# Patient Record
Sex: FEMALE | Race: WHITE | NOT HISPANIC OR LATINO | Employment: OTHER | URBAN - METROPOLITAN AREA
[De-identification: names, ages, dates, MRNs, and addresses within clinical notes are randomized per-mention and may not be internally consistent; named-entity substitution may affect disease eponyms.]

---

## 2017-01-31 RX ORDER — CALCIUM CITRATE/VITAMIN D3 200MG-6.25
TABLET ORAL
Qty: 50 STRIP | Refills: 11 | Status: SHIPPED | OUTPATIENT
Start: 2017-01-31 | End: 2019-01-17

## 2017-01-31 NOTE — TELEPHONE ENCOUNTER
Was the patient seen in the last year in this department? Yes     Does patient have an active prescription for medications requested? No     Received Request Via: Pharmacy    Future Appointments       Provider Department [place]    2/16/2017 10:10 AM JOSS Morrison [facility]

## 2017-02-07 DIAGNOSIS — E78.49 OTHER HYPERLIPIDEMIA: ICD-10-CM

## 2017-02-07 RX ORDER — SIMVASTATIN 40 MG
40 TABLET ORAL EVERY EVENING
Qty: 30 TAB | Refills: 5 | Status: SHIPPED | OUTPATIENT
Start: 2017-02-07 | End: 2017-08-22

## 2017-02-07 NOTE — TELEPHONE ENCOUNTER
Was the patient seen in the last year in this department? Yes     Does patient have an active prescription for medications requested? No     Received Request Via: Pharmacy   Future Appointments       Provider Department [place]    2/16/2017 10:10 AM JOSS Morrison [facility]

## 2017-02-14 ENCOUNTER — HOSPITAL ENCOUNTER (OUTPATIENT)
Dept: LAB | Facility: MEDICAL CENTER | Age: 57
End: 2017-02-14
Attending: NURSE PRACTITIONER
Payer: MEDICAID

## 2017-02-14 DIAGNOSIS — R45.86 MOOD CHANGES: ICD-10-CM

## 2017-02-14 DIAGNOSIS — E11.9 TYPE 2 DIABETES MELLITUS WITHOUT COMPLICATION, WITHOUT LONG-TERM CURRENT USE OF INSULIN (HCC): ICD-10-CM

## 2017-02-14 DIAGNOSIS — E78.49 OTHER HYPERLIPIDEMIA: ICD-10-CM

## 2017-02-14 LAB
ALBUMIN SERPL BCP-MCNC: 4.2 G/DL (ref 3.2–4.9)
ALBUMIN/GLOB SERPL: 1.4 G/DL
ALP SERPL-CCNC: 92 U/L (ref 30–99)
ALT SERPL-CCNC: 17 U/L (ref 2–50)
ANION GAP SERPL CALC-SCNC: 7 MMOL/L (ref 0–11.9)
AST SERPL-CCNC: 17 U/L (ref 12–45)
BILIRUB SERPL-MCNC: 0.4 MG/DL (ref 0.1–1.5)
BUN SERPL-MCNC: 13 MG/DL (ref 8–22)
CALCIUM SERPL-MCNC: 9.2 MG/DL (ref 8.5–10.5)
CHLORIDE SERPL-SCNC: 103 MMOL/L (ref 96–112)
CHOLEST SERPL-MCNC: 194 MG/DL (ref 100–199)
CO2 SERPL-SCNC: 33 MMOL/L (ref 20–33)
CREAT SERPL-MCNC: 0.62 MG/DL (ref 0.5–1.4)
CREAT UR-MCNC: 194.6 MG/DL
EST. AVERAGE GLUCOSE BLD GHB EST-MCNC: 134 MG/DL
GLOBULIN SER CALC-MCNC: 3 G/DL (ref 1.9–3.5)
GLUCOSE SERPL-MCNC: 138 MG/DL (ref 65–99)
HBA1C MFR BLD: 6.3 % (ref 0–5.6)
HDLC SERPL-MCNC: 65 MG/DL
LDLC SERPL CALC-MCNC: 106 MG/DL
MICROALBUMIN UR-MCNC: 1.5 MG/DL
MICROALBUMIN/CREAT UR: 8 MG/G (ref 0–30)
POTASSIUM SERPL-SCNC: 3.7 MMOL/L (ref 3.6–5.5)
PROT SERPL-MCNC: 7.2 G/DL (ref 6–8.2)
SODIUM SERPL-SCNC: 143 MMOL/L (ref 135–145)
TRIGL SERPL-MCNC: 113 MG/DL (ref 0–149)
TSH SERPL DL<=0.005 MIU/L-ACNC: 2.16 UIU/ML (ref 0.3–3.7)

## 2017-02-14 PROCEDURE — 80053 COMPREHEN METABOLIC PANEL: CPT

## 2017-02-14 PROCEDURE — 83036 HEMOGLOBIN GLYCOSYLATED A1C: CPT

## 2017-02-14 PROCEDURE — 84443 ASSAY THYROID STIM HORMONE: CPT

## 2017-02-14 PROCEDURE — 82570 ASSAY OF URINE CREATININE: CPT

## 2017-02-14 PROCEDURE — 82043 UR ALBUMIN QUANTITATIVE: CPT

## 2017-02-14 PROCEDURE — 80061 LIPID PANEL: CPT

## 2017-02-14 PROCEDURE — 36415 COLL VENOUS BLD VENIPUNCTURE: CPT

## 2017-02-16 ENCOUNTER — OFFICE VISIT (OUTPATIENT)
Dept: MEDICAL GROUP | Facility: MEDICAL CENTER | Age: 57
End: 2017-02-16
Attending: NURSE PRACTITIONER
Payer: MEDICAID

## 2017-02-16 VITALS
SYSTOLIC BLOOD PRESSURE: 138 MMHG | BODY MASS INDEX: 48.33 KG/M2 | TEMPERATURE: 97.6 F | HEIGHT: 61 IN | WEIGHT: 256 LBS | DIASTOLIC BLOOD PRESSURE: 90 MMHG | HEART RATE: 80 BPM | OXYGEN SATURATION: 94 % | RESPIRATION RATE: 16 BRPM

## 2017-02-16 DIAGNOSIS — J45.901 ASTHMA EXACERBATION: ICD-10-CM

## 2017-02-16 DIAGNOSIS — E78.49 OTHER HYPERLIPIDEMIA: ICD-10-CM

## 2017-02-16 DIAGNOSIS — I10 ESSENTIAL HYPERTENSION: ICD-10-CM

## 2017-02-16 DIAGNOSIS — J45.30 MILD PERSISTENT ASTHMA, UNCOMPLICATED: ICD-10-CM

## 2017-02-16 DIAGNOSIS — J45.30 MILD PERSISTENT ASTHMA WITHOUT COMPLICATION: ICD-10-CM

## 2017-02-16 DIAGNOSIS — R45.86 MOOD CHANGES: ICD-10-CM

## 2017-02-16 DIAGNOSIS — E66.01 MORBID OBESITY WITH BMI OF 45.0-49.9, ADULT (HCC): ICD-10-CM

## 2017-02-16 DIAGNOSIS — E11.9 TYPE 2 DIABETES MELLITUS WITHOUT COMPLICATION, WITHOUT LONG-TERM CURRENT USE OF INSULIN (HCC): ICD-10-CM

## 2017-02-16 PROCEDURE — 99214 OFFICE O/P EST MOD 30 MIN: CPT | Performed by: NURSE PRACTITIONER

## 2017-02-16 PROCEDURE — 99213 OFFICE O/P EST LOW 20 MIN: CPT | Performed by: NURSE PRACTITIONER

## 2017-02-16 RX ORDER — ALBUTEROL SULFATE 2.5 MG/3ML
2.5 SOLUTION RESPIRATORY (INHALATION) EVERY 4 HOURS PRN
Qty: 75 ML | Refills: 3 | Status: SHIPPED | OUTPATIENT
Start: 2017-02-16 | End: 2017-12-19 | Stop reason: SDUPTHER

## 2017-02-16 RX ORDER — LANCETS
EACH MISCELLANEOUS
COMMUNITY
Start: 2016-12-13

## 2017-02-16 RX ORDER — GLIPIZIDE 5 MG/1
5 TABLET ORAL 2 TIMES DAILY
Qty: 60 TAB | Refills: 2 | Status: SHIPPED | OUTPATIENT
Start: 2017-02-16 | End: 2017-05-22 | Stop reason: SDUPTHER

## 2017-02-16 RX ORDER — VALSARTAN 160 MG/1
160 TABLET ORAL DAILY
Qty: 30 TAB | Refills: 3 | Status: SHIPPED | OUTPATIENT
Start: 2017-02-16 | End: 2017-03-30 | Stop reason: SDUPTHER

## 2017-02-16 RX ORDER — ALBUTEROL SULFATE 90 UG/1
2 AEROSOL, METERED RESPIRATORY (INHALATION) EVERY 6 HOURS PRN
Qty: 8.5 G | Refills: 5 | Status: SHIPPED | OUTPATIENT
Start: 2017-02-16 | End: 2017-10-04 | Stop reason: SDUPTHER

## 2017-02-16 NOTE — ASSESSMENT & PLAN NOTE
"Pt reports her mood is a little better and keeping busy.  Has a car to drive now.  Pt reports stress at home as lives with her daughter and her .  Pt did see Chelsea for intake at [facility] and will have f/u appt for stress reduction.  Rarely using Xanax.   Atarax is helping her sleep \"alot'    I have given Natalie info to contact Dr Jin-psychiatry for a consult.    "

## 2017-02-16 NOTE — PROGRESS NOTES
Chief Complaint: Results and f/u on anxiety/stress.    HPI:  Natalie presents to the clinic for follow up on anxiety.    Her PMH includes    Anxiety/Situtional Stress  Asthma  HTN  Hyperlipidemia  Obesity  Vitamin D Deficiency  DM-2  Right Knee DJD  Splenectomy  Tonsillectomy    Cholecystectomy  Chronic Hoarse Throat    Established with   Orthopedics- Dr Ospina ([facility])    Referrals Approved:  Psychiatry- DR Jin and assoc  [id number]      Review of Records show  16 Clinic visit for anxiety/stress due to family death. Referred to Psychiatry and Psychology(Counseling), RX Xanax and Atarax.  16 Bilat Knee injections by [facility] SAMANO, Dr Cowart  16 Bilat Knee Injections by [facility] SAM[facility]O, Dr Cowart for DJD to knees.  16 Clinic visit for elevated BP, Bleeding Mole concern, Referred to Dermatology ([place] Provider)  16 Last Clinic Visit for chronic hoarse throat, ACE Inhibitor stopped and Pt started on Valsartan, Referral to Allergist and ENT    [place]  Report  16 Xanax 0.25 # 20 prescribed by me  -----------------------------------------------------------    REsults Review:  17 Labs-  CMP normal except BS= 138, A1C= 6.3 (~ 134 avg) compared to previous A1C= 7.0 on 16                          Cholesterol Total= 114, Triglycerides = 113, HDL= 65, LDL= 106 (improved since April)                          Microalbumin/Cr urine Ratio= 8,  TSH= 2.160 compared to previous 3.030 on 16    Essential hypertension  BP  138/90  in clinic today. Known hx of HTN. Pt stating that they have been taking their medication except today, as prescribed without adverse effect. Pt denies HA, vision changes, neurologic deficits, CP, SOB.     Her BP has been elevated the past few visits. We discussed increasing her Valsartan dose and to continue her Amlodipine at current dose and Pt agrees.  Pt to check BP a few times per week. If too high or low to  "call/return.    Hyperlipidemia  We reviewed her  Much improved Lipid Panel.  Pt reports she is taking her Zocor 40 mg/day.  Denies Myalgias      Type 2 diabetes mellitus without complication  WE reviewed her improved A1C and other labs.  Pt reports she is taking Metformin only once in a while as it causes diarrhea. Denies lows.    Morbid obesity with BMI of 45.0-49.9, adult ([facility])  Pt is over weight and has been working on reducing her intake of sugar.   She has lost weight recently. 8/2016  Wt = 267, Today 256 lbs.  Pt is having difficulty exercising due to knee pains, but has an injections planned into her knees by   Dr Ospina's.     Mood changes (CMS-HCC)/Anxiety/Insomnia  Pt reports her mood is a little better and keeping busy.  Has a car to drive now.  Pt reports stress at home as lives with her daughter and her .  Pt did see Chelsea for intake at [facility] and will have f/u appt for stress reduction.  Rarely using Xanax.   Atarax is helping her sleep \"alot'    I have given Natalie info to contact Dr Jin-psychiatry for a consult.      Mild persistent asthma  Pt reports asthma is \"okay\" needs refills of inhaler.  Former smoker. Denies SOB or feeling ill.   Will refill her 2 inhalers (atrovent and Albuterol).  Pt has nebulizer at home but rarely needs it.      Patient Active Problem List    Diagnosis Date Noted   • Morbid obesity with BMI of 45.0-49.9, adult ([facility]) 02/16/2017   • Mood changes (CMS-HCC) 12/13/2016   • Atypical mole 08/01/2016   • Hoarseness, persistent 07/07/2016   • Insomnia 07/07/2016   • Hyperlipidemia 06/09/2016   • Osteoarthritis of knee 04/14/2016   • Vision problems 03/24/2016   • Right knee pain 03/24/2016   • Immunization deficiency 03/24/2016   • Vitamin D deficiency 11/12/2015   • Hyperglycemia 11/12/2015   • Type 2 diabetes mellitus without complication (CMS-HCC) 11/12/2015   • Essential hypertension 10/22/2015   • Mild persistent asthma 10/22/2015   • Episodic headache " 10/22/2015   • H/O splenectomy 10/22/2015       Allergies:Voltaren    Current Outpatient Prescriptions   Medication Sig Dispense Refill   • glipiZIDE (GLUCOTROL) 5 MG Tab Take 1 Tab by mouth 2 times a day. 60 Tab 2   • ipratropium (ATROVENT) 17 MCG/ACT Aero Soln Inhale 2 Puffs by mouth every 6 hours. 17 g 5   • albuterol 108 (90 BASE) MCG/ACT Aero Soln inhalation aerosol Inhale 2 Puffs by mouth every 6 hours as needed for Shortness of Breath. 8.5 g 5   • albuterol (PROVENTIL) 2.5mg/3ml Nebu Soln solution for nebulization 3 mL by Nebulization route every four hours as needed for Shortness of Breath. 75 mL 3   • valsartan (DIOVAN) 160 MG Tab Take 1 Tab by mouth every day. 30 Tab 3   • TECHLITE LANCETS Misc      • simvastatin (ZOCOR) 40 MG Tab Take 1 Tab by mouth every evening. 30 Tab 5   • TRUE METRIX BLOOD GLUCOSE TEST strip FINGER STICK BLOOD SUGAR ONCE DAILY FASTING AND IF SYMPTOMATIC 50 Strip 11   • LUMIGAN 0.01 % Solution      • amlodipine (NORVASC) 10 MG Tab Take 1 Tab by mouth every day. 30 Tab 5   • hydrOXYzine (ATARAX) 25 MG Tab Take 1 Tab by mouth at bedtime as needed for Anxiety (Insomnia). 30 Tab 3   • montelukast (SINGULAIR) 10 MG Tab Take 1 Tab by mouth every day. 30 Tab 5   • Misc. Devices Misc DM testing supplies: glucometer(1), test strips(50), lancets(50)Dispense brand covered by patients insuranceTesting frequency: Daily- fasting and if [id number] 1 Each 0   • fluticasone (FLONASE ALLERGY RELIEF) 50 MCG/ACT nasal spray Spray 1 Spray in nose 2 times a day. 1 Bottle 5   • alprazolam (XANAX) 0.25 MG Tab Take 1 Tab by mouth 1 time daily as needed for Anxiety. 20 Tab 0   • vitamin D, Ergocalciferol, (DRISDOL) 13463 UNITS Cap capsule TAKE 1 CAPSULE ORALLY ONCE EVERY SEVEN (7) DAYS 4 Cap 3   • carbamazepine (TEGRETOL) 200 MG Tab TAKE 1 TABLET ORALLY EVERY MORNING AND 2 TABLETS BY MOUTH EVERY EVENING 90 Tab 5   • fluticasone (FLONASE) 50 MCG/ACT nasal spray Spray 1 Spray in nose every day. 16 g 11  "  • meclizine (ANTIVERT) 25 MG Tab Take 1 Tab by mouth 2 times a day as needed. 30 Tab 1     No current facility-administered medications for this visit.       Social History   Substance Use Topics   • Smoking status: Former Smoker -- 5 years   • Smokeless tobacco: Never Used   • Alcohol Use: No       Family History   Problem Relation Age of Onset   • Diabetes Mother    • Heart Disease Mother        ROS:  Review of Systems   See HPI Above        Exam:  Blood pressure 138/90, pulse 80, temperature 36.4 °C (97.6 °F), resp. rate 16, height 1.549 m (5' 0.98\"), weight 116.121 kg (256 lb), SpO2 94 %.  General:  Well nourished, over-weight, well developed female in NAD  HENT:Head is grossly normal. PERRL.  Neck: Supple. Trachea is midline.  Pulmonary: Clear to ausculation .  Normal effort. No rales, ronchi, or wheezing.   Cardiovascular: Regular rate and rhythm.  Abdomen-Abdomen is soft, No tenderness.  Upper extremities- Strong = . Good ROM  Lower extremities- neg for edema, redness, tenderness.  Neuro- A & O x 4. Speech clear and appropriate.     Current medications, allergies, and problem list reviewed with patient and updated in  [facility] today.    Assessment/Plan:  1. Essential hypertension  valsartan (DIOVAN) 160 MG Tab- Increase in dose. Continue Amlodipine 10 mg/day   2. Other hyperlipidemia  Continue Simvastatin 40 mg/day.   3. Type 2 diabetes mellitus without complication, without long-term current use of insulin (CMS-HCC)  glipiZIDE (GLUCOTROL) 5 MG Tab BID.  Stop Metformin due to diarrhea and Pt not tolerating or taking. Pt to continue reduction of intake of sugar/carbs   4. Morbid obesity with BMI of 45.0-49.9, adult (CMS-HCC)  Patient identified as having weight management issue.  Appropriate orders and counseling given.   5. Mood changes (CMS-HCC)  Xanax on rare use only. Atarax at hs prn  Continue w [facility] counseling. Pt to call Dr Jin for Psychiatry appt.   6. Asthma exacerbation  ipratropium " (ATROVENT) 17 MCG/ACT Aero Soln-refill   7. Mild persistent asthma, uncomplicated  albuterol 108 (90 BASE) MCG/ACT Aero Soln inhalation aerosol-refill    albuterol (PROVENTIL) 2.5mg/3ml Nebu Soln solution for nebulization-refill        Follow up in 6 weeks.. Call or return if questions, concerns, or worsening condition.

## 2017-02-16 NOTE — ASSESSMENT & PLAN NOTE
Pt is over weight and has been working on reducing her intake of sugar.   She has lost weight recently. 8/2016  Wt = 267, Today 256 lbs.  Pt is having difficulty exercising due to knee pains, but has an injections planned into her knees by   Dr Ospina's.

## 2017-02-16 NOTE — MR AVS SNAPSHOT
"        Natalie Eugene   2017 11:10 AM   Office Visit   MRN: [id number]    Department:  [facility]   Dept Phone:  [phone]    Description:  Female : 1960   Provider:  JOSS Morrison           Reason for Visit     Follow-Up           Allergies as of 2017     Allergen Noted Reactions    Voltaren [Diclofenac-Disod Edta] 2016   Itching      You were diagnosed with     Essential hypertension   [1230976]       Other hyperlipidemia   [0875543]       Type 2 diabetes mellitus without complication, without long-term current use of insulin (CMS-HCC)   [9469183]       Morbid obesity with BMI of 45.0-49.9, adult (CMS-HCC)   [483420]       Mood changes (CMS-HCC)   [211871]       Asthma exacerbation   [901733]       Mild persistent asthma, uncomplicated   [890862]       Mild persistent asthma without complication   [212309]         Vital Signs     Blood Pressure Pulse Temperature Respirations Height Weight    138/90 mmHg 80 36.4 °C (97.6 °F) 16 1.549 m (5' 0.98\") 116.121 kg (256 lb)    Body Mass Index Oxygen Saturation Smoking Status             48.40 kg/m2 94% Former Smoker         Basic Information     Date Of Birth Sex Race Ethnicity Preferred Language    1960 Female White Non- English      Your appointments     Mar 30, 2017 10:30 AM   Established Patient with JOSS Morrison   [facility]    [address] [place] [id number]   [phone]           You will be receiving a confirmation call a few days before your appointment from our automated call confirmation system.              Problem List              ICD-10-CM Priority Class Noted - Resolved    Essential hypertension I10   10/22/2015 - Present    Mild persistent asthma J45.30   10/22/2015 - Present    Episodic headache R51   10/22/2015 - Present    H/O splenectomy Z90.81   10/22/2015 - Present    Vitamin D deficiency E55.9   2015 - Present    Hyperglycemia R73.9   2015 " - Present    Type 2 diabetes mellitus without complication (CMS-HCC) E11.9   11/12/2015 - Present    Vision problems H54.7   3/24/2016 - Present    Right knee pain M25.561   3/24/2016 - Present    Immunization deficiency Z28.3   3/24/2016 - Present    Osteoarthritis of knee M17.9   4/14/2016 - Present    Hyperlipidemia E78.5   6/9/2016 - Present    Hoarseness, persistent R49.0   7/7/2016 - Present    Insomnia G47.00   7/7/2016 - Present    Atypical mole D22.9   8/1/2016 - Present    Mood changes (CMS-HCC) F39   12/13/2016 - Present    Morbid obesity with BMI of 45.0-49.9, adult ([facility]) E66.01, Z68.42   2/16/2017 - Present      Health Maintenance        Date Due Completion Dates    IMM HEP B VACCINE (1 of 3 - Primary Series) 1960 ---    DIABETES MONOFILAMENT / LE EXAM 1960 ---    RETINAL SCREENING 2/23/1978 ---    PAP SMEAR 2/23/1981 ---    MAMMOGRAM 2/23/2000 ---    COLONOSCOPY 2/23/2010 ---    IMM INFLUENZA (1) 9/1/2016 ---    A1C SCREENING 8/14/2017 2/14/2017, 4/8/2016, 3/18/2016    FASTING LIPID PROFILE 2/14/2018 2/14/2017, 4/8/2016, 10/24/2015    URINE ACR / MICROALBUMIN 2/14/2018 2/14/2017    SERUM CREATININE 2/14/2018 2/14/2017, 4/8/2016, 3/18/2016, 10/24/2015    IMM PNEUMOCOCCAL 19-64 (ADULT) HIGHEST RISK SERIES (3 of 3 - PPSV23) 2/1/2020 4/14/2016, 2/1/2015    IMM DTaP/Tdap/Td Vaccine (2 - Td) 2/1/2025 2/1/2015            Current Immunizations     13-VALENT PCV PREVNAR 4/14/2016 10:38 AM    Pneumococcal polysaccharide vaccine (PPSV-23) 2/1/2015    Tdap Vaccine 2/1/2015      Below and/or attached are the medications your provider expects you to take. Review all of your home medications and newly ordered medications with your provider and/or pharmacist. Follow medication instructions as directed by your provider and/or pharmacist. Please keep your medication list with you and share with your provider. Update the information when medications are discontinued, doses are changed, or new medications  (including over-the-counter products) are added; and carry medication information at all times in the event of emergency situations     Allergies:  VOLTAREN - Itching               Medications  Valid as of: February 16, 2017 - 11:35 AM    Generic Name Brand Name Tablet Size Instructions for use    Albuterol Sulfate (Aero Soln) albuterol 108 (90 BASE) MCG/ACT Inhale 2 Puffs by mouth every 6 hours as needed for Shortness of Breath.        Albuterol Sulfate (Nebu Soln) PROVENTIL 2.5mg/3ml 3 mL by Nebulization route every four hours as needed for Shortness of Breath.        ALPRAZolam (Tab) XANAX 0.25 MG Take 1 Tab by mouth 1 time daily as needed for Anxiety.        AmLODIPine Besylate (Tab) NORVASC 10 MG Take 1 Tab by mouth every day.        Bimatoprost (Solution) LUMIGAN 0.01 %         CarBAMazepine (Tab) TEGRETOL 200 MG TAKE 1 TABLET ORALLY EVERY MORNING AND 2 TABLETS BY MOUTH EVERY EVENING        Ergocalciferol (Cap) DRISDOL 50972 UNITS TAKE 1 CAPSULE ORALLY ONCE EVERY SEVEN (7) DAYS        Fluticasone Propionate (Suspension) FLONASE 50 MCG/ACT Spray 1 Spray in nose every day.        Fluticasone Propionate (Suspension) FLONASE 50 MCG/ACT Spray 1 Spray in nose 2 times a day.        GlipiZIDE (Tab) GLUCOTROL 5 MG Take 1 Tab by mouth 2 times a day.        Glucose Blood (Strip) TRUE METRIX BLOOD GLUCOSE TEST  FINGER STICK BLOOD SUGAR ONCE DAILY FASTING AND IF SYMPTOMATIC        HydrOXYzine HCl (Tab) ATARAX 25 MG Take 1 Tab by mouth at bedtime as needed for Anxiety (Insomnia).        Ipratropium Bromide HFA (Aero Soln) ATROVENT 17 MCG/ACT Inhale 2 Puffs by mouth every 6 hours.        Meclizine HCl (Tab) ANTIVERT 25 MG Take 1 Tab by mouth 2 times a day as needed.        Misc. Devices (Misc) Misc. Devices  DM testing supplies: glucometer(1), test strips(50), lancets(50)Dispense brand covered by patients insuranceTesting frequency: Daily- fasting and if [id number]        Montelukast Sodium (Tab) SINGULAIR 10 MG Take  1 Tab by mouth every day.        Simvastatin (Tab) ZOCOR 40 MG Take 1 Tab by mouth every evening.        Valsartan (Tab) DIOVAN 160 MG Take 1 Tab by mouth every day.        .                 Medicines prescribed today were sent to:     [facility] [place] - [address]    [address] [facility] [place] [address]    Phone: [phone] Fax: [phone]    Open 24 Hours?: No      Medication refill instructions:       If your prescription bottle indicates you have medication refills left, it is not necessary to call your provider’s office. Please contact your pharmacy and they will refill your medication.    If your prescription bottle indicates you do not have any refills left, you may request refills at any time through one of the following ways: The online [employer] system (except Urgent Care), by calling your provider’s office, or by asking your pharmacy to contact your provider’s office with a refill request. Medication refills are processed only during regular business hours and may not be available until the next business day. Your provider may request additional information or to have a follow-up visit with you prior to refilling your medication.   *Please Note: Medication refills are assigned a new Rx number when refilled electronically. Your pharmacy may indicate that no refills were authorized even though a new prescription for the same medication is available at the pharmacy. Please request the medicine by name with the pharmacy before contacting your provider for a refill.        Other Notes About Your Plan     12/6/16 DR Cowart ([facility]) Bilat knee injections for severe DJD to both knees.  8/17/16 Dr ospina- Joint injection. F/u 3 months  5/19/16 Orthopedic [facility] appt- DR Ospina: Bilat Knee Injections. F/u 3-4 months.           [facility] Access Code: Activation code not generated  Current [employer] Status: Active

## 2017-02-16 NOTE — ASSESSMENT & PLAN NOTE
BP  138/90  in clinic today. Known hx of HTN. Pt stating that they have been taking their medication except today, as prescribed without adverse effect. Pt denies HA, vision changes, neurologic deficits, CP, SOB.     Her BP has been elevated the past few visits. We discussed increasing her Valsartan dose and to continue her Amlodipine at current dose and Pt agrees.  Pt to check BP a few times per week. If too high or low to call/return.

## 2017-02-16 NOTE — ASSESSMENT & PLAN NOTE
"Pt reports asthma is \"okay\" needs refills of inhaler.  Former smoker. Denies SOB or feeling ill.   Will refill her 2 inhalers (atrovent and Albuterol).  Pt has nebulizer at home but rarely needs it.  "

## 2017-02-16 NOTE — ASSESSMENT & PLAN NOTE
WE reviewed her improved A1C and other labs.  Pt reports she is taking Metformin only once in a while as it causes diarrhea. Denies lows.

## 2017-03-30 ENCOUNTER — OFFICE VISIT (OUTPATIENT)
Dept: MEDICAL GROUP | Facility: MEDICAL CENTER | Age: 57
End: 2017-03-30
Attending: NURSE PRACTITIONER
Payer: MEDICAID

## 2017-03-30 VITALS
RESPIRATION RATE: 16 BRPM | OXYGEN SATURATION: 90 % | HEIGHT: 61 IN | WEIGHT: 251 LBS | BODY MASS INDEX: 47.39 KG/M2 | HEART RATE: 84 BPM | SYSTOLIC BLOOD PRESSURE: 126 MMHG | TEMPERATURE: 97.3 F | DIASTOLIC BLOOD PRESSURE: 78 MMHG

## 2017-03-30 DIAGNOSIS — R45.86 MOOD CHANGES: ICD-10-CM

## 2017-03-30 DIAGNOSIS — Z91.09 ENVIRONMENTAL ALLERGIES: ICD-10-CM

## 2017-03-30 DIAGNOSIS — E11.9 TYPE 2 DIABETES MELLITUS WITHOUT COMPLICATION, WITHOUT LONG-TERM CURRENT USE OF INSULIN (HCC): ICD-10-CM

## 2017-03-30 DIAGNOSIS — J45.40 MODERATE PERSISTENT ASTHMA WITHOUT COMPLICATION: ICD-10-CM

## 2017-03-30 DIAGNOSIS — E66.01 MORBID OBESITY WITH BMI OF 45.0-49.9, ADULT (HCC): ICD-10-CM

## 2017-03-30 DIAGNOSIS — I10 ESSENTIAL HYPERTENSION: ICD-10-CM

## 2017-03-30 PROCEDURE — 99214 OFFICE O/P EST MOD 30 MIN: CPT | Performed by: NURSE PRACTITIONER

## 2017-03-30 RX ORDER — BLOOD-GLUCOSE METER
1 EACH MISCELLANEOUS DAILY
Qty: 1 DEVICE | Refills: 0 | Status: SHIPPED | OUTPATIENT
Start: 2017-03-30

## 2017-03-30 RX ORDER — VALSARTAN 160 MG/1
160 TABLET ORAL DAILY
Qty: 30 TAB | Refills: 5 | Status: SHIPPED | OUTPATIENT
Start: 2017-03-30 | End: 2017-10-04 | Stop reason: SDUPTHER

## 2017-03-30 NOTE — ASSESSMENT & PLAN NOTE
"Pt has of asthma.  Pt reports all year long \"get stuffy\" and when certain trees bloom is worse.  Typically takes Claritin and sometimes Singulair.  Also uses nasal flonase.  Asking for testing.  "

## 2017-03-30 NOTE — ASSESSMENT & PLAN NOTE
BP   126/78    in clinic today, which is an improvement from last month where it was 138/90. Known hx of HTN. Pt reporting  Pt stating that they have been taking their medication  (Valsartan, Amlodipine) as prescribed without adverse effect. Pt denies HA, vision changes, neurologic deficits, CP, SOB.

## 2017-03-30 NOTE — MR AVS SNAPSHOT
"        Natalie Eugene   3/30/2017 3:10 PM   Office Visit   MRN: [id number]    [facility]   Dept Phone:  [phone]    Description:  Female : 1960   Provider:  JOSS Morrison           Reason for Visit     Hypertension           Allergies as of 3/30/2017     Allergen Noted Reactions    Voltaren [Diclofenac-Disod Edta] 2016   Itching      You were diagnosed with     Essential hypertension   [8389346]       Type 2 diabetes mellitus without complication, without long-term current use of insulin (CMS-HCC)   [3289762]       Environmental allergies   [725976]       Moderate persistent asthma without complication   [700510]         Vital Signs     Blood Pressure Pulse Temperature Respirations Height Weight    126/78 mmHg 84 36.3 °C (97.3 °F) 16 1.549 m (5' 0.98\") 113.853 kg (251 lb)    Body Mass Index Oxygen Saturation Smoking Status             47.45 kg/m2 90% Former Smoker         Basic Information     Date Of Birth Sex Race Ethnicity Preferred Language    1960 Female White Non- English      Problem List              ICD-10-CM Priority Class Noted - Resolved    Essential hypertension I10   10/22/2015 - Present    Moderate persistent asthma J45.40   10/22/2015 - Present    Episodic headache R51   10/22/2015 - Present    H/O splenectomy Z90.81   10/22/2015 - Present    Vitamin D deficiency E55.9   2015 - Present    Hyperglycemia R73.9   2015 - Present    Type 2 diabetes mellitus without complication (CMS-HCC) E11.9   2015 - Present    Vision problems H54.7   3/24/2016 - Present    Right knee pain M25.561   3/24/2016 - Present    Immunization deficiency Z28.3   3/24/2016 - Present    Osteoarthritis of knee M17.9   2016 - Present    Hyperlipidemia E78.5   2016 - Present    Hoarseness, persistent R49.0   2016 - Present    Insomnia G47.00   2016 - Present    Atypical mole D22.9   2016 - Present    Mood changes (CMS-HCC) F39   " 12/13/2016 - Present    Morbid obesity with BMI of 45.0-49.9, adult ([facility]) E66.01, Z68.42   2/16/2017 - Present    Environmental allergies Z91.09   3/30/2017 - Present      Health Maintenance        Date Due Completion Dates    IMM HEP B VACCINE (1 of 3 - Primary Series) 1960 ---    DIABETES MONOFILAMENT / LE EXAM 1960 ---    RETINAL SCREENING 2/23/1978 ---    PAP SMEAR 2/23/1981 ---    MAMMOGRAM 2/23/2000 ---    COLONOSCOPY 2/23/2010 ---    IMM INFLUENZA (1) 9/1/2016 ---    A1C SCREENING 8/14/2017 2/14/2017, 4/8/2016, 3/18/2016    FASTING LIPID PROFILE 2/14/2018 2/14/2017, 4/8/2016, 10/24/2015    URINE ACR / MICROALBUMIN 2/14/2018 2/14/2017    SERUM CREATININE 2/14/2018 2/14/2017, 4/8/2016, 3/18/2016, 10/24/2015    IMM PNEUMOCOCCAL 19-64 (ADULT) HIGHEST RISK SERIES (3 of 3 - PPSV23) 2/1/2020 4/14/2016, 2/1/2015    IMM DTaP/Tdap/Td Vaccine (2 - Td) 2/1/2025 2/1/2015            Current Immunizations     13-VALENT PCV PREVNAR 4/14/2016 10:38 AM    Pneumococcal polysaccharide vaccine (PPSV-23) 2/1/2015    Tdap Vaccine 2/1/2015      Below and/or attached are the medications your provider expects you to take. Review all of your home medications and newly ordered medications with your provider and/or pharmacist. Follow medication instructions as directed by your provider and/or pharmacist. Please keep your medication list with you and share with your provider. Update the information when medications are discontinued, doses are changed, or new medications (including over-the-counter products) are added; and carry medication information at all times in the event of emergency situations     Allergies:  VOLTAREN - Itching               Medications  Valid as of: March 30, 2017 -  3:19 PM    Generic Name Brand Name Tablet Size Instructions for use    Albuterol Sulfate (Aero Soln) albuterol 108 (90 BASE) MCG/ACT Inhale 2 Puffs by mouth every 6 hours as needed for Shortness of Breath.        Albuterol Sulfate (Nebu Soln)  PROVENTIL 2.5mg/3ml 3 mL by Nebulization route every four hours as needed for Shortness of Breath.        ALPRAZolam (Tab) XANAX 0.25 MG Take 1 Tab by mouth 1 time daily as needed for Anxiety.        AmLODIPine Besylate (Tab) NORVASC 10 MG Take 1 Tab by mouth every day.        Bimatoprost (Solution) LUMIGAN 0.01 %         Blood Glucose Monitoring Suppl (Device) TRUE METRIX AIR GLUCOSE METER  1 Each by Does not apply route every day.        CarBAMazepine (Tab) TEGRETOL 200 MG TAKE 1 TABLET ORALLY EVERY MORNING AND 2 TABLETS BY MOUTH EVERY EVENING        Ergocalciferol (Cap) DRISDOL 36868 UNITS TAKE 1 CAPSULE ORALLY ONCE EVERY SEVEN (7) DAYS        Fluticasone Propionate (Suspension) FLONASE 50 MCG/ACT Spray 1 Spray in nose every day.        Fluticasone Propionate (Suspension) FLONASE 50 MCG/ACT Spray 1 Spray in nose 2 times a day.        GlipiZIDE (Tab) GLUCOTROL 5 MG Take 1 Tab by mouth 2 times a day.        Glucose Blood (Strip) TRUE METRIX BLOOD GLUCOSE TEST  FINGER STICK BLOOD SUGAR ONCE DAILY FASTING AND IF SYMPTOMATIC        Glucose Blood (Strip) glucose blood  1 Strip by Other route as needed.        HydrOXYzine HCl (Tab) ATARAX 25 MG Take 1 Tab by mouth at bedtime as needed for Anxiety (Insomnia).        Ipratropium Bromide HFA (Aero Soln) ATROVENT 17 MCG/ACT Inhale 2 Puffs by mouth every 6 hours.        Lancets (Misc) TECHLITE LANCETS          Meclizine HCl (Tab) ANTIVERT 25 MG Take 1 Tab by mouth 2 times a day as needed.        Misc. Devices (Misc) Misc. Devices  DM testing supplies: glucometer(1), test strips(50), lancets(50)Dispense brand covered by patients insuranceTesting frequency: Daily- fasting and if [id number]        Misc. Devices (Misc) Misc. Devices  Nebulizer for home use with Albuterol Moderate Asthma and environmental allergies causing bronchospasm        Montelukast Sodium (Tab) SINGULAIR 10 MG Take 1 Tab by mouth every day.        Simvastatin (Tab) ZOCOR 40 MG Take 1 Tab by mouth  every evening.        Valsartan (Tab) DIOVAN 160 MG Take 1 Tab by mouth every day.        .                 Medicines prescribed today were sent to:     [facility] - [place], [place] - [address] [facility].    [address] [place] [address] [place] [address]    Phone: [phone] Fax: [phone]    Open 24 Hours?: No      Medication refill instructions:       If your prescription bottle indicates you have medication refills left, it is not necessary to call your provider’s office. Please contact your pharmacy and they will refill your medication.    If your prescription bottle indicates you do not have any refills left, you may request refills at any time through one of the following ways: The online [employer] system (except Urgent Care), by calling your provider’s office, or by asking your pharmacy to contact your provider’s office with a refill request. Medication refills are processed only during regular business hours and may not be available until the next business day. Your provider may request additional information or to have a follow-up visit with you prior to refilling your medication.   *Please Note: Medication refills are assigned a new Rx number when refilled electronically. Your pharmacy may indicate that no refills were authorized even though a new prescription for the same medication is available at the pharmacy. Please request the medicine by name with the pharmacy before contacting your provider for a refill.        Your To Do List     Future Labs/Procedures Complete By Expires    COMP METABOLIC PANEL (For Serum Creatinine Topic, choose 1 only)  As directed 3/30/2018    HEMOGLOBIN A1C (For A1C Every 6 Months Topic)  As directed 3/30/2018    Comments:    HEMOGLOBIN A1C   [unfilled]      Other Notes About Your Plan     3/1/17 DR Cowart- Bilat Knee Injections tx.  12/6/16 DR Cowart ([facility]) Bilat knee injections for severe DJD to both knees.  8/17/16 Dr roth- Joint injection. F/u 3 months  5/19/16 Orthopedic  JACKY appt- DR Ospina: Bilat Knee Injections. F/u 3-4 months.           MyChart Access Code: Activation code not generated  Current [employer] Status: Active

## 2017-03-30 NOTE — PROGRESS NOTES
Chief Complaint: Recheck of BP, Environmental Allergies, need for Nebulizer and new Glucometer.    HPI:  Natalie presents for f/u on HTN.    Her PMH includes    Anxiety/Situtional Stress  Asthma  HTN  Hyperlipidemia  Obesity  Vitamin D Deficiency  DM-2  Right Knee DJD  Splenectomy  Tonsillectomy    Cholecystectomy  Chronic Hoarse Throat    Established with    Orthopedics- Dr Ospina ([facility])    Referrals Approved:  Psychiatry- DR Jin and assoc  [id number]      Review of Records show  3/1/17 Orthopedic Appt- DR Cowart- Bilat knee injections completed.  17 Clinic visit for adjustment of BP medication---> Increase Valsartan dose/HTN, Adding Glipizide PO as not tolerating Metformin.  16 Clinic visit for anxiety/stress due to family death. Referred to Psychiatry and Psychology(Counseling), RX Xanax and Atarax.  16 Bilat Knee injections by Dr Sofya RICO MD  16 Bilat Knee Injections by JACKY SAMANO, Dr Cowart for DJD to knees.  16 Clinic visit for elevated BP, Bleeding Mole concern, Referred to Dermatology ([place] Provider)  16 Last Clinic Visit for chronic hoarse throat, ACE Inhibitor stopped and Pt started on Valsartan, Referral to Allergist and ENT    [place]  Report  16 Xanax 0.25 # 20 prescribed by me  -----------------------------------------------------------      Essential hypertension  BP   126/78    in clinic today, which is an improvement from last month where it was 138/90. Known hx of HTN. Pt reporting  Pt stating that they have been taking their medication  (Valsartan, Amlodipine) as prescribed without adverse effect. Pt denies HA, vision changes, neurologic deficits, CP, SOB.     Type 2 diabetes mellitus without complication  Pt has known DM-2. She has not been able to tolerate Metformin due to side effects of Diarrhea. At last visit, 17 we started her on Glipizide. She reports  Has not been able to check FSBS due to Meter  "missing.  She has been taking Glipizide BID.    Environmental allergies  Pt has of asthma.  Pt reports all year long \"get stuffy\" and when certain trees bloom is worse.  Typically takes Claritin and sometimes Singulair.  Also uses nasal flonase.  Asking for testing.    Moderate persistent asthma  Pt reports her Nebulizer \"dissappeared\" and not able to give  Herself treatments. Has Albuterol and needs nebulizer.  Using regular Inhalers.    Morbid obesity with BMI of 45.0-49.9, adult ([facility])  Pt has lost 5 more pounds since last month.  States is working on cutting back on her carb intake.  Feels like her DM-2 is better controlled.    Mood changes (CMS-HCC)  Pt reports she has been seeing [facility] for counseling and reports she is \"co-dependent\".  She is currently not attending any meetings, and I have recommended a [facility] or [facility] for these meetings.  Denies current anxiety or depression.         Patient Active Problem List    Diagnosis Date Noted   • Environmental allergies 03/30/2017   • Morbid obesity with BMI of 45.0-49.9, adult ([facility]) 02/16/2017   • Mood changes (CMS-HCC) 12/13/2016   • Atypical mole 08/01/2016   • Hoarseness, persistent 07/07/2016   • Insomnia 07/07/2016   • Hyperlipidemia 06/09/2016   • Osteoarthritis of knee 04/14/2016   • Vision problems 03/24/2016   • Right knee pain 03/24/2016   • Immunization deficiency 03/24/2016   • Vitamin D deficiency 11/12/2015   • Hyperglycemia 11/12/2015   • Type 2 diabetes mellitus without complication (CMS-HCC) 11/12/2015   • Essential hypertension 10/22/2015   • Moderate persistent asthma 10/22/2015   • Episodic headache 10/22/2015   • H/O splenectomy 10/22/2015       Allergies:Voltaren    Current Outpatient Prescriptions   Medication Sig Dispense Refill   • glucose blood (TRUE METRIX BLOOD GLUCOSE TEST) strip 1 Strip by Other route as needed. 50 Strip 2   • Blood Glucose Monitoring Suppl (TRUE METRIX AIR GLUCOSE METER) Device 1 " Each by Does not apply route every day. 1 Device 0   • valsartan (DIOVAN) 160 MG Tab Take 1 Tab by mouth every day. 30 Tab 5   • Misc. Devices Misc Nebulizer for home use with Albuterol Moderate Asthma and environmental allergies causing bronchospasm 1 Each 0   • glipiZIDE (GLUCOTROL) 5 MG Tab Take 1 Tab by mouth 2 times a day. 60 Tab 2   • ipratropium (ATROVENT) 17 MCG/ACT Aero Soln Inhale 2 Puffs by mouth every 6 hours. 17 g 5   • albuterol 108 (90 BASE) MCG/ACT Aero Soln inhalation aerosol Inhale 2 Puffs by mouth every 6 hours as needed for Shortness of Breath. 8.5 g 5   • albuterol (PROVENTIL) 2.5mg/3ml Nebu Soln solution for nebulization 3 mL by Nebulization route every four hours as needed for Shortness of Breath. 75 mL 3   • TECHLITE LANCETS Misc      • simvastatin (ZOCOR) 40 MG Tab Take 1 Tab by mouth every evening. 30 Tab 5   • TRUE METRIX BLOOD GLUCOSE TEST strip FINGER STICK BLOOD SUGAR ONCE DAILY FASTING AND IF SYMPTOMATIC 50 Strip 11   • LUMIGAN 0.01 % Solution      • amlodipine (NORVASC) 10 MG Tab Take 1 Tab by mouth every day. 30 Tab 5   • hydrOXYzine (ATARAX) 25 MG Tab Take 1 Tab by mouth at bedtime as needed for Anxiety (Insomnia). 30 Tab 3   • montelukast (SINGULAIR) 10 MG Tab Take 1 Tab by mouth every day. 30 Tab 5   • Misc. Devices Misc DM testing supplies: glucometer(1), test strips(50), lancets(50)Dispense brand covered by patients insuranceTesting frequency: Daily- fasting and if [id number] 1 Each 0   • fluticasone (FLONASE ALLERGY RELIEF) 50 MCG/ACT nasal spray Spray 1 Spray in nose 2 times a day. 1 Bottle 5   • alprazolam (XANAX) 0.25 MG Tab Take 1 Tab by mouth 1 time daily as needed for Anxiety. 20 Tab 0   • vitamin D, Ergocalciferol, (DRISDOL) 41253 UNITS Cap capsule TAKE 1 CAPSULE ORALLY ONCE EVERY SEVEN (7) DAYS 4 Cap 3   • carbamazepine (TEGRETOL) 200 MG Tab TAKE 1 TABLET ORALLY EVERY MORNING AND 2 TABLETS BY MOUTH EVERY EVENING 90 Tab 5   • fluticasone (FLONASE) 50 MCG/ACT nasal  "spray Spray 1 Spray in nose every day. 16 g 11   • meclizine (ANTIVERT) 25 MG Tab Take 1 Tab by mouth 2 times a day as needed. 30 Tab 1     No current facility-administered medications for this visit.       Social History   Substance Use Topics   • Smoking status: Former Smoker -- 5 years   • Smokeless tobacco: Never Used   • Alcohol Use: No       Family History   Problem Relation Age of Onset   • Diabetes Mother    • Heart Disease Mother        ROS:  Review of Systems   See HPI Above    Exam:  Blood pressure 126/78, pulse 84, temperature 36.3 °C (97.3 °F), resp. rate 16, height 1.549 m (5' 0.98\"), weight 113.853 kg (251 lb), SpO2 90 %.  General:  Well nourished, over-weight, well developed female in NAD  HENT:Head is grossly normal. PERRL.  Neck: Supple. Trachea is midline.  Pulmonary: Clear to ausculation .  Normal effort. No rales, ronchi, or wheezing.   Cardiovascular: Regular rate and rhythm.  Abdomen-Abdomen is soft, No tenderness.  Upper extremities-  Good ROM  Lower extremities- neg for edema, redness, tenderness.  Neuro- A & O x 4. Speech clear and appropriate.     Current medications, allergies, and problem list reviewed with patient and updated in  [facility] today.    Assessment/Plan:  1. Essential hypertension  valsartan (DIOVAN) 160 MG Tab   2. Type 2 diabetes mellitus without complication, without long-term current use of insulin (CMS-HCC)  glucose blood (TRUE METRIX BLOOD GLUCOSE TEST) strip    Blood Glucose Monitoring Suppl (TRUE METRIX AIR GLUCOSE METER) Device    HEMOGLOBIN A1C (For A1C Every 6 Months Topic) in ~ 2 months    COMP METABOLIC PANEL in ~ 2 months   3. Environmental allergies  ALLERGY ZONE 15 in ~ 2 months  Claritin and Singulair as prescribed. Nasal Flonase allergy relief prn. Avoid triggers.    Misc. Devices Misc-Nebulizer for home use with Albuterol solution prn.   4. Moderate persistent asthma without complication  Misc. Devices Misc-nebulizer  Pt to obtain from [facility].   5. Morbid " obesity with BMI of 45.0-49.9, adult ([facility])  Pt to continue decrease of intake of sugar and carbs. Increase walking/exercise.   6. Mood changes (CMS-[facility])  Continue counseling with Chelsea at [facility].   Follow up in 2 months for lab review and check on DM progress/wt loss. Call or return if questions, concerns, or worsening condition.

## 2017-03-30 NOTE — ASSESSMENT & PLAN NOTE
Pt has lost 5 more pounds since last month.  States is working on cutting back on her carb intake.  Feels like her DM-2 is better controlled.

## 2017-03-30 NOTE — ASSESSMENT & PLAN NOTE
"Pt reports she has been seeing [employer] for counseling and reports she is \"co-dependent\".  She is currently not attending any meetings, and I have recommended a [facility] or [facility] for these meetings.  Denies current anxiety or depression.     "

## 2017-03-30 NOTE — ASSESSMENT & PLAN NOTE
"Pt reports her Nebulizer \"dissappeared\" and not able to give  Herself treatments. Has Albuterol and needs nebulizer.  Using regular Inhalers.  "

## 2017-03-30 NOTE — ASSESSMENT & PLAN NOTE
Pt has known DM-2. She has not been able to tolerate Metformin due to side effects of Diarrhea. At last visit, 2/16/17 we started her on Glipizide. She reports  Has not been able to check FSBS due to Meter missing.  She has been taking Glipizide BID.

## 2017-04-13 RX ORDER — HYDROXYZINE HYDROCHLORIDE 25 MG/1
TABLET, FILM COATED ORAL
Qty: 30 TAB | Refills: 2 | Status: SHIPPED | OUTPATIENT
Start: 2017-04-13 | End: 2017-07-17 | Stop reason: SDUPTHER

## 2017-04-13 RX ORDER — CARBAMAZEPINE 200 MG/1
TABLET ORAL
Qty: 90 TAB | Refills: 4 | Status: SHIPPED | OUTPATIENT
Start: 2017-04-13 | End: 2017-10-16 | Stop reason: SDUPTHER

## 2017-04-13 RX ORDER — SIMVASTATIN 40 MG
TABLET ORAL
Qty: 30 TAB | Refills: 4 | Status: SHIPPED | OUTPATIENT
Start: 2017-04-13 | End: 2017-10-04 | Stop reason: SDUPTHER

## 2017-05-22 RX ORDER — GLIPIZIDE 5 MG/1
TABLET ORAL
Qty: 60 TAB | Refills: 2 | Status: SHIPPED | OUTPATIENT
Start: 2017-05-22 | End: 2017-07-19 | Stop reason: SDUPTHER

## 2017-06-26 RX ORDER — HYDROXYZINE HYDROCHLORIDE 25 MG/1
TABLET, FILM COATED ORAL
Qty: 30 TAB | Refills: 2 | Status: SHIPPED | OUTPATIENT
Start: 2017-06-26 | End: 2017-08-22

## 2017-06-26 RX ORDER — AMLODIPINE BESYLATE 10 MG/1
TABLET ORAL
Qty: 30 TAB | Refills: 5 | Status: SHIPPED | OUTPATIENT
Start: 2017-06-26 | End: 2018-01-11 | Stop reason: SDUPTHER

## 2017-07-17 RX ORDER — HYDROXYZINE HYDROCHLORIDE 25 MG/1
TABLET, FILM COATED ORAL
Qty: 30 TAB | Refills: 1 | Status: SHIPPED | OUTPATIENT
Start: 2017-07-17 | End: 2017-09-19 | Stop reason: SDUPTHER

## 2017-07-17 RX ORDER — MONTELUKAST SODIUM 10 MG/1
TABLET ORAL
Qty: 30 TAB | Refills: 4 | Status: SHIPPED | OUTPATIENT
Start: 2017-07-17 | End: 2017-11-29 | Stop reason: SDUPTHER

## 2017-07-20 RX ORDER — GLIPIZIDE 5 MG/1
TABLET ORAL
Qty: 60 TAB | Refills: 0 | Status: SHIPPED | OUTPATIENT
Start: 2017-07-20 | End: 2017-10-16 | Stop reason: SDUPTHER

## 2017-08-15 ENCOUNTER — HOSPITAL ENCOUNTER (OUTPATIENT)
Dept: LAB | Facility: MEDICAL CENTER | Age: 57
End: 2017-08-15
Attending: NURSE PRACTITIONER
Payer: MEDICAID

## 2017-08-15 DIAGNOSIS — E11.9 TYPE 2 DIABETES MELLITUS WITHOUT COMPLICATION, WITHOUT LONG-TERM CURRENT USE OF INSULIN (HCC): ICD-10-CM

## 2017-08-15 LAB
ALBUMIN SERPL BCP-MCNC: 4 G/DL (ref 3.2–4.9)
ALBUMIN/GLOB SERPL: 1.5 G/DL
ALP SERPL-CCNC: 87 U/L (ref 30–99)
ALT SERPL-CCNC: 14 U/L (ref 2–50)
ANION GAP SERPL CALC-SCNC: 8 MMOL/L (ref 0–11.9)
AST SERPL-CCNC: 15 U/L (ref 12–45)
BILIRUB SERPL-MCNC: 0.5 MG/DL (ref 0.1–1.5)
BUN SERPL-MCNC: 16 MG/DL (ref 8–22)
CALCIUM SERPL-MCNC: 8.9 MG/DL (ref 8.5–10.5)
CHLORIDE SERPL-SCNC: 103 MMOL/L (ref 96–112)
CO2 SERPL-SCNC: 30 MMOL/L (ref 20–33)
CREAT SERPL-MCNC: 0.56 MG/DL (ref 0.5–1.4)
EST. AVERAGE GLUCOSE BLD GHB EST-MCNC: 137 MG/DL
GFR SERPL CREATININE-BSD FRML MDRD: >60 ML/MIN/1.73 M 2
GLOBULIN SER CALC-MCNC: 2.6 G/DL (ref 1.9–3.5)
GLUCOSE SERPL-MCNC: 123 MG/DL (ref 65–99)
HBA1C MFR BLD: 6.4 % (ref 0–5.6)
POTASSIUM SERPL-SCNC: 3.4 MMOL/L (ref 3.6–5.5)
PROT SERPL-MCNC: 6.6 G/DL (ref 6–8.2)
SODIUM SERPL-SCNC: 141 MMOL/L (ref 135–145)

## 2017-08-15 PROCEDURE — 86003 ALLG SPEC IGE CRUDE XTRC EA: CPT | Mod: 91

## 2017-08-15 PROCEDURE — 36415 COLL VENOUS BLD VENIPUNCTURE: CPT

## 2017-08-15 PROCEDURE — 83036 HEMOGLOBIN GLYCOSYLATED A1C: CPT

## 2017-08-15 PROCEDURE — 80053 COMPREHEN METABOLIC PANEL: CPT

## 2017-08-15 PROCEDURE — 82785 ASSAY OF IGE: CPT

## 2017-08-16 LAB
A ALTERNATA IGE QN: <0.1 KU/L
A FUMIGATUS IGE QN: <0.1 KU/L
BERMUDA GRASS IGE QN: <0.1 KU/L
BOXELDER IGE QN: <0.1 KU/L
C SPHAEROSPERMUM IGE QN: <0.1 KU/L
CAT DANDER IGE QN: <0.1 KU/L
CMN PIGWEED IGE QN: <0.1 KU/L
COMMON RAGWEED IGE QN: <0.1 KU/L
COTTONWOOD IGE QN: <0.1 KU/L
COW MILK IGE QN: <0.1 KU/L
D FARINAE IGE QN: <0.1 KU/L
D PTERONYSS IGE QN: <0.1 KU/L
DEPRECATED MISC ALLERGEN IGE RAST QL: NORMAL
DOG DANDER IGE QN: <0.1 KU/L
IGE SERPL-ACNC: 15 KU/L
M RACEMOSUS IGE QN: <0.1 KU/L
MOUSE EPITH IGE QN: <0.1 KU/L
MT JUNIPER IGE QN: <0.1 KU/L
MUGWORT IGE QN: <0.1 KU/L
OLIVE POLN IGE QN: <0.1 KU/L
P NOTATUM IGE QN: <0.1 KU/L
PEANUT IGE QN: <0.1 KU/L
ROACH IGE QN: 0.1 KU/L
SALTWORT IGE QN: <0.1 KU/L
TIMOTHY IGE QN: <0.1 KU/L
WHITE ELM IGE QN: <0.1 KU/L
WHITE MULBERRY IGE QN: <0.1 KU/L
WHITE OAK IGE QN: <0.1 KU/L

## 2017-08-22 ENCOUNTER — OFFICE VISIT (OUTPATIENT)
Dept: MEDICAL GROUP | Facility: MEDICAL CENTER | Age: 57
End: 2017-08-22
Attending: NURSE PRACTITIONER
Payer: MEDICAID

## 2017-08-22 VITALS
TEMPERATURE: 97.3 F | HEIGHT: 61 IN | WEIGHT: 239 LBS | HEART RATE: 100 BPM | BODY MASS INDEX: 45.12 KG/M2 | RESPIRATION RATE: 20 BRPM | DIASTOLIC BLOOD PRESSURE: 80 MMHG | SYSTOLIC BLOOD PRESSURE: 120 MMHG | OXYGEN SATURATION: 93 %

## 2017-08-22 DIAGNOSIS — Z12.39 SCREENING FOR BREAST CANCER: ICD-10-CM

## 2017-08-22 DIAGNOSIS — E11.9 TYPE 2 DIABETES MELLITUS WITHOUT COMPLICATION, WITHOUT LONG-TERM CURRENT USE OF INSULIN (HCC): ICD-10-CM

## 2017-08-22 DIAGNOSIS — J45.40 MODERATE PERSISTENT ASTHMA WITHOUT COMPLICATION: ICD-10-CM

## 2017-08-22 DIAGNOSIS — I10 ESSENTIAL HYPERTENSION: ICD-10-CM

## 2017-08-22 DIAGNOSIS — S89.91XA INJURY OF RIGHT LOWER EXTREMITY, INITIAL ENCOUNTER: ICD-10-CM

## 2017-08-22 DIAGNOSIS — Z12.11 SCREEN FOR COLON CANCER: ICD-10-CM

## 2017-08-22 DIAGNOSIS — Z91.09 ENVIRONMENTAL ALLERGIES: ICD-10-CM

## 2017-08-22 DIAGNOSIS — E66.01 MORBID OBESITY WITH BMI OF 45.0-49.9, ADULT (HCC): ICD-10-CM

## 2017-08-22 PROCEDURE — 99213 OFFICE O/P EST LOW 20 MIN: CPT | Performed by: NURSE PRACTITIONER

## 2017-08-22 PROCEDURE — 99214 OFFICE O/P EST MOD 30 MIN: CPT | Performed by: NURSE PRACTITIONER

## 2017-08-22 RX ORDER — HYDROCODONE BITARTRATE AND ACETAMINOPHEN 5; 325 MG/1; MG/1
1 TABLET ORAL EVERY 8 HOURS PRN
Qty: 21 TAB | Refills: 0 | Status: SHIPPED | OUTPATIENT
Start: 2017-08-22 | End: 2019-02-06

## 2017-08-22 RX ORDER — MELOXICAM 15 MG/1
15 TABLET ORAL DAILY
COMMUNITY
Start: 2017-08-17 | End: 2017-10-04

## 2017-08-22 ASSESSMENT — PATIENT HEALTH QUESTIONNAIRE - PHQ9: CLINICAL INTERPRETATION OF PHQ2 SCORE: 0

## 2017-08-22 NOTE — PROGRESS NOTES
Chief Complaint: Right Leg Pain/Injury    HPI:  Natalie presents to the clinic for results and medication discussion.    Her PMH includes:  Anxiety/Situtional Stress  Asthma  HTN  Hyperlipidemia  Obesity  Vitamin D Deficiency  DM-2  Right Knee DJD  Splenectomy  Tonsillectomy    Cholecystectomy  Chronic Hoarse Throat    Established with    Orthopedics- Dr Ospina ([facility])    Referrals Approved:  Psychiatry- DR Jin and assoc  [id number]      Review of Records show  3/30/17 Clinic visit for Recheck of BP, Alergie, nebulizer Rx and New Glucometer.  3/1/17 Orthopedic Appt- DR Cowart- Bilat knee injections completed.  17 Clinic visit for adjustment of BP medication---> Increase Valsartan dose/HTN, Adding Glipizide PO as not tolerating Metformin.  16 Clinic visit for anxiety/stress due to family death. Referred to Psychiatry and Psychology(Counseling), RX Xanax and Atarax.  16 Bilat Knee injections by Dr Sofya RICO MD  16 Bilat Knee Injections by JACKY SAMANO, Dr Cowart for DJD to knees.  16 Clinic visit for elevated BP, Bleeding Mole concern, Referred to Dermatology ([place] Provider)  16 Last Clinic Visit for chronic hoarse throat, ACE Inhibitor stopped and Pt started on Valsartan, Referral to Allergist and ENT    [place]  Report  16 Xanax 0.25 # 20 prescribed by me  -----------------------------------------------------------    Results Review:  8/15/17 CMP normal except K==3.4, Blood Sugar= 123. A1C= 6.4, Allergy Zone 15 all negative.    Type 2 diabetes mellitus without complication  We reviewed her recent labs including   A1c= 6.4 and fasting BS= 123.  She reports she is taking Glipizide as prescribed.  Denies and lows.  I have ordered referral to Optometrist for Retina Exam.  Not testing sugars.    Environmental allergies  We reviewed her normal Zone 15 Allergy panel.  Pt reports no problems with allergies this summer.    Morbid obesity with  BMI of 45.0-49.9, adult ([facility])  Pt is overweight.  We discussed how this affects her DM-2  Pt encouraged to cut back on sugar/carbs and increase exercise to lose wt.    Essential hypertension  BP normotensive (120/80)  in clinic today. Known hx of HTN. Pt stating that they have been taking their medication as prescribed without adverse effect. Pt denies HA, vision changes, neurologic deficits, CP, SOB.     Moderate persistent asthma  Pt reports breathing good and no problems with allergies    Injury of right lower extremity  Pt reports about a month ago hurt right posterior upper leg when twisting while lifting a heavy pot.  Seen by Dr Duffy reports pain to right posterior knee and difficulty walking with weak feeling to knee posterior area.  Brace not helping. Used Athletic tape to tape on brace and helped.    Sees Dr Cowart this Thursday.  Was using Meloxicam and it helped some.       Patient Active Problem List    Diagnosis Date Noted   • Injury of right lower extremity 08/22/2017   • Environmental allergies 03/30/2017   • Morbid obesity with BMI of 45.0-49.9, adult ([facility]) 02/16/2017   • Mood changes (CMS-HCC) 12/13/2016   • Atypical mole 08/01/2016   • Hoarseness, persistent 07/07/2016   • Insomnia 07/07/2016   • Hyperlipidemia 06/09/2016   • Osteoarthritis of knee 04/14/2016   • Vision problems 03/24/2016   • Right knee pain 03/24/2016   • Immunization deficiency 03/24/2016   • Vitamin D deficiency 11/12/2015   • Hyperglycemia 11/12/2015   • Type 2 diabetes mellitus without complication (CMS-HCC) 11/12/2015   • Essential hypertension 10/22/2015   • Moderate persistent asthma 10/22/2015   • Episodic headache 10/22/2015   • H/O splenectomy 10/22/2015       Allergies:Voltaren    Current Outpatient Prescriptions   Medication Sig Dispense Refill   • meloxicam (MOBIC) 15 MG tablet Take 15 mg by mouth every day.     • hydrocodone-acetaminophen (NORCO) 5-325 MG Tab per tablet Take 1 Tab by mouth every 8 hours as needed.  21 Tab 0   • glipiZIDE (GLUCOTROL) 5 MG Tab TAKE 1 TABLET ORALLY 2 TIMES DAILY 60 Tab 0   • montelukast (SINGULAIR) 10 MG Tab TAKE 1 TABLET ORALLY ONCE DAILY 30 Tab 4   • hydrOXYzine (ATARAX) 25 MG Tab TAKE 1 TABLET ORALLY NIGHTLY AT BEDTIME IF NEEDED FOR ANXIETY/INSOMNIA 30 Tab 1   • amlodipine (NORVASC) 10 MG Tab TAKE 1 TABLET ORALLY ONCE DAILY 30 Tab 5   • simvastatin (ZOCOR) 40 MG Tab TAKE 1 TABLET ORALLY EVERY EVENING 30 Tab 4   • carbamazepine (TEGRETOL) 200 MG Tab TAKE 1 TABLET ORALLY EVERY MORNING AND 2 TABLETS BY MOUTH EVERY EVENING 90 Tab 4   • glucose blood (TRUE METRIX BLOOD GLUCOSE TEST) strip 1 Strip by Other route as needed. 50 Strip 2   • Blood Glucose Monitoring Suppl (TRUE METRIX AIR GLUCOSE METER) Device 1 Each by Does not apply route every day. 1 Device 0   • valsartan (DIOVAN) 160 MG Tab Take 1 Tab by mouth every day. 30 Tab 5   • ipratropium (ATROVENT) 17 MCG/ACT Aero Soln Inhale 2 Puffs by mouth every 6 hours. 17 g 5   • albuterol 108 (90 BASE) MCG/ACT Aero Soln inhalation aerosol Inhale 2 Puffs by mouth every 6 hours as needed for Shortness of Breath. 8.5 g 5   • albuterol (PROVENTIL) 2.5mg/3ml Nebu Soln solution for nebulization 3 mL by Nebulization route every four hours as needed for Shortness of Breath. 75 mL 3   • TECHLITE LANCETS Misc      • TRUE METRIX BLOOD GLUCOSE TEST strip FINGER STICK BLOOD SUGAR ONCE DAILY FASTING AND IF SYMPTOMATIC 50 Strip 11   • LUMIGAN 0.01 % Solution 1 Drop every bedtime.     • meclizine (ANTIVERT) 25 MG Tab Take 1 Tab by mouth 2 times a day as needed. 30 Tab 1   • vitamin D, Ergocalciferol, (DRISDOL) 88286 UNITS Cap capsule TAKE 1 CAPSULE ORALLY ONCE EVERY SEVEN (7) DAYS 4 Cap 3     No current facility-administered medications for this visit.       Social History   Substance Use Topics   • Smoking status: Former Smoker -- 5 years   • Smokeless tobacco: Never Used   • Alcohol Use: No       Family History   Problem Relation Age of Onset   • Diabetes Mother   "  • Heart Disease Mother        ROS:  Review of Systems   See HPI Above    Exam:  Temperature 36.3 °C (97.4 °F), height 1.549 m (5' 0.98\"), weight 108.41 kg (239 lb), SpO2 93 %.   Filed Vitals:    08/22/17 1057   BP: 120/80   Pulse: 100   Temp: 36.3 °C (97.3 °F)   Resp: 20   Height: 1.549 m (5' 0.98\")   Weight: 108.41 kg (239 lb)   SpO2: 93%     General:  Well nourished, well developed female in NAD  HENT:Head is grossly normal. PERRL.  Neck: Supple. Trachea is midline.  Pulmonary: Clear to ausculation .  Normal effort. No rales, ronchi, or wheezing.   Cardiovascular: Regular rate and rhythm.  Abdomen-Abdomen is soft, No tenderness.  Upper extremities- Strong = . Good ROM  Lower extremities- neg for edema, redness. Mild tenderness to posterior right knee. No swelling noted.   Neuro- A & O x 4. Speech clear and appropriate.     Current medications, allergies, and problem list reviewed with patient and updated in  [facility] today.    Assessment/Plan:  1. Type 2 diabetes mellitus without complication, without long-term current use of insulin ([facility])  REFERRAL TO OPHTHALMOLOGY for Retina exam.  Continue Glipizide and working on losing weight.  Consider in future possible trial off meds if A1C and fasting BS improve.   2. Environmental allergies  Pt to observe for return. Currently in remission   3. Injury of right lower extremity, initial encounter  hydrocodone-acetaminophen (NORCO) 5-325 MG Tab per tablet-Short term RX, no refills.  Continue Meloxicam per Dr Ospina.  Pt to f/u w Dr Ospina this week as planned.   4. Screening for breast cancer  MA-SCREEN MAMMO W/CAD-BILAT   5. Morbid obesity with BMI of 45.0-49.9, adult ([facility])  Patient identified as having weight management issue.  Appropriate orders and counseling given.   6. Essential hypertension  Continue Valsartan and Norvasc.    7. Screen for colon cancer  OCCULT BLOOD FECES IMMUNOASSAY (FIT)   8. Moderate persistent asthma without complication  Continue " use of inhaler prn.     Follow up in 6 weeks for f/u on leg pain. Call or return if questions, concerns, or worsening condition.

## 2017-08-22 NOTE — ASSESSMENT & PLAN NOTE
We reviewed her recent labs including   A1c= 6.4 and fasting BS= 123.  She reports she is taking Glipizide as prescribed.  Denies and lows.  I have ordered referral to Optometrist for Retina Exam.  Not testing sugars.

## 2017-08-22 NOTE — ASSESSMENT & PLAN NOTE
Pt reports about a month ago hurt right posterior upper leg when twisting while lifting a heavy pot.  Seen by Dr Duffy reports pain to right posterior knee and difficulty walking with weak feeling to knee posterior area.  Brace not helping. Used Athletic tape to tape on brace and helped.    Sees Dr Cowart this Thursday.  Was using Meloxicam and it helped some.

## 2017-08-22 NOTE — MR AVS SNAPSHOT
"        Natalie Eugene   2017 10:50 AM   Office Visit   MRN: [id number]    Department[facility]  [facility]   Dept Phone:  [phone]    Description:  Female : 1960   Provider:  JOSS Morrison           Reason for Visit     Results     Leg Injury right       Allergies as of 2017     Allergen Noted Reactions    Voltaren [Diclofenac-Disod Edta] 2016   Itching      You were diagnosed with     Type 2 diabetes mellitus without complication, without long-term current use of insulin (CMS-HCC)   [5271740]       Environmental allergies   [847333]       Injury of right lower extremity, initial encounter   [3629069]       Screening for breast cancer   [682385]       Morbid obesity with BMI of 45.0-49.9, adult (CMS-HCC)   [517696]       Essential hypertension   [8343231]       Screen for colon cancer   [519204]       Moderate persistent asthma without complication   [264415]         Vital Signs     Temperature Height Weight Body Mass Index Oxygen Saturation Smoking Status    36.3 °C (97.4 °F) 1.549 m (5' 0.98\") 108.41 kg (239 lb) 45.18 kg/m2 93% Former Smoker      Basic Information     Date Of Birth Sex Race Ethnicity Preferred Language    1960 Female White Non- English      Your appointments     Oct 04, 2017 10:50 AM   Established Patient with JOSS Morrison   [facility] ([facility])    [address] [place] [id number]   [phone]           You will be receiving a confirmation call a few days before your appointment from our automated call confirmation system.              Problem List              ICD-10-CM Priority Class Noted - Resolved    Essential hypertension I10   10/22/2015 - Present    Moderate persistent asthma J45.40   10/22/2015 - Present    Episodic headache R51   10/22/2015 - Present    H/O splenectomy Z90.81   10/22/2015 - Present    Vitamin D deficiency E55.9   2015 - Present    Hyperglycemia R73.9   2015 - Present    Type 2 " diabetes mellitus without complication (CMS-HCC) E11.9   11/12/2015 - Present    Vision problems H54.7   3/24/2016 - Present    Right knee pain M25.561   3/24/2016 - Present    Immunization deficiency Z28.3   3/24/2016 - Present    Osteoarthritis of knee M17.10   4/14/2016 - Present    Hyperlipidemia E78.5   6/9/2016 - Present    Hoarseness, persistent R49.0   7/7/2016 - Present    Insomnia G47.00   7/7/2016 - Present    Atypical mole D22.9   8/1/2016 - Present    Mood changes (CMS-HCC) F39   12/13/2016 - Present    Morbid obesity with BMI of 45.0-49.9, adult ([facility]) E66.01, Z68.42   2/16/2017 - Present    Environmental allergies Z91.09   3/30/2017 - Present    Injury of right lower extremity S89.91XA   8/22/2017 - Present      Health Maintenance        Date Due Completion Dates    IMM HEP B VACCINE (1 of 3 - Primary Series) 1960 ---    DIABETES MONOFILAMENT / LE EXAM 1960 ---    RETINAL SCREENING 2/23/1978 ---    PAP SMEAR 2/23/1981 ---    MAMMOGRAM 2/23/2000 ---    COLONOSCOPY 2/23/2010 ---    IMM INFLUENZA (1) 9/1/2017 ---    FASTING LIPID PROFILE 2/14/2018 2/14/2017, 4/8/2016, 10/24/2015    URINE ACR / MICROALBUMIN 2/14/2018 2/14/2017    A1C SCREENING 2/15/2018 8/15/2017, 2/14/2017, 4/8/2016, 3/18/2016    SERUM CREATININE 8/15/2018 8/15/2017, 2/14/2017, 4/8/2016, 3/18/2016, 10/24/2015    IMM PNEUMOCOCCAL 19-64 (ADULT) HIGHEST RISK SERIES (3 of 3 - PPSV23) 2/1/2020 4/14/2016, 2/1/2015    IMM DTaP/Tdap/Td Vaccine (2 - Td) 2/1/2025 2/1/2015            Current Immunizations     13-VALENT PCV PREVNAR 4/14/2016 10:38 AM    Pneumococcal polysaccharide vaccine (PPSV-23) 2/1/2015    Tdap Vaccine 2/1/2015      Below and/or attached are the medications your provider expects you to take. Review all of your home medications and newly ordered medications with your provider and/or pharmacist. Follow medication instructions as directed by your provider and/or pharmacist. Please keep your medication list with you and  share with your provider. Update the information when medications are discontinued, doses are changed, or new medications (including over-the-counter products) are added; and carry medication information at all times in the event of emergency situations     Allergies:  VOLTAREN - Itching               Medications  Valid as of: August 22, 2017 - 11:22 AM    Generic Name Brand Name Tablet Size Instructions for use    Albuterol Sulfate (Aero Soln) albuterol 108 (90 Base) MCG/ACT Inhale 2 Puffs by mouth every 6 hours as needed for Shortness of Breath.        Albuterol Sulfate (Nebu Soln) PROVENTIL 2.5mg/3ml 3 mL by Nebulization route every four hours as needed for Shortness of Breath.        AmLODIPine Besylate (Tab) NORVASC 10 MG TAKE 1 TABLET ORALLY ONCE DAILY        Bimatoprost (Solution) LUMIGAN 0.01 % 1 Drop every bedtime.        Blood Glucose Monitoring Suppl (Device) TRUE METRIX AIR GLUCOSE METER  1 Each by Does not apply route every day.        CarBAMazepine (Tab) TEGRETOL 200 MG TAKE 1 TABLET ORALLY EVERY MORNING AND 2 TABLETS BY MOUTH EVERY EVENING        Ergocalciferol (Cap) DRISDOL 87064 units TAKE 1 CAPSULE ORALLY ONCE EVERY SEVEN (7) DAYS        GlipiZIDE (Tab) GLUCOTROL 5 MG TAKE 1 TABLET ORALLY 2 TIMES DAILY        Glucose Blood (Strip) TRUE METRIX BLOOD GLUCOSE TEST  FINGER STICK BLOOD SUGAR ONCE DAILY FASTING AND IF SYMPTOMATIC        Glucose Blood (Strip) glucose blood  1 Strip by Other route as needed.        Hydrocodone-Acetaminophen (Tab) NORCO 5-325 MG Take 1 Tab by mouth every 8 hours as needed.        HydrOXYzine HCl (Tab) ATARAX 25 MG TAKE 1 TABLET ORALLY NIGHTLY AT BEDTIME IF NEEDED FOR ANXIETY/INSOMNIA        Ipratropium Bromide HFA (Aero Soln) ATROVENT 17 MCG/ACT Inhale 2 Puffs by mouth every 6 hours.        Lancets (Misc) TECHLITE LANCETS          Meclizine HCl (Tab) ANTIVERT 25 MG Take 1 Tab by mouth 2 times a day as needed.        Meloxicam (Tab) MOBIC 15 MG Take 15 mg by mouth every day.         Montelukast Sodium (Tab) SINGULAIR 10 MG TAKE 1 TABLET ORALLY ONCE DAILY        Simvastatin (Tab) ZOCOR 40 MG TAKE 1 TABLET ORALLY EVERY EVENING        Valsartan (Tab) DIOVAN 160 MG Take 1 Tab by mouth every day.        .                 Medicines prescribed today were sent to:     [facility] [place] [facility] [address]    [address] [place] [address] [place] [address]    Phone: [phone] Fax: [phone]    Open 24 Hours?: No      Medication refill instructions:       If your prescription bottle indicates you have medication refills left, it is not necessary to call your provider’s office. Please contact your pharmacy and they will refill your medication.    If your prescription bottle indicates you do not have any refills left, you may request refills at any time through one of the following ways: The online [employer] system (except Urgent Care), by calling your provider’s office, or by asking your pharmacy to contact your provider’s office with a refill request. Medication refills are processed only during regular business hours and may not be available until the next business day. Your provider may request additional information or to have a follow-up visit with you prior to refilling your medication.   *Please Note: Medication refills are assigned a new Rx number when refilled electronically. Your pharmacy may indicate that no refills were authorized even though a new prescription for the same medication is available at the pharmacy. Please request the medicine by name with the pharmacy before contacting your provider for a refill.        Your To Do List     Future Labs/Procedures Complete By Expires    OCCULT BLOOD FECES IMMUNOASSAY (FIT)  As directed 8/22/2018      Referral     A referral request has been sent to our patient care coordination department. Please allow 3-5 business days for us to process this request and contact you either by phone or mail. If you do not hear from us by the 5th business  day, please call us at [phone].        Other Notes About Your Plan     7/26/17 Dr Ospina appt, Meloxicam for DJD knees.  6/1/17 DR Cowart, Bilat Knee injections. Future will need knee replacement  3/1/17 DR Cowart- Bilat Knee Injections tx.  12/6/16 DR Cowart ([facility]) Bilat knee injections for severe DJD to both knees.  8/17/16 Dr ospina- Joint injection. F/u 3 months  5/19/16 Orthopedic [facility] appt- DR Ospina: Bilat Knee Injections. F/u 3-4 months.           [employer] Access Code: Activation code not generated  Current [employer] Status: Active

## 2017-08-22 NOTE — ASSESSMENT & PLAN NOTE
Pt is overweight.  We discussed how this affects her DM-2  Pt encouraged to cut back on sugar/carbs and increase exercise to lose wt.

## 2017-09-02 ENCOUNTER — HOSPITAL ENCOUNTER (OUTPATIENT)
Facility: MEDICAL CENTER | Age: 57
End: 2017-09-02
Attending: NURSE PRACTITIONER
Payer: MEDICAID

## 2017-09-02 PROCEDURE — 82274 ASSAY TEST FOR BLOOD FECAL: CPT

## 2017-09-07 DIAGNOSIS — R19.5 POSITIVE FIT (FECAL IMMUNOCHEMICAL TEST): ICD-10-CM

## 2017-09-07 DIAGNOSIS — Z12.11 SCREEN FOR COLON CANCER: ICD-10-CM

## 2017-09-07 LAB — HEMOCCULT STL QL IA: POSITIVE

## 2017-10-04 ENCOUNTER — OFFICE VISIT (OUTPATIENT)
Dept: MEDICAL GROUP | Facility: MEDICAL CENTER | Age: 57
End: 2017-10-04
Attending: NURSE PRACTITIONER
Payer: MEDICAID

## 2017-10-04 VITALS
WEIGHT: 239 LBS | BODY MASS INDEX: 45.12 KG/M2 | TEMPERATURE: 97 F | HEART RATE: 80 BPM | OXYGEN SATURATION: 93 % | HEIGHT: 61 IN | SYSTOLIC BLOOD PRESSURE: 120 MMHG | RESPIRATION RATE: 16 BRPM | DIASTOLIC BLOOD PRESSURE: 80 MMHG

## 2017-10-04 DIAGNOSIS — E66.01 MORBID OBESITY WITH BMI OF 45.0-49.9, ADULT (HCC): ICD-10-CM

## 2017-10-04 DIAGNOSIS — Z12.11 SCREEN FOR COLON CANCER: ICD-10-CM

## 2017-10-04 DIAGNOSIS — J45.901 MILD ASTHMA WITH EXACERBATION, UNSPECIFIED WHETHER PERSISTENT: ICD-10-CM

## 2017-10-04 DIAGNOSIS — J45.30 MILD PERSISTENT ASTHMA, UNCOMPLICATED: ICD-10-CM

## 2017-10-04 DIAGNOSIS — E78.49 OTHER HYPERLIPIDEMIA: ICD-10-CM

## 2017-10-04 DIAGNOSIS — E55.9 VITAMIN D DEFICIENCY: ICD-10-CM

## 2017-10-04 DIAGNOSIS — R19.5 FECAL OCCULT BLOOD TEST POSITIVE: ICD-10-CM

## 2017-10-04 DIAGNOSIS — I10 ESSENTIAL HYPERTENSION: ICD-10-CM

## 2017-10-04 DIAGNOSIS — E11.9 TYPE 2 DIABETES MELLITUS WITHOUT COMPLICATION, WITHOUT LONG-TERM CURRENT USE OF INSULIN (HCC): ICD-10-CM

## 2017-10-04 DIAGNOSIS — Z13.29 SCREENING FOR THYROID DISORDER: ICD-10-CM

## 2017-10-04 DIAGNOSIS — J45.40 MODERATE PERSISTENT ASTHMA WITHOUT COMPLICATION: ICD-10-CM

## 2017-10-04 DIAGNOSIS — Z12.39 SCREENING FOR BREAST CANCER: ICD-10-CM

## 2017-10-04 PROCEDURE — 99213 OFFICE O/P EST LOW 20 MIN: CPT | Performed by: NURSE PRACTITIONER

## 2017-10-04 RX ORDER — SIMVASTATIN 40 MG
40 TABLET ORAL EVERY EVENING
Qty: 30 TAB | Refills: 4 | Status: SHIPPED | OUTPATIENT
Start: 2017-10-04 | End: 2017-11-29

## 2017-10-04 RX ORDER — VALSARTAN 160 MG/1
160 TABLET ORAL DAILY
Qty: 30 TAB | Refills: 5 | Status: SHIPPED | OUTPATIENT
Start: 2017-10-04 | End: 2018-04-18 | Stop reason: SDUPTHER

## 2017-10-04 RX ORDER — ALBUTEROL SULFATE 90 UG/1
2 AEROSOL, METERED RESPIRATORY (INHALATION) EVERY 6 HOURS PRN
Qty: 8.5 G | Refills: 5 | Status: SHIPPED | OUTPATIENT
Start: 2017-10-04 | End: 2018-09-18

## 2017-10-04 NOTE — ASSESSMENT & PLAN NOTE
"Pt has been over weight for some time, also has DM-2.  She has cut back on \"double carbs\" and has lost over 12 lbs.  WE discussed cutting back on sugar/carbohydrates in her diet.  "

## 2017-10-04 NOTE — ASSESSMENT & PLAN NOTE
Pt reports this am Fasting BS= 119.  Her 8/15/17 A1c= 6.4, fasting BS= 123.  Taking Glipizide BID.

## 2017-10-04 NOTE — ASSESSMENT & PLAN NOTE
/80     in clinic today. Known hx of HTN.Pt stating that they have been taking their medication (Amlodipine, Valsartan) as prescribed without adverse effect. Pt denies HA, vision changes, neurologic deficits, CP, SOB.

## 2017-10-04 NOTE — ASSESSMENT & PLAN NOTE
9/7/17 FIT test positive---> Referred to GI  Pt wants to re-do Stool FIT test.  Denies dark or bloody stools.  Denies Family hx of Colon Cancer.

## 2017-10-04 NOTE — PROGRESS NOTES
Chief Complaint: Positive FIT test discussion, Med Refills    HPI:  Natalie presents to the clinic for med refills, discussion about positive FIT stool test.    Her PMH includes:    Anxiety/Situtional Stress  Asthma  HTN  Hyperlipidemia  Obesity  Vitamin D Deficiency  DM-2  Right Knee DJD  Splenectomy  Tonsillectomy    Cholecystectomy  Chronic Hoarse Throat     Established with    Orthopedics- Dr Ospina ([facility])     Previous Referrals Approved:  Psychiatry- DR Jin and assoc  [id number]        Review of Records show  17 FIT Stool Test Positive---> Referred to GI MD (DR Zuniga)  17 Clinic visit for right leg pain/injury, RX for Norco and instructed to f/u w her Orthopedic MD, Dr Ospina  3/30/17 Clinic visit for Recheck of BP, Alergie, nebulizer Rx and New Glucometer.  3/1/17 Orthopedic Appt- DR Cowart- Bilat knee injections completed.  17 Clinic visit for adjustment of BP medication---> Increase Valsartan dose/HTN, Adding Glipizide PO as not tolerating Metformin.  16 Clinic visit for anxiety/stress due to family death. Referred to Psychiatry and Psychology(Counseling), RX Xanax and Atarax.  16 Bilat Knee injections by Dr Sofya RICO MD  16 Bilat Knee Injections by Dr Sofya RICO MD for DJD to knees.  16 Clinic visit for elevated BP, Bleeding Mole concern, Referred to Dermatology ([place] Provider)  16 Last Clinic Visit for chronic hoarse throat, ACE Inhibitor stopped and Pt started on Valsartan, Referral to Allergist and ENT     [place]  Report  16 Xanax 0.25 # 20 prescribed by me  -----------------------------------------------------------     Results Review:  17 FIT Stool Test Positive for Occult Blood    Type 2 diabetes mellitus without complication  Pt reports this am Fasting BS= 119.  Her 8/15/17 A1c= 6.4, fasting BS= 123.  Taking Glipizide BID.    Essential hypertension  /80     in clinic today. Known hx of  "HTN.Pt stating that they have been taking their medication (Amlodipine, Valsartan) as prescribed without adverse effect. Pt denies HA, vision changes, neurologic deficits, CP, SOB.     Fecal occult blood test positive  9/7/17 FIT test positive---> Referred to GI  Pt wants to re-do Stool FIT test.  Denies dark or bloody stools.  Denies Family hx of Colon Cancer.    Morbid obesity with BMI of 45.0-49.9, adult ([facility])  Pt has been over weight for some time, also has DM-2.  She has cut back on \"double carbs\" and has lost over 12 lbs.  WE discussed cutting back on sugar/carbohydrates in her diet.    Moderate persistent asthma  States is doing well and not needing to use inhaler except rarely.  Needs refill.      Patient Active Problem List    Diagnosis Date Noted   • Fecal occult blood test positive 10/04/2017   • Injury of right lower extremity 08/22/2017   • Environmental allergies 03/30/2017   • Morbid obesity with BMI of 45.0-49.9, adult ([facility]) 02/16/2017   • Mood changes (CMS-HCC) 12/13/2016   • Hoarseness, persistent 07/07/2016   • Insomnia 07/07/2016   • Hyperlipidemia 06/09/2016   • Osteoarthritis of knee 04/14/2016   • Vision problems 03/24/2016   • Right knee pain 03/24/2016   • Vitamin D deficiency 11/12/2015   • Type 2 diabetes mellitus without complication (CMS-HCC) 11/12/2015   • Essential hypertension 10/22/2015   • Moderate persistent asthma 10/22/2015   • Episodic headache 10/22/2015   • H/O splenectomy 10/22/2015       Allergies:Voltaren [diclofenac-disod edta]    Current Outpatient Prescriptions   Medication Sig Dispense Refill   • ipratropium (ATROVENT) 17 MCG/ACT Aero Soln Inhale 2 Puffs by mouth every 6 hours. 17 g 5   • albuterol 108 (90 Base) MCG/ACT Aero Soln inhalation aerosol Inhale 2 Puffs by mouth every 6 hours as needed for Shortness of Breath. 8.5 g 5   • valsartan (DIOVAN) 160 MG Tab Take 1 Tab by mouth every day. 30 Tab 5   • simvastatin (ZOCOR) 40 MG Tab Take 1 Tab by mouth every evening. " "30 Tab 4   • Cholecalciferol 4000 units Cap Take 1 Capsule by mouth every day. 30 Cap 5   • hydrOXYzine (ATARAX) 25 MG Tab TAKE 1 TABLET ORALLY NIGHTLY AT BEDTIME IF NEEDED FOR ANXIETY/INSOMNIA 30 Tab 2   • glipiZIDE (GLUCOTROL) 5 MG Tab TAKE 1 TABLET ORALLY 2 TIMES DAILY 60 Tab 0   • montelukast (SINGULAIR) 10 MG Tab TAKE 1 TABLET ORALLY ONCE DAILY 30 Tab 4   • amlodipine (NORVASC) 10 MG Tab TAKE 1 TABLET ORALLY ONCE DAILY 30 Tab 5   • carbamazepine (TEGRETOL) 200 MG Tab TAKE 1 TABLET ORALLY EVERY MORNING AND 2 TABLETS BY MOUTH EVERY EVENING 90 Tab 4   • glucose blood (TRUE METRIX BLOOD GLUCOSE TEST) strip 1 Strip by Other route as needed. 50 Strip 2   • Blood Glucose Monitoring Suppl (TRUE METRIX AIR GLUCOSE METER) Device 1 Each by Does not apply route every day. 1 Device 0   • albuterol (PROVENTIL) 2.5mg/3ml Nebu Soln solution for nebulization 3 mL by Nebulization route every four hours as needed for Shortness of Breath. 75 mL 3   • TECHLITE LANCETS Misc      • TRUE METRIX BLOOD GLUCOSE TEST strip FINGER STICK BLOOD SUGAR ONCE DAILY FASTING AND IF SYMPTOMATIC 50 Strip 11   • LUMIGAN 0.01 % Solution 1 Drop every bedtime.     • meclizine (ANTIVERT) 25 MG Tab Take 1 Tab by mouth 2 times a day as needed. 30 Tab 1   • hydrocodone-acetaminophen (NORCO) 5-325 MG Tab per tablet Take 1 Tab by mouth every 8 hours as needed. 21 Tab 0   • vitamin D, Ergocalciferol, (DRISDOL) 05039 UNITS Cap capsule TAKE 1 CAPSULE ORALLY ONCE EVERY SEVEN (7) DAYS 4 Cap 3     No current facility-administered medications for this visit.        Social History   Substance Use Topics   • Smoking status: Former Smoker     Years: 5.00   • Smokeless tobacco: Never Used   • Alcohol use No       Family History   Problem Relation Age of Onset   • Diabetes Mother    • Heart Disease Mother        ROS:  Review of Systems   See HPI Above    Exam:  Blood pressure 120/80, pulse 80, temperature 36.1 °C (97 °F), resp. rate 16, height 1.549 m (5' 0.98\"), weight " 108.4 kg (239 lb), SpO2 93 %.  General:  Well nourished, over-weight,  well developed female in NAD  HENT:Head is grossly normal. PERRL.  Neck: Supple. Trachea is midline.  Pulmonary: Clear to ausculation .  Normal effort. No rales, ronchi, or wheezing.   Cardiovascular: Regular rate and rhythm.  Abdomen-Abdomen is soft, No tenderness.  Upper extremities- Strong = . Good ROM  Lower extremities- neg for edema, redness, tenderness.  Neuro- A & O x 4. Speech clear and appropriate.     Current medications, allergies, and problem list reviewed with patient and updated in  [facility] today.    Assessment/Plan:  1. Type 2 diabetes mellitus without complication, without long-term current use of insulin (CMS-HCC)  REFERRAL TO OPHTHALMOLOGY-Retina evaluation(Diabetic)    CBC WITH DIFFERENTIAL    COMP METABOLIC PANEL    HEMOGLOBIN A1C    MICROALBUMIN CREAT RATIO URINE (LAB COLLECT)   2. Essential hypertension  valsartan (DIOVAN) 160 MG Tab   3. Fecal occult blood test positive  OCCULT BLOOD FECES IMMUNOASSAY-REPEAT test per Patient request.  Pt given GI MD contact info and directions to call for appt and schedule Colonoscopy to r/o colon polyps.   4. Morbid obesity with BMI of 45.0-49.9, adult ([facility])  Pt to continue cutting back on Sugar/carbs   5. Screening for breast cancer  MA-SCREEN MAMMO W/CAD-BILAT   6. Screen for colon cancer  OCCULT BLOOD FECES IMMUNOASSAY   7. Mild asthma with exacerbation, unspecified whether persistent  ipratropium (ATROVENT) 17 MCG/ACT Aero Soln   8. Mild persistent asthma, uncomplicated  albuterol 108 (90 Base) MCG/ACT Aero Soln inhalation aerosol   9. Other hyperlipidemia  simvastatin (ZOCOR) 40 MG Tab    LIPID PROFILE   10. Vitamin D deficiency  Cholecalciferol 4000 units Cap    VITAMIN D,25 HYDROXY    VITAMIN B12        12. Screening for thyroid disorder  TSH   Follow up in  2 months for lab results . Call or return if questions, concerns, or worsening condition.

## 2017-10-09 ENCOUNTER — HOSPITAL ENCOUNTER (OUTPATIENT)
Facility: MEDICAL CENTER | Age: 57
End: 2017-10-09
Attending: NURSE PRACTITIONER
Payer: MEDICAID

## 2017-10-09 PROCEDURE — 82274 ASSAY TEST FOR BLOOD FECAL: CPT

## 2017-10-13 DIAGNOSIS — R19.5 FECAL OCCULT BLOOD TEST POSITIVE: ICD-10-CM

## 2017-10-13 DIAGNOSIS — Z12.11 SCREEN FOR COLON CANCER: ICD-10-CM

## 2017-10-13 LAB — HEMOCCULT STL QL IA: POSITIVE

## 2017-10-14 ENCOUNTER — APPOINTMENT (OUTPATIENT)
Dept: RADIOLOGY | Facility: MEDICAL CENTER | Age: 57
End: 2017-10-14
Attending: NURSE PRACTITIONER
Payer: MEDICAID

## 2017-10-16 ENCOUNTER — TELEPHONE (OUTPATIENT)
Dept: MEDICAL GROUP | Facility: MEDICAL CENTER | Age: 57
End: 2017-10-16

## 2017-10-16 RX ORDER — GLIPIZIDE 5 MG/1
5 TABLET ORAL 2 TIMES DAILY
Qty: 60 TAB | Refills: 0 | Status: SHIPPED | OUTPATIENT
Start: 2017-10-16 | End: 2017-11-29 | Stop reason: SDUPTHER

## 2017-10-16 RX ORDER — CARBAMAZEPINE 200 MG/1
TABLET ORAL
Qty: 90 TAB | Refills: 3 | Status: SHIPPED | OUTPATIENT
Start: 2017-10-16 | End: 2018-03-19 | Stop reason: SDUPTHER

## 2017-10-16 NOTE — TELEPHONE ENCOUNTER
Please call Natalie and let her know her 2nd FIT Stool test also came back as positive for hidden blood in the stool.  She will need to see GI for further evaluation and has previously been referred to GI, Please tell her not to worry  But it is important for her to make appt with GI:  Gastroenterology    [address]  [facility] [address]  [phone]  SYLVIA REECE

## 2017-10-16 NOTE — TELEPHONE ENCOUNTER
Was the patient seen in the last year in this department? Yes     Does patient have an active prescription for medications requested? No     Received Request Via: Pharmacy   Future Appointments       Provider Department Center    10/27/2017 10:20 AM [facility] [address]

## 2017-10-30 ENCOUNTER — TELEPHONE (OUTPATIENT)
Dept: MEDICAL GROUP | Facility: MEDICAL CENTER | Age: 57
End: 2017-10-30

## 2017-10-30 DIAGNOSIS — M79.604 CHRONIC PAIN OF RIGHT LOWER EXTREMITY: ICD-10-CM

## 2017-10-30 DIAGNOSIS — G89.29 CHRONIC PAIN OF RIGHT LOWER EXTREMITY: ICD-10-CM

## 2017-10-30 RX ORDER — GABAPENTIN 100 MG/1
100 CAPSULE ORAL 3 TIMES DAILY
Qty: 90 CAP | Refills: 1 | Status: SHIPPED | OUTPATIENT
Start: 2017-10-30 | End: 2018-05-08

## 2017-10-30 NOTE — TELEPHONE ENCOUNTER
1. Caller Name: Natalie                                         Call Back Number: [phone] (home)         Patient approves a detailed voicemail message: yes    Patient called and would like to try and take Gabapentin for the leg pain she has. Her friend recommended this medication for her. Please advise.

## 2017-10-30 NOTE — TELEPHONE ENCOUNTER
Tell Natalie we can do a trial of Gabpapentin but she should start it slowly , as some people get a little   Groggy on it initially, so she should start with just once a day and build up to   3x/day over a week. I will send RX for low dose Gabapentin 100 mg three x a day

## 2017-11-01 ENCOUNTER — OFFICE VISIT (OUTPATIENT)
Dept: MEDICAL GROUP | Facility: MEDICAL CENTER | Age: 57
End: 2017-11-01
Attending: NURSE PRACTITIONER
Payer: MEDICAID

## 2017-11-01 VITALS
DIASTOLIC BLOOD PRESSURE: 72 MMHG | HEART RATE: 78 BPM | SYSTOLIC BLOOD PRESSURE: 115 MMHG | WEIGHT: 235 LBS | TEMPERATURE: 97.3 F | HEIGHT: 61 IN | RESPIRATION RATE: 20 BRPM | BODY MASS INDEX: 44.37 KG/M2 | OXYGEN SATURATION: 91 %

## 2017-11-01 DIAGNOSIS — M79.661 RIGHT CALF PAIN: ICD-10-CM

## 2017-11-01 DIAGNOSIS — E66.01 MORBID OBESITY WITH BMI OF 45.0-49.9, ADULT (HCC): ICD-10-CM

## 2017-11-01 DIAGNOSIS — E11.9 TYPE 2 DIABETES MELLITUS WITHOUT COMPLICATION, WITHOUT LONG-TERM CURRENT USE OF INSULIN (HCC): ICD-10-CM

## 2017-11-01 DIAGNOSIS — R19.5 FECAL OCCULT BLOOD TEST POSITIVE: ICD-10-CM

## 2017-11-01 DIAGNOSIS — S89.91XA INJURY OF RIGHT LOWER EXTREMITY, INITIAL ENCOUNTER: ICD-10-CM

## 2017-11-01 PROCEDURE — 99214 OFFICE O/P EST MOD 30 MIN: CPT | Performed by: NURSE PRACTITIONER

## 2017-11-01 PROCEDURE — 99213 OFFICE O/P EST LOW 20 MIN: CPT | Performed by: NURSE PRACTITIONER

## 2017-11-01 RX ORDER — DICLOFENAC SODIUM 75 MG/1
75 TABLET, DELAYED RELEASE ORAL 2 TIMES DAILY
Qty: 60 TAB | Refills: 1 | Status: SHIPPED | OUTPATIENT
Start: 2017-11-01 | End: 2017-11-13

## 2017-11-01 ASSESSMENT — PAIN SCALES - GENERAL: PAINLEVEL: 4=SLIGHT-MODERATE PAIN

## 2017-11-01 NOTE — PROGRESS NOTES
"    Chief Complaint: Right posterior thigh and calf pains    HPI:  Natalei presents to the clinic for Leg Pain/throbbing, f/u on 2nd pos FIT test    Her PMH includes:  Anxiety/Situtional Stress  Asthma  HTN  Hyperlipidemia  Obesity  Vitamin D Deficiency  DM-2  Right Knee DJD  Splenectomy  Tonsillectomy    Cholecystectomy  Chronic Hoarse Throat  FIT Stool TEST positive (2017 and 10/2017)     Established with    Orthopedics- Dr Ospina ([facility])     Previous Referrals Approved:  Psychiatry- DR Jin and assoc  [id number]       Review of Records show  10/30/17 Telephone Encounter Pt calls asking for RX for Gabapentin r/t leg \"throbbing\"---> RX sent w instructions to start slowly.  10/16/17 Telephone Encounter- Pos 2nd FIT Test. Pt to call GI for appt for colonoscopy  10/4/17 Clinic Visit for REsults of Pos FIT test, Pt not wanting to go to GI and wants repeat Fit Test. Med Refills  17 FIT Stool Test Positive---> Referred to GI MD (DR Zuniga)  17 Clinic visit for right leg pain/injury, RX for Norco and instructed to f/u w her Orthopedic MD, Dr Ospina  3/30/17 Clinic visit for Recheck of BP, Alergie, nebulizer Rx and New Glucometer.  3/1/17 Orthopedic Appt- DR Cowart- Bilat knee injections completed.  17 Clinic visit for adjustment of BP medication---> Increase Valsartan dose/HTN, Adding Glipizide PO as not tolerating Metformin.  16 Clinic visit for anxiety/stress due to family death. Referred to Psychiatry and Psychology(Counseling), RX Xanax and Atarax.  16 Bilat Knee injections by Dr Sofya RICO MD  16 Bilat Knee Injections by Dr Sofya RICO MD for DJD to knees.  16 Clinic visit for elevated BP, Bleeding Mole concern, Referred to Dermatology ([place] Provider)  16 Last Clinic Visit for chronic hoarse throat, ACE Inhibitor stopped and Pt started on Valsartan, Referral to Allergist and ENT     [place]  Report  17 [place] 5/325 # 21 " by me  12/13/16 Xanax 0.25 # 20 prescribed by me  -----------------------------------------------------------     Results Review:  9/7/17 FIT Stool Test Positive for Occult Blood  10/9/17 FIT Stool Test Positive for Occult Blood    Fecal occult blood test positive  We discussed her Sept and Oct Pos FIT tests and my concern r/t polyps vs colon ca  Needs GI Colonoscopy.  Pt reports she made appt w [facility] Consultants for 12/28/17.  Denies any rectal bleeding.    Right calf pain  Pt reports she did not get the message that we had sent in RX for Gabapentin on 10/30/17 per her request.  She reports about a month ago lifting a pot at [employer] and twisted and felt pain to posterior right leg to popliteal area and posterior thigh.   Also gets some pain into right calf. Flex and extend makes it worse.  Denies swelling to area. States some times shooting pain to right lateral lower leg.  Worse w walking or up and down stairs.  Pt does have hx of right knee pains and arthritis previously seen by JACKY, DR Cowart.    Type 2 diabetes mellitus without complication  Pt reports wants retesting for her DM  Is hoping to get off Glipizide which she takes BID.  Has lost wt and we discussed testing    Morbid obesity with BMI of 45.0-49.9, adult ([facility])  Pt states she has been working on losing wt with her diet.  Wt today is 235 lbs down from 239 in Oct and 239 in August.  Encouraged ongoing cutting down on sugar and carbs.  Pt wants retest of A1C as she has lost wt since August.        Patient Active Problem List    Diagnosis Date Noted   • Right calf pain 11/01/2017   • Fecal occult blood test positive 10/04/2017   • Injury of right lower extremity 08/22/2017   • Environmental allergies 03/30/2017   • Morbid obesity with BMI of 45.0-49.9, adult ([facility]) 02/16/2017   • Mood changes (CMS-HCC) 12/13/2016   • Hoarseness, persistent 07/07/2016   • Insomnia 07/07/2016   • Hyperlipidemia 06/09/2016   • Osteoarthritis of knee 04/14/2016   • Vision  problems 03/24/2016   • Right knee pain 03/24/2016   • Vitamin D deficiency 11/12/2015   • Type 2 diabetes mellitus without complication (CMS-[facility]) 11/12/2015   • Essential hypertension 10/22/2015   • Moderate persistent asthma 10/22/2015   • Episodic headache 10/22/2015   • H/O splenectomy 10/22/2015       Allergies:Voltaren [diclofenac-disod edta]    Current Outpatient Prescriptions   Medication Sig Dispense Refill   • diclofenac EC (VOLTAREN) 75 MG Tablet Delayed Response Take 1 Tab by mouth 2 times a day. 60 Tab 1   • gabapentin (NEURONTIN) 100 MG Cap Take 1 Cap by mouth 3 times a day. 90 Cap 1   • carbamazepine (TEGRETOL) 200 MG Tab TAKE 1 TABLET ORALLY EVERY MORNING AND 2 TABLETS BY MOUTH EVERY EVENING 90 Tab 3   • glipiZIDE (GLUCOTROL) 5 MG Tab Take 1 Tab by mouth 2 Times a Day. 60 Tab 0   • ipratropium (ATROVENT) 17 MCG/ACT Aero Soln Inhale 2 Puffs by mouth every 6 hours. 17 g 5   • albuterol 108 (90 Base) MCG/ACT Aero Soln inhalation aerosol Inhale 2 Puffs by mouth every 6 hours as needed for Shortness of Breath. 8.5 g 5   • valsartan (DIOVAN) 160 MG Tab Take 1 Tab by mouth every day. 30 Tab 5   • simvastatin (ZOCOR) 40 MG Tab Take 1 Tab by mouth every evening. 30 Tab 4   • Cholecalciferol 4000 units Cap Take 1 Capsule by mouth every day. 30 Cap 5   • hydrOXYzine (ATARAX) 25 MG Tab TAKE 1 TABLET ORALLY NIGHTLY AT BEDTIME IF NEEDED FOR ANXIETY/INSOMNIA 30 Tab 2   • hydrocodone-acetaminophen (NORCO) 5-325 MG Tab per tablet Take 1 Tab by mouth every 8 hours as needed. 21 Tab 0   • montelukast (SINGULAIR) 10 MG Tab TAKE 1 TABLET ORALLY ONCE DAILY 30 Tab 4   • amlodipine (NORVASC) 10 MG Tab TAKE 1 TABLET ORALLY ONCE DAILY 30 Tab 5   • glucose blood (TRUE METRIX BLOOD GLUCOSE TEST) strip 1 Strip by Other route as needed. 50 Strip 2   • Blood Glucose Monitoring Suppl (TRUE METRIX AIR GLUCOSE METER) Device 1 Each by Does not apply route every day. 1 Device 0   • albuterol (PROVENTIL) 2.5mg/3ml Nebu Soln solution for  "nebulization 3 mL by Nebulization route every four hours as needed for Shortness of Breath. 75 mL 3   • TECHLITE LANCETS Misc      • TRUE METRIX BLOOD GLUCOSE TEST strip FINGER STICK BLOOD SUGAR ONCE DAILY FASTING AND IF SYMPTOMATIC 50 Strip 11   • LUMIGAN 0.01 % Solution 1 Drop every bedtime.     • vitamin D, Ergocalciferol, (DRISDOL) 14549 UNITS Cap capsule TAKE 1 CAPSULE ORALLY ONCE EVERY SEVEN (7) DAYS 4 Cap 3   • meclizine (ANTIVERT) 25 MG Tab Take 1 Tab by mouth 2 times a day as needed. 30 Tab 1     No current facility-administered medications for this visit.        Social History   Substance Use Topics   • Smoking status: Former Smoker     Years: 5.00   • Smokeless tobacco: Never Used   • Alcohol use No       Family History   Problem Relation Age of Onset   • Diabetes Mother    • Heart Disease Mother        ROS:  Review of Systems   See HPI Above        Exam:  Blood pressure 115/72, pulse 78, temperature 36.3 °C (97.3 °F), resp. rate 20, height 1.549 m (5' 0.98\"), weight 106.6 kg (235 lb), SpO2 91 %.  General:  Well nourished, obese, well developed female in mild dsitress.  HENT:Head is grossly normal. PERRL.  Neck: Supple. Trachea is midline.  Pulmonary: Speaks in full sentences. Normal effort.    Cardiovascular: Regular rate and rhythm.  Abdomen-Abdomen is soft, No tenderness.  Upper extremities- Strong = . Good ROM  Lower extremities- neg for edema, redness, tenderness. Report of slight pain with flex/extension of left leg in clinic today.  No swelling or tenderness to  RIGHT thigh or calf to palpation.  Neuro- A & O x 4. Speech clear and appropriate. Walks slowly but with steady gait.     Current medications, allergies, and problem list reviewed with patient and updated in  [facility] today.    Assessment/Plan:  1. Fecal occult blood test positive  Pt to keep appt w GI on 12/28/17. If any rectal bleeding or dark stools to call/return or go to ER.   2. Right calf pain  diclofenac EC (VOLTAREN) 75 MG Tablet " "Delayed Response-take with food    REFERRAL TO PHYSICAL THERAPY Reason for Therapy: Eval/Treat/Report   3. Injury of right lower extremity, initial encounter  diclofenac EC (VOLTAREN) 75 MG Tablet Delayed Response  6\" ace wrap x 2 to right thigh and calf area  Instructions reviewed.     REFERRAL TO PHYSICAL THERAPY Reason for Therapy: Eval/Treat/Report   4. Type 2 diabetes mellitus without complication, without long-term current use of insulin (CMS-HCC)  BASIC METABOLIC PANEL  Continue Glipizide BID at this time.  REduce sugar/carbs in diet and work on losing more wt.    HEMOGLOBIN A1C    TSH   5. Morbid obesity with BMI of 45.0-49.9, adult ([facility])  Pt to continue reducing consumption of sugar and carbs in diet.   Follow up in 3 weeks for review of labs and f/u on leg Call or return if questions, concerns, or worsening condition.  "

## 2017-11-01 NOTE — ASSESSMENT & PLAN NOTE
Pt reports wants retesting for her DM  Is hoping to get off Glipizide which she takes BID.  Has lost wt and we discussed testing

## 2017-11-01 NOTE — ASSESSMENT & PLAN NOTE
2/12/2024              Sunitha Rubio        [address] [place] [id number]         To Whom It May Concern,    Sunitha is currently under my care. She will be undergoing a surgical procedure on Monday, 03/04/2024. She may to return to work on 03/08/2024. If any further information is needed, please have Sunitha contact my office.       Sincerely,    Amanda Esposito MD  [facility], [facility], [place] - OB/GYN  [address]  [facility] [id number] [phone]           Pt states she has been working on losing wt with her diet.  Wt today is 235 lbs down from 239 in Oct and 239 in August.  Encouraged ongoing cutting down on sugar and carbs.  Pt wants retest of A1C as she has lost wt since August.

## 2017-11-01 NOTE — ASSESSMENT & PLAN NOTE
We discussed her Sept and Oct Pos FIT tests and my concern r/t polyps vs colon ca  Needs GI Colonoscopy.  Pt reports she made appt w [facility] for 12/28/17.  Denies any rectal bleeding.

## 2017-11-01 NOTE — ASSESSMENT & PLAN NOTE
Pt reports she did not get the message that we had sent in RX for Gabapentin on 10/30/17 per her request.  She reports about a month ago lifting a pot at [employer] and twisted and felt pain to posterior right leg to popliteal area and posterior thigh.   Also gets some pain into right calf. Flex and extend makes it worse.  Denies swelling to area. States some times shooting pain to right lateral lower leg.  Worse w walking or up and down stairs.  Pt does have hx of right knee pains and arthritis previously seen by JACKY, DR Cowart.

## 2017-11-06 ENCOUNTER — PHYSICAL THERAPY (OUTPATIENT)
Dept: PHYSICAL THERAPY | Facility: REHABILITATION | Age: 57
End: 2017-11-06
Attending: NURSE PRACTITIONER
Payer: MEDICAID

## 2017-11-06 DIAGNOSIS — M79.661 RIGHT CALF PAIN: ICD-10-CM

## 2017-11-06 PROCEDURE — 97161 PT EVAL LOW COMPLEX 20 MIN: CPT

## 2017-11-06 ASSESSMENT — ENCOUNTER SYMPTOMS
EXACERBATED BY: STAIR CLIMBING
QUALITY: THROBBING
ALLEVIATING FACTORS: COLD/ICE
EXACERBATED BY: SQUATTING
PAIN SCALE AT HIGHEST: 10
QUALITY: SHARP
PAIN SCALE: 0
QUALITY: ACHING
QUALITY: SHOOTING
EXACERBATED BY: WALKING
PAIN TIMING: IN THE EVENING
PAIN SCALE AT LOWEST: 0

## 2017-11-06 ASSESSMENT — ACTIVITIES OF DAILY LIVING (ADL): POOR_BALANCE: 1

## 2017-11-06 NOTE — OP THERAPY EVALUATION
Outpatient Physical Therapy  INITIAL EVALUATION    [facility] Physical Therapy [facility]  [address]  [address]  [facility] [address]  Phone:  [phone]  Fax:  [phone]    Date of Evaluation: 2017    Patient: Natalie Eugene  YOB: 1960  MRN: [id number]     Referring Provider: JOSS Krishna  [address] [facility]  [address]  [place] [address]   Referring Diagnosis Right calf pain [M79.661];Injury of right lower extremity, initial encounter [S89.91XA]     Time Calculation  Start time: 1101  Stop time: 1133 Time Calculation (min): 32 minutes     Physical Therapy Occurrence Codes    Date physical therapy treatment started:  17             Chief Complaint: Other (R leg pain)    Visit Diagnoses     ICD-10-CM   1. Right calf pain M79.661         Subjective:   History of Present Illness:     Mechanism of injury:  Pt presents to PT with complaint of posterior-lateral R LE pain of 2 week duration.  Initially started upon lifting and turning with a pot at [employer].  Re-aggravated having to come down 11 flights of stairs at apt during a fire alarm, slipped a little towards the bottom. Meds Rx'd- no relief so far. Denies N+T, no LBP.     R>L chronic knee pain (OA).  On current weight loss plan- lost 40#    Prior level of function:  Works for the [employer], cares for grandchildren.   Sleep disturbance:  Interrupted sleep  Pain:     Current pain ratin    At best pain ratin    At worst pain rating:  10    Location:  Back of knee down to ankle- outer.     Quality:  Throbbing, sharp, shooting and aching    Pain timing:  In the evening    Relieving factors:  Cold/ice    Aggravating factors:  Walking, stair climbing and squatting (Bending the knee back and to the side. )  Social Support:     Lives in:  Apartment (Elevator)    Lives with:  Alone  Diagnostic Tests:     None    Patient Goals:     Patient goals for therapy:  Increased strength and decreased pain      Past Medical History:   Diagnosis  Date   • Asthma    • Hypertension      Past Surgical History:   Procedure Laterality Date   • CHOLECYSTECTOMY  1995   • SPLENECTOMY  1994   • PRIMARY C SECTION  1987   • TONSILLECTOMY  1965     Social History   Substance Use Topics   • Smoking status: Former Smoker     Years: 5.00   • Smokeless tobacco: Never Used   • Alcohol use No     Family and Occupational History     Social History   • Marital status: Single     Spouse name: N/A   • Number of children: N/A   • Years of education: N/A       Objective     Static Posture   General Observations  Asymmetrical weight bearing and shifted left.     Comments  Increased BMI    Neurological Testing     Sensation     Ankle/Foot   Left Ankle/Foot   Intact: light touch    Right Ankle/Foot   Intact: light touch     Reflexes   Left   Patellar (L4): trace (1+)  Achilles (S1): trace (1+)    Right   Patellar (L4): trace (1+)  Achilles (S1): trace (1+)    Active Range of Motion     Lumbar   All lumbar active range of motion within functional limits  Left Knee   Normal active range of motion    Right Knee   Flexion: 120 degrees with pain  Prone flexion: 80 degrees with pain    Joint Play   Spine     Central PA [place]        L1: WFL       L2: WFL       L3: WFL       L4: WFL       L5: WFL        Tests       Lumbar spine (right)     Positive slump.     Right Knee   Positive Thessaly's test at 5 degrees.   Strength: grossly 4/5 bilat LEs without pain in any direction.    Unable to SLS on the R due to pain    Exercises/Treatment    Assessment, Response and Plan:   Impairments: abnormal or restricted ROM, impaired balance, impaired physical strength, lacks appropriate home exercise program and pain with function    Assessment details:  Ms. Eugene is a 57 y.o female presenting with acute R posterior LE pain.  PT eval is most consistent with a neural source.  Unclear if this may be lumbar radiculopathy vs more peripheral at the common peroneal nerve.  She shows decreased ROM, increase pain  with compression to the lateral fibular area and positive neural tension.  Skilled PT services are indicated to address the mentioned functional limitations and enhance QOL.     Prognosis: good    Goals:   Short Term Goals:   - Improve knee PROM to full and painless  - Able to evenly distribute weight R to L  - Decrease pain to no greater than 5/10   Short term goal time span:  2-4 weeks      Long Term Goals:    - LEFS at least 55/80  - Able to walk 1 mile without increased pain  - Negative neural tension  - Indep HEP  Long term goal time span:  4-6 weeks    Plan:   Planned therapy interventions:  Neuromuscular re-education, patient education, therapeutic exercise, therapeutic activities, home exercise program and modalities  Frequency:  2x week  Duration in weeks:  4  Discussed with:  Patient      Functional Limitation G-Codes and Severity Modifiers  Current:     Goal:         Referring provider co-signature:  I have reviewed this plan of care and my co-signature certifies the need for services.  Certification Dates:   From 11/6/17     To 2/6/17    Physician Signature: ________________________________ Date: ______________

## 2017-11-13 ENCOUNTER — OFFICE VISIT (OUTPATIENT)
Dept: MEDICAL GROUP | Facility: MEDICAL CENTER | Age: 57
End: 2017-11-13
Attending: NURSE PRACTITIONER
Payer: MEDICAID

## 2017-11-13 VITALS
TEMPERATURE: 97.2 F | DIASTOLIC BLOOD PRESSURE: 86 MMHG | WEIGHT: 235 LBS | OXYGEN SATURATION: 93 % | RESPIRATION RATE: 16 BRPM | HEIGHT: 61 IN | HEART RATE: 96 BPM | SYSTOLIC BLOOD PRESSURE: 136 MMHG | BODY MASS INDEX: 44.37 KG/M2

## 2017-11-13 DIAGNOSIS — T23.112A: ICD-10-CM

## 2017-11-13 PROCEDURE — 99214 OFFICE O/P EST MOD 30 MIN: CPT | Performed by: NURSE PRACTITIONER

## 2017-11-13 PROCEDURE — 99212 OFFICE O/P EST SF 10 MIN: CPT | Performed by: NURSE PRACTITIONER

## 2017-11-13 NOTE — ASSESSMENT & PLAN NOTE
Pt reports popping popcorn in pot on stove.  Burn to left thumb base Saturday. It was red but this am one of the two blisters popped.  Pt reports is mildly tender and wonders if we should pop other blister.   HX of DM  Discussed wound care.

## 2017-11-13 NOTE — PROGRESS NOTES
"       Chief Complaint: Left thumb burn from hot pot.    HPI:  Natalie presents to the clinic for concern about burn to left thumb.    Her PMH includes    Situtional Stress  Asthma  HTN  Hyperlipidemia  Obesity  Vitamin D Deficiency  DM-2  Right Knee DJD  Splenectomy  Tonsillectomy    Cholecystectomy  Chronic Hoarse Throat  FIT Stool TEST positive (2017 and 10/2017)     Established with    Orthopedics- Dr Ospina ([facility])     Previous Referrals Approved:  Psychiatry- DR Jin and assoc  [id number]        Review of Records show  17 Clinic for results of 2nd pos fit test, To see GI on 17 for consult, Right Leg throbbing, Pt asking for trial of Gabapentin.  Referral to physical therapy, Continue Glipizide BID for DM, LAbs ordered for near future r/t DM  10/30/17 Telephone Encounter Pt calls asking for RX for Gabapentin r/t leg \"throbbing\"---> RX sent w instructions to start slowly.  10/16/17 Telephone Encounter- Pos 2nd FIT Test. Pt to call GI for appt for colonoscopy  10/4/17 Clinic Visit for REsults of Pos FIT test, Pt not wanting to go to GI and wants repeat Fit Test. Med Refills  17 FIT Stool Test Positive---> Referred to GI MD (DR Zuniga)  17 Clinic visit for right leg pain/injury, RX for Norco and instructed to f/u w her Orthopedic MD, Dr Ospina  3/30/17 Clinic visit for Recheck of BP, Alergie, nebulizer Rx and New Glucometer.  3/1/17 Orthopedic Appt- DR Cowart- Bilat knee injections completed.  17 Clinic visit for adjustment of BP medication---> Increase Valsartan dose/HTN, Adding Glipizide PO as not tolerating Metformin.  16 Clinic visit for anxiety/stress due to family death. Referred to Psychiatry and Psychology(Counseling), RX Xanax and Atarax.  16 Bilat Knee injections by Dr Sofya RICO MD  16 Bilat Knee Injections by JACKY SAMANO, Dr Cowart for DJD to knees.  16 Clinic visit for elevated BP, Bleeding Mole concern, Referred " to Dermatology ([place] Provider)  7/7/16 Last Clinic Visit for chronic hoarse throat, ACE Inhibitor stopped and Pt started on Valsartan, Referral to Allergist and ENT     [place]  Report  8/22/17 Norco 5/325 # 21 by me  12/13/16 Xanax 0.25 # 20 prescribed by me  -----------------------------------------------------------      Burn, thumb, first degree, left, initial encounter  Pt reports popping popcorn in pot on stove.  Burn to left thumb base Saturday. It was red but this am one of the two blisters popped.  Pt reports is mildly tender and wonders if we should pop other blister.   HX of DM  Discussed wound care and signs of infection and if swelling, redness or purulent drainage or fever/chills  Or feeling ill return or go to ER.      Patient Active Problem List    Diagnosis Date Noted   • Burn, thumb, first degree, left, initial encounter 11/13/2017   • Right calf pain 11/01/2017   • Fecal occult blood test positive 10/04/2017   • Injury of right lower extremity 08/22/2017   • Environmental allergies 03/30/2017   • Morbid obesity with BMI of 45.0-49.9, adult ([facility]) 02/16/2017   • Mood changes (CMS-HCC) 12/13/2016   • Hoarseness, persistent 07/07/2016   • Insomnia 07/07/2016   • Hyperlipidemia 06/09/2016   • Osteoarthritis of knee 04/14/2016   • Vision problems 03/24/2016   • Right knee pain 03/24/2016   • Vitamin D deficiency 11/12/2015   • Type 2 diabetes mellitus without complication (CMS-HCC) 11/12/2015   • Essential hypertension 10/22/2015   • Moderate persistent asthma 10/22/2015   • Episodic headache 10/22/2015   • H/O splenectomy 10/22/2015       Allergies:Voltaren [diclofenac-disod edta]    Current Outpatient Prescriptions   Medication Sig Dispense Refill   • silver sulfADIAZINE (SILVADENE) 1 % Cream Apply small amt to left thumb skin burn/blisters once a day. 1 Each 0   • gabapentin (NEURONTIN) 100 MG Cap Take 1 Cap by mouth 3 times a day. 90 Cap 1   • carbamazepine (TEGRETOL) 200 MG Tab TAKE 1 TABLET  ORALLY EVERY MORNING AND 2 TABLETS BY MOUTH EVERY EVENING 90 Tab 3   • glipiZIDE (GLUCOTROL) 5 MG Tab Take 1 Tab by mouth 2 Times a Day. 60 Tab 0   • ipratropium (ATROVENT) 17 MCG/ACT Aero Soln Inhale 2 Puffs by mouth every 6 hours. 17 g 5   • albuterol 108 (90 Base) MCG/ACT Aero Soln inhalation aerosol Inhale 2 Puffs by mouth every 6 hours as needed for Shortness of Breath. 8.5 g 5   • valsartan (DIOVAN) 160 MG Tab Take 1 Tab by mouth every day. 30 Tab 5   • simvastatin (ZOCOR) 40 MG Tab Take 1 Tab by mouth every evening. 30 Tab 4   • Cholecalciferol 4000 units Cap Take 1 Capsule by mouth every day. 30 Cap 5   • hydrOXYzine (ATARAX) 25 MG Tab TAKE 1 TABLET ORALLY NIGHTLY AT BEDTIME IF NEEDED FOR ANXIETY/INSOMNIA 30 Tab 2   • hydrocodone-acetaminophen (NORCO) 5-325 MG Tab per tablet Take 1 Tab by mouth every 8 hours as needed. 21 Tab 0   • montelukast (SINGULAIR) 10 MG Tab TAKE 1 TABLET ORALLY ONCE DAILY 30 Tab 4   • amlodipine (NORVASC) 10 MG Tab TAKE 1 TABLET ORALLY ONCE DAILY 30 Tab 5   • glucose blood (TRUE METRIX BLOOD GLUCOSE TEST) strip 1 Strip by Other route as needed. 50 Strip 2   • Blood Glucose Monitoring Suppl (TRUE METRIX AIR GLUCOSE METER) Device 1 Each by Does not apply route every day. 1 Device 0   • albuterol (PROVENTIL) 2.5mg/3ml Nebu Soln solution for nebulization 3 mL by Nebulization route every four hours as needed for Shortness of Breath. 75 mL 3   • TECHLITE LANCETS Misc      • TRUE METRIX BLOOD GLUCOSE TEST strip FINGER STICK BLOOD SUGAR ONCE DAILY FASTING AND IF SYMPTOMATIC 50 Strip 11   • LUMIGAN 0.01 % Solution 1 Drop every bedtime.     • vitamin D, Ergocalciferol, (DRISDOL) 49899 UNITS Cap capsule TAKE 1 CAPSULE ORALLY ONCE EVERY SEVEN (7) DAYS 4 Cap 3   • meclizine (ANTIVERT) 25 MG Tab Take 1 Tab by mouth 2 times a day as needed. 30 Tab 1     No current facility-administered medications for this visit.        Social History   Substance Use Topics   • Smoking status: Former Smoker      "Years: 5.00   • Smokeless tobacco: Never Used   • Alcohol use No       Family History   Problem Relation Age of Onset   • Diabetes Mother    • Heart Disease Mother        ROS:  Review of Systems   See HPI Above      Exam:  Vitals:    11/13/17 1455   BP: 136/86   Pulse: 96   Resp: 16   Temp: 36.2 °C (97.2 °F)   SpO2: 93%   Weight: 106.6 kg (235 lb)   Height: 1.549 m (5' 0.98\")     General:  Well nourished, over-weight,well developed female in NAD  HENT:Head is grossly normal. PERRL.  Neck: Supple. Trachea is midline.  Pulmonary: Clear to ausculation .  Normal effort. No rales, ronchi, or wheezing.   Cardiovascular: Regular rate and rhythm.  Abdomen-Abdomen is soft, No tenderness.  Upper extremities- Strong = . Good ROM. Left thumb base with one intact small blister, one small open blister. Slight redness, no   Tenderness or drainage. No red streaks or unusual warmth felt. GOOD ROM and sensation to thumb.  Lower extremities- neg for edema, redness, tenderness.  Neuro- A & O x 4. Speech clear and appropriate.     Current medications, allergies, and problem list reviewed with patient and updated in  [facility] today.    Assessment/Plan:  1. Burn, thumb, first degree, left, initial encounter  silver sulfADIAZINE (SILVADENE) 1 % Cream RX  Thumb cleansed, polysporin applied and small non adherant non stick gauze applied and small wrap of 4\" coban to hold it . Tolerated well.   Wound care and instructions regarding s/s of infection reviewed mily Estrada.   Follow up as needed. Call or return if questions, concerns, or worsening condition.  "

## 2017-11-22 ENCOUNTER — PHYSICAL THERAPY (OUTPATIENT)
Dept: PHYSICAL THERAPY | Facility: REHABILITATION | Age: 57
End: 2017-11-22
Attending: NURSE PRACTITIONER
Payer: MEDICAID

## 2017-11-22 DIAGNOSIS — M79.661 RIGHT CALF PAIN: ICD-10-CM

## 2017-11-22 PROCEDURE — 97110 THERAPEUTIC EXERCISES: CPT

## 2017-11-22 NOTE — OP THERAPY DAILY TREATMENT
Outpatient Physical Therapy  DAILY TREATMENT     [facility] [address]  [address]  [place] [address]  Phone:  [phone]  Fax:  [phone]    Date: 11/22/2017    Patient: Natalie Eugene  YOB: 1960  MRN: [id number]     Time Calculation  Start time: 1229  Stop time: 1305 Time Calculation (min): 36 minutes     Chief Complaint: Difficulty Walking    Visit #: 2    SUBJECTIVE:  Feeling the same, but symptoms not present today.  Meds aren't helping.  Continued R lateral calf/ankle pain burning in quality.     OBJECTIVE:    Therapeutic Exercises (CPT 64623):     Total treatment time spent on Therapeutic Exercises: 30 minutes.      1. NuStep, L5 x 7 min    2. Common peroneal nerve glide using ball roll and ankle IV, x 50    3. Ball bridge, x 20    4. Demo of seated neural glide option, 2 min    5. Shuttle, Bilat squat 7C x 50, HR 7C x 50        Pain rating before treatment: 0  Pain rating after treatment: 0    ASSESSMENT:   Response to treatment: Unable to reproduced symptoms today. To trial neural glides when symptoms are present and report back.      PLAN/RECOMMENDATIONS:   Plan for treatment: therapy treatment to continue next visit.  Planned interventions for next visit: continue with current treatment.

## 2017-11-27 ENCOUNTER — HOSPITAL ENCOUNTER (OUTPATIENT)
Dept: LAB | Facility: MEDICAL CENTER | Age: 57
End: 2017-11-27
Attending: NURSE PRACTITIONER
Payer: MEDICAID

## 2017-11-27 DIAGNOSIS — E78.49 OTHER HYPERLIPIDEMIA: ICD-10-CM

## 2017-11-27 DIAGNOSIS — Z13.29 SCREENING FOR THYROID DISORDER: ICD-10-CM

## 2017-11-27 DIAGNOSIS — E55.9 VITAMIN D DEFICIENCY: ICD-10-CM

## 2017-11-27 DIAGNOSIS — E11.9 TYPE 2 DIABETES MELLITUS WITHOUT COMPLICATION, WITHOUT LONG-TERM CURRENT USE OF INSULIN (HCC): ICD-10-CM

## 2017-11-27 LAB
25(OH)D3 SERPL-MCNC: 28 NG/ML (ref 30–100)
ALBUMIN SERPL BCP-MCNC: 4.3 G/DL (ref 3.2–4.9)
ALBUMIN/GLOB SERPL: 1.6 G/DL
ALP SERPL-CCNC: 94 U/L (ref 30–99)
ALT SERPL-CCNC: 19 U/L (ref 2–50)
ANION GAP SERPL CALC-SCNC: 6 MMOL/L (ref 0–11.9)
AST SERPL-CCNC: 21 U/L (ref 12–45)
BASOPHILS # BLD AUTO: 1 % (ref 0–1.8)
BASOPHILS # BLD: 0.08 K/UL (ref 0–0.12)
BILIRUB SERPL-MCNC: 0.6 MG/DL (ref 0.1–1.5)
BUN SERPL-MCNC: 14 MG/DL (ref 8–22)
CALCIUM SERPL-MCNC: 9 MG/DL (ref 8.5–10.5)
CHLORIDE SERPL-SCNC: 103 MMOL/L (ref 96–112)
CHOLEST SERPL-MCNC: 250 MG/DL (ref 100–199)
CO2 SERPL-SCNC: 32 MMOL/L (ref 20–33)
CREAT SERPL-MCNC: 0.63 MG/DL (ref 0.5–1.4)
CREAT UR-MCNC: 202.6 MG/DL
EOSINOPHIL # BLD AUTO: 0.25 K/UL (ref 0–0.51)
EOSINOPHIL NFR BLD: 3 % (ref 0–6.9)
ERYTHROCYTE [DISTWIDTH] IN BLOOD BY AUTOMATED COUNT: 41.1 FL (ref 35.9–50)
EST. AVERAGE GLUCOSE BLD GHB EST-MCNC: 128 MG/DL
GFR SERPL CREATININE-BSD FRML MDRD: >60 ML/MIN/1.73 M 2
GLOBULIN SER CALC-MCNC: 2.7 G/DL (ref 1.9–3.5)
GLUCOSE SERPL-MCNC: 127 MG/DL (ref 65–99)
HBA1C MFR BLD: 6.1 % (ref 0–5.6)
HCT VFR BLD AUTO: 46.6 % (ref 37–47)
HDLC SERPL-MCNC: 56 MG/DL
HGB BLD-MCNC: 16 G/DL (ref 12–16)
IMM GRANULOCYTES # BLD AUTO: 0.03 K/UL (ref 0–0.11)
IMM GRANULOCYTES NFR BLD AUTO: 0.4 % (ref 0–0.9)
LDLC SERPL CALC-MCNC: 162 MG/DL
LYMPHOCYTES # BLD AUTO: 2.13 K/UL (ref 1–4.8)
LYMPHOCYTES NFR BLD: 25.3 % (ref 22–41)
MCH RBC QN AUTO: 31.4 PG (ref 27–33)
MCHC RBC AUTO-ENTMCNC: 34.3 G/DL (ref 33.6–35)
MCV RBC AUTO: 91.6 FL (ref 81.4–97.8)
MICROALBUMIN UR-MCNC: 1.2 MG/DL
MICROALBUMIN/CREAT UR: 6 MG/G (ref 0–30)
MONOCYTES # BLD AUTO: 0.75 K/UL (ref 0–0.85)
MONOCYTES NFR BLD AUTO: 8.9 % (ref 0–13.4)
NEUTROPHILS # BLD AUTO: 5.17 K/UL (ref 2–7.15)
NEUTROPHILS NFR BLD: 61.4 % (ref 44–72)
NRBC # BLD AUTO: 0 K/UL
NRBC BLD AUTO-RTO: 0 /100 WBC
PLATELET # BLD AUTO: 455 K/UL (ref 164–446)
PMV BLD AUTO: 9.3 FL (ref 9–12.9)
POTASSIUM SERPL-SCNC: 3.8 MMOL/L (ref 3.6–5.5)
PROT SERPL-MCNC: 7 G/DL (ref 6–8.2)
RBC # BLD AUTO: 5.09 M/UL (ref 4.2–5.4)
SODIUM SERPL-SCNC: 141 MMOL/L (ref 135–145)
TRIGL SERPL-MCNC: 158 MG/DL (ref 0–149)
TSH SERPL DL<=0.005 MIU/L-ACNC: 2.78 UIU/ML (ref 0.3–3.7)
VIT B12 SERPL-MCNC: 292 PG/ML (ref 211–911)
WBC # BLD AUTO: 8.4 K/UL (ref 4.8–10.8)

## 2017-11-27 PROCEDURE — 83036 HEMOGLOBIN GLYCOSYLATED A1C: CPT

## 2017-11-27 PROCEDURE — 82043 UR ALBUMIN QUANTITATIVE: CPT

## 2017-11-27 PROCEDURE — 82607 VITAMIN B-12: CPT

## 2017-11-27 PROCEDURE — 82306 VITAMIN D 25 HYDROXY: CPT

## 2017-11-27 PROCEDURE — 85025 COMPLETE CBC W/AUTO DIFF WBC: CPT

## 2017-11-27 PROCEDURE — 80061 LIPID PANEL: CPT

## 2017-11-27 PROCEDURE — 36415 COLL VENOUS BLD VENIPUNCTURE: CPT

## 2017-11-27 PROCEDURE — 84443 ASSAY THYROID STIM HORMONE: CPT

## 2017-11-27 PROCEDURE — 82570 ASSAY OF URINE CREATININE: CPT

## 2017-11-27 PROCEDURE — 80053 COMPREHEN METABOLIC PANEL: CPT

## 2017-11-29 ENCOUNTER — OFFICE VISIT (OUTPATIENT)
Dept: MEDICAL GROUP | Facility: MEDICAL CENTER | Age: 57
End: 2017-11-29
Attending: NURSE PRACTITIONER
Payer: MEDICAID

## 2017-11-29 VITALS
DIASTOLIC BLOOD PRESSURE: 80 MMHG | BODY MASS INDEX: 42.51 KG/M2 | SYSTOLIC BLOOD PRESSURE: 116 MMHG | HEIGHT: 62 IN | RESPIRATION RATE: 20 BRPM | HEART RATE: 86 BPM | OXYGEN SATURATION: 91 % | TEMPERATURE: 96.9 F | WEIGHT: 231 LBS

## 2017-11-29 DIAGNOSIS — E55.9 VITAMIN D DEFICIENCY: ICD-10-CM

## 2017-11-29 DIAGNOSIS — M79.661 RIGHT CALF PAIN: ICD-10-CM

## 2017-11-29 DIAGNOSIS — E78.49 OTHER HYPERLIPIDEMIA: ICD-10-CM

## 2017-11-29 DIAGNOSIS — Z13.29 SCREENING FOR THYROID DISORDER: ICD-10-CM

## 2017-11-29 DIAGNOSIS — E11.9 TYPE 2 DIABETES MELLITUS WITHOUT COMPLICATION, WITHOUT LONG-TERM CURRENT USE OF INSULIN (HCC): ICD-10-CM

## 2017-11-29 DIAGNOSIS — N39.3 STRESS INCONTINENCE OF URINE: ICD-10-CM

## 2017-11-29 PROCEDURE — 99213 OFFICE O/P EST LOW 20 MIN: CPT | Performed by: NURSE PRACTITIONER

## 2017-11-29 PROCEDURE — 99214 OFFICE O/P EST MOD 30 MIN: CPT | Performed by: NURSE PRACTITIONER

## 2017-11-29 RX ORDER — MONTELUKAST SODIUM 10 MG/1
TABLET ORAL
Qty: 30 TAB | Refills: 2 | Status: SHIPPED | OUTPATIENT
Start: 2017-11-29 | End: 2018-12-24

## 2017-11-29 RX ORDER — ESTRADIOL 10 UG/1
10 INSERT VAGINAL
Qty: 8 TAB | Refills: 2 | Status: SHIPPED | OUTPATIENT
Start: 2017-11-29

## 2017-11-29 RX ORDER — SIMVASTATIN 80 MG
80 TABLET ORAL
Qty: 30 TAB | Refills: 11 | Status: SHIPPED | OUTPATIENT
Start: 2017-11-29 | End: 2017-11-29

## 2017-11-29 RX ORDER — ATORVASTATIN CALCIUM 40 MG/1
40 TABLET, FILM COATED ORAL DAILY
Qty: 30 TAB | Refills: 2 | Status: SHIPPED | OUTPATIENT
Start: 2017-11-29 | End: 2018-02-20 | Stop reason: SDUPTHER

## 2017-11-29 RX ORDER — GLIPIZIDE 5 MG/1
TABLET ORAL
Qty: 60 TAB | Refills: 2 | Status: SHIPPED | OUTPATIENT
Start: 2017-11-29 | End: 2018-03-19 | Stop reason: SDUPTHER

## 2017-11-29 ASSESSMENT — PAIN SCALES - GENERAL: PAINLEVEL: 6=MODERATE PAIN

## 2017-11-29 NOTE — TELEPHONE ENCOUNTER
Was the patient seen in the last year in this department? Yes     Does patient have an active prescription for medications requested? No     Received Request Via: Pharmacy   Future Appointments       Provider Department [place]    11/30/2017 4:30 PM Kesha Gracia PT, DPT [facility] [address] [place] [address]    12/5/2017 10:30 AM Kesha Gracia PT, DPT [facility] [address] [place] [address]    12/7/2017 1:00 PM Kesha Gracia PT, DPT [facility] [address] [place] [address]    12/12/2017 10:00 AM Kesha Gracia PT, DPT [facility] [address] [place] [address]    12/14/2017 1:00 PM Kesha Gracia PT, DPT [facility] [address] [place] [address]    [facility]2017 [facility] [address] [place] [address]    12/19/2017 10:00 AM Kesha Gracia PT, DPT [facility] [address] [place] [address]    12/21/2017 1:00 PM Kesha Gracia PT, DPT [facility] [address] [place] [address]

## 2017-11-29 NOTE — ASSESSMENT & PLAN NOTE
Pt reports going to PT and is doing exercises at home.  Less calf pain.  Is taking muscle relaxant and helping.  Not yet tried Gabapentin

## 2017-11-29 NOTE — ASSESSMENT & PLAN NOTE
Pt recent labs show A!C= 6.1 abd fasting BS= 127  Is continuing to take Glipizide BID  I have asked her to drastically reduce her sugar and carb intake and  Increase exercise.

## 2017-11-29 NOTE — PROGRESS NOTES
"    Chief Complaint: f/u on calf pain, results of labs    HPI:  Natalie presents to the clinic for f/u on calf/leg pain.  Her PMH includes     Situational Stress  Asthma  HTN  Hyperlipidemia  Obesity  Vitamin D Deficiency  DM-2  Right Knee DJD  Splenectomy  Tonsillectomy    Cholecystectomy  Chronic Hoarse Throat  FIT Stool TEST positive (2017 and 10/2017)  Left calf strain     Established with    Orthopedics- Dr Ospina ([facility])     Previous Referrals Approved:  Psychiatry- DR Jin and assoc  [id number]        Review of Records show  17 [facility] appt  17 Clinic Left Thumb burn visit  17 Clinic for results of 2nd pos fit test, To see GI on 17 for consult, Right Leg throbbing, Pt asking for trial of Gabapentin.  Referral to physical therapy, Continue Glipizide BID for DM, LAbs ordered for near future r/t DM  10/30/17 Telephone Encounter Pt calls asking for RX for Gabapentin r/t leg \"throbbing\"---> RX sent w instructions to start slowly.    10/16/17 Telephone Encounter- Pos 2nd FIT Test. Pt to call GI for appt for colonoscopy  10/4/17 Clinic Visit for REsults of Pos FIT test, Pt not wanting to go to GI and wants repeat Fit Test. Med Refills  17 FIT Stool Test Positive---> Referred to GI MD (DR Zuniga)  17 Clinic visit for right leg pain/injury, RX for Norco and instructed to f/u w her Orthopedic MD, Dr Ospina  3/30/17 Clinic visit for Recheck of BP, Alergie, nebulizer Rx and New Glucometer.  3/1/17 Orthopedic Appt- DR Cowart- Bilat knee injections completed.  17 Clinic visit for adjustment of BP medication---> Increase Valsartan dose/HTN, Adding Glipizide PO as not tolerating Metformin.  16 Clinic visit for anxiety/stress due to family death. Referred to Psychiatry and Psychology(Counseling), RX Xanax and Atarax.  16 Bilat Knee injections by Dr Sofya RICO MD  16 Bilat Knee Injections by JACKY SAMANO, Dr Cowart " for DJD to knees.  8/1/16 Clinic visit for elevated BP, Bleeding Mole concern, Referred to Dermatology ([place] Provider)  7/7/16 Last Clinic Visit for chronic hoarse throat, ACE Inhibitor stopped and Pt started on Valsartan, Referral to Allergist and ENT     [place]  Report  8/22/17 Norco 5/325 # 21 by me  12/13/16 Xanax 0.25 # 20 prescribed by me  -----------------------------------------------------------      Hyperlipidemia  Pt labs show high cholesterol  She reports she has been taking Simvastatin 40 mg daily.  Discussed need to increase dose and pt to work on lowering  Sugar/carbs and Saturated fats in diet and read labels.  Will recheck Lipid and CMP in ~ 3 months.    Type 2 diabetes mellitus without complication  Pt recent labs show A1C= 6.1 abd fasting BS= 127  Is continuing to take Glipizide BID. This is an improvement.  I have asked her to drastically reduce her sugar and carb intake and  Increase exercise.  Pt is over weight and we discussed the importance of losing mid abd wt.    Vitamin D deficiency  Latest labs show Vit D 28  Pt is taking supplement daily of 4,000 units  Only since October.  Will have her continue and recheck level in 2-3 months..      Right calf pain  Pt reports going to PT and is doing exercises at home.  Less calf pain. Has f/u appt this week w PT.  Is taking muscle relaxant and helping.  Not yet tried Gabapentin      Stress incontinence of urine  Pt reports stress urinary incontinence   Ongoing for a year. States if coughs or sneezes or laughs   Had dribble of urine. Denies vaginal discharge nor dysuria  I recommended referral to Urology but Pt not ready to go.  Will trial Vagifem and if not helpful will re-address referral  Pt not wanting to go to PT for bladder training.      Patient Active Problem List    Diagnosis Date Noted   • Stress incontinence of urine 11/29/2017   • Burn, thumb, first degree, left, initial encounter 11/13/2017   • Right calf pain 11/01/2017   • Fecal  occult blood test positive 10/04/2017   • Injury of right lower extremity 08/22/2017   • Environmental allergies 03/30/2017   • Morbid obesity with BMI of 45.0-49.9, adult ([facility]) 02/16/2017   • Mood changes (CMS-HCC) 12/13/2016   • Hoarseness, persistent 07/07/2016   • Insomnia 07/07/2016   • Hyperlipidemia 06/09/2016   • Osteoarthritis of knee 04/14/2016   • Vision problems 03/24/2016   • Right knee pain 03/24/2016   • Vitamin D deficiency 11/12/2015   • Type 2 diabetes mellitus without complication (CMS-HCC) 11/12/2015   • Essential hypertension 10/22/2015   • Moderate persistent asthma 10/22/2015   • Episodic headache 10/22/2015   • H/O splenectomy 10/22/2015       Allergies:Voltaren [diclofenac-disod edta]    Current Outpatient Prescriptions   Medication Sig Dispense Refill   • simvastatin (ZOCOR) 80 MG tablet Take 1 Tab by mouth every bedtime. 30 Tab 11   • estradiol (VAGIFEM) 10 MCG Tab Insert 1 Tab in vagina every 3 days. 8 Tab 2   • silver sulfADIAZINE (SILVADENE) 1 % Cream Apply small amt to left thumb skin burn/blisters once a day. 1 Each 0   • gabapentin (NEURONTIN) 100 MG Cap Take 1 Cap by mouth 3 times a day. 90 Cap 1   • carbamazepine (TEGRETOL) 200 MG Tab TAKE 1 TABLET ORALLY EVERY MORNING AND 2 TABLETS BY MOUTH EVERY EVENING 90 Tab 3   • glipiZIDE (GLUCOTROL) 5 MG Tab Take 1 Tab by mouth 2 Times a Day. 60 Tab 0   • ipratropium (ATROVENT) 17 MCG/ACT Aero Soln Inhale 2 Puffs by mouth every 6 hours. 17 g 5   • albuterol 108 (90 Base) MCG/ACT Aero Soln inhalation aerosol Inhale 2 Puffs by mouth every 6 hours as needed for Shortness of Breath. 8.5 g 5   • valsartan (DIOVAN) 160 MG Tab Take 1 Tab by mouth every day. 30 Tab 5   • Cholecalciferol 4000 units Cap Take 1 Capsule by mouth every day. 30 Cap 5   • hydrOXYzine (ATARAX) 25 MG Tab TAKE 1 TABLET ORALLY NIGHTLY AT BEDTIME IF NEEDED FOR ANXIETY/INSOMNIA 30 Tab 2   • hydrocodone-acetaminophen (NORCO) 5-325 MG Tab per tablet Take 1 Tab by mouth every 8  "hours as needed. 21 Tab 0   • montelukast (SINGULAIR) 10 MG Tab TAKE 1 TABLET ORALLY ONCE DAILY 30 Tab 4   • amlodipine (NORVASC) 10 MG Tab TAKE 1 TABLET ORALLY ONCE DAILY 30 Tab 5   • glucose blood (TRUE METRIX BLOOD GLUCOSE TEST) strip 1 Strip by Other route as needed. 50 Strip 2   • Blood Glucose Monitoring Suppl (TRUE METRIX AIR GLUCOSE METER) Device 1 Each by Does not apply route every day. 1 Device 0   • albuterol (PROVENTIL) 2.5mg/3ml Nebu Soln solution for nebulization 3 mL by Nebulization route every four hours as needed for Shortness of Breath. 75 mL 3   • TECHLITE LANCETS Misc      • TRUE METRIX BLOOD GLUCOSE TEST strip FINGER STICK BLOOD SUGAR ONCE DAILY FASTING AND IF SYMPTOMATIC 50 Strip 11   • LUMIGAN 0.01 % Solution 1 Drop every bedtime.     • meclizine (ANTIVERT) 25 MG Tab Take 1 Tab by mouth 2 times a day as needed. 30 Tab 1     No current facility-administered medications for this visit.        Social History   Substance Use Topics   • Smoking status: Former Smoker     Years: 5.00   • Smokeless tobacco: Never Used   • Alcohol use No       Family History   Problem Relation Age of Onset   • Diabetes Mother    • Heart Disease Mother        ROS:  Review of Systems   See HPI Above        Exam:  Blood pressure 116/80, pulse 86, temperature 36.1 °C (96.9 °F), resp. rate 20, height 1.562 m (5' 1.5\"), weight 104.8 kg (231 lb), last menstrual period 11/29/2009, SpO2 91 %, not currently breastfeeding.  General:  Well nourished, over-weight, well developed female in mild distress.  HENT:Head is grossly normal. PERRL.  Neck: Supple. Trachea is midline.  Pulmonary: Clear to ausculation .  Normal effort. No rales, ronchi, or wheezing.   Cardiovascular: Regular rate and rhythm.  Abdomen-Abdomen is soft, No tenderness.  Upper extremities-  Good ROM  Lower extremities- neg for edema, redness, tenderness.  Neuro- A & O x 4. Speech clear and appropriate.     Current medications, allergies, and problem list reviewed " with patient and updated in  [facility] today.    Assessment/Plan:  1. Other hyperlipidemia  simvastatin (ZOCOR) 80 MG tablet- INcrease  Simvastatin 80 mg denied by her insurance Rx for Atorvastatin 40 mg sent in.    COMP METABOLIC PANEL in 3 months    LIPID PROFILE in 3 months   2. Type 2 diabetes mellitus without complication, without long-term current use of insulin (CMS-[facility])  COMP METABOLIC PANEL I n3 months   3. Vitamin D deficiency  VITAMIN D,25 HYDROXY in 3 months   4. Right calf pain  Continue exercises and physical therapy appt's.  Continue muscle relaxant.   5. Screening for thyroid disorder  TSH in 3 months   6. Stress incontinence of urine  estradiol (VAGIFEM) 10 MCG Tab twice weekly via vaginal insertion.  If no relief re-visit referral to Urology.     Follow up in 3 months. Call or return if questions, concerns, or worsening condition.

## 2017-11-30 ENCOUNTER — PHYSICAL THERAPY (OUTPATIENT)
Dept: PHYSICAL THERAPY | Facility: REHABILITATION | Age: 57
End: 2017-11-30
Attending: NURSE PRACTITIONER
Payer: MEDICAID

## 2017-11-30 DIAGNOSIS — M79.661 RIGHT CALF PAIN: ICD-10-CM

## 2017-11-30 PROCEDURE — 97110 THERAPEUTIC EXERCISES: CPT

## 2017-12-01 NOTE — OP THERAPY DAILY TREATMENT
Outpatient Physical Therapy  DAILY TREATMENT     [facility] Therapy [address].  [address]  [place] [address]  Phone:  [phone]  Fax:  [phone]    Date: 11/30/2017    Patient: Natalie Eugene  YOB: 1960  MRN: [id number]     Time Calculation  Start time: 1601  Stop time: 1631 Time Calculation (min): 30 minutes     Chief Complaint: Knee Problem and Ankle Problem    Visit #: 3    SUBJECTIVE:  Pain still intermittent.  Unable to ID agg or time of day.  Neural glides are reported to help    OBJECTIVE:      Therapeutic Exercises (CPT 17733):     1. NuStep L3, x 7 min    2. Ball rolls, x 30    3. Ball bridge, x 30    4. TB ankle eversion, L1 x 30 ea    5. TB ankle PF, L1 x 30    6. Ball squats 1/2 depth, x 20    7. Airex balance sharp with EC, x 2 min trial      Pain rating before treatment: 4- discomfort on the lateral R lower leg  Pain rating after treatment: 4    ASSESSMENT:   Response to treatment: Sx cont to be consistent with R common peroneal pathology.  Tx well tolerated and able to progress to WBing tasks today without increased pain.     PLAN/RECOMMENDATIONS:   Plan for treatment: therapy treatment to continue next visit.  Planned interventions for next visit: continue with current treatment.

## 2017-12-05 ENCOUNTER — APPOINTMENT (OUTPATIENT)
Dept: PHYSICAL THERAPY | Facility: REHABILITATION | Age: 57
End: 2017-12-05
Attending: NURSE PRACTITIONER
Payer: MEDICAID

## 2017-12-12 ENCOUNTER — PHYSICAL THERAPY (OUTPATIENT)
Dept: PHYSICAL THERAPY | Facility: REHABILITATION | Age: 57
End: 2017-12-12
Attending: NURSE PRACTITIONER
Payer: MEDICAID

## 2017-12-12 DIAGNOSIS — M79.661 RIGHT CALF PAIN: ICD-10-CM

## 2017-12-12 PROCEDURE — 97530 THERAPEUTIC ACTIVITIES: CPT

## 2017-12-12 PROCEDURE — 97110 THERAPEUTIC EXERCISES: CPT

## 2017-12-14 ENCOUNTER — APPOINTMENT (OUTPATIENT)
Dept: PHYSICAL THERAPY | Facility: REHABILITATION | Age: 57
End: 2017-12-14
Attending: NURSE PRACTITIONER
Payer: MEDICAID

## 2017-12-14 RX ORDER — MONTELUKAST SODIUM 10 MG/1
TABLET ORAL
Qty: 30 TAB | Refills: 3 | Status: SHIPPED | OUTPATIENT
Start: 2017-12-14 | End: 2018-05-16 | Stop reason: SDUPTHER

## 2017-12-15 NOTE — TELEPHONE ENCOUNTER
Was the patient seen in the last year in this department? Yes     Does patient have an active prescription for medications requested? No     Received Request Via: Pharmacy   Future Appointments       Provider Department [place]    12/19/2017 3:00 PM Kesha Gracia, PT, DPT [facility] [address] [place] [address]    12/21/2017 4:00 PM Kesha Gracia PT, DPT [facility] [address] [place] [address]    12/26/2017 3:00 PM Kesha Gracia PT, DPT [facility] [address] [place] [address]    12/28/2017 3:50 PM [facility] [address] [place] [address]    12/28/2017 4:30 PM Kesha Gracia PT, DPT [facility] [address]

## 2017-12-19 ENCOUNTER — APPOINTMENT (OUTPATIENT)
Dept: PHYSICAL THERAPY | Facility: REHABILITATION | Age: 57
End: 2017-12-19
Attending: NURSE PRACTITIONER
Payer: MEDICAID

## 2017-12-19 ENCOUNTER — OFFICE VISIT (OUTPATIENT)
Dept: MEDICAL GROUP | Facility: MEDICAL CENTER | Age: 57
End: 2017-12-19
Attending: NURSE PRACTITIONER
Payer: MEDICAID

## 2017-12-19 VITALS
WEIGHT: 230 LBS | HEART RATE: 107 BPM | RESPIRATION RATE: 20 BRPM | HEIGHT: 61 IN | OXYGEN SATURATION: 91 % | TEMPERATURE: 97.4 F | DIASTOLIC BLOOD PRESSURE: 78 MMHG | SYSTOLIC BLOOD PRESSURE: 144 MMHG | BODY MASS INDEX: 43.43 KG/M2

## 2017-12-19 DIAGNOSIS — J45.41 MODERATE PERSISTENT ASTHMA WITH ACUTE EXACERBATION: ICD-10-CM

## 2017-12-19 DIAGNOSIS — J45.30 MILD PERSISTENT ASTHMA, UNCOMPLICATED: ICD-10-CM

## 2017-12-19 DIAGNOSIS — R05.9 COUGH: ICD-10-CM

## 2017-12-19 DIAGNOSIS — R06.2 WHEEZING: ICD-10-CM

## 2017-12-19 DIAGNOSIS — J45.41 MODERATE PERSISTENT ASTHMATIC BRONCHITIS WITH ACUTE EXACERBATION: ICD-10-CM

## 2017-12-19 PROCEDURE — 99214 OFFICE O/P EST MOD 30 MIN: CPT | Performed by: NURSE PRACTITIONER

## 2017-12-19 PROCEDURE — 99212 OFFICE O/P EST SF 10 MIN: CPT | Performed by: NURSE PRACTITIONER

## 2017-12-19 RX ORDER — METHYLPREDNISOLONE 4 MG/1
4 TABLET ORAL DAILY
Qty: 1 KIT | Refills: 0 | Status: SHIPPED | OUTPATIENT
Start: 2017-12-19 | End: 2018-05-08

## 2017-12-19 RX ORDER — ALBUTEROL SULFATE 2.5 MG/3ML
2.5 SOLUTION RESPIRATORY (INHALATION) EVERY 4 HOURS PRN
Qty: 75 ML | Refills: 3 | Status: SHIPPED | OUTPATIENT
Start: 2017-12-19 | End: 2020-03-06

## 2017-12-19 RX ORDER — AZITHROMYCIN 250 MG/1
TABLET, FILM COATED ORAL
Qty: 6 TAB | Refills: 0 | Status: SHIPPED | OUTPATIENT
Start: 2017-12-19 | End: 2018-05-08

## 2017-12-19 RX ORDER — CODEINE PHOSPHATE/GUAIFENESIN 10-100MG/5
5 LIQUID (ML) ORAL 2 TIMES DAILY PRN
Qty: 120 ML | Refills: 0 | Status: SHIPPED | OUTPATIENT
Start: 2017-12-19 | End: 2018-01-02

## 2017-12-19 RX ORDER — BENZONATATE 100 MG/1
100 CAPSULE ORAL 3 TIMES DAILY PRN
Qty: 30 CAP | Refills: 0 | Status: SHIPPED | OUTPATIENT
Start: 2017-12-19 | End: 2018-05-08

## 2017-12-20 NOTE — PROGRESS NOTES
"Natalie presents to the clinic as same day appt for cough and nasal congestion.    Her PMH includes  Situational Stress  Asthma  HTN  Hyperlipidemia  Obesity  Vitamin D Deficiency  DM-2  Right Knee DJD  Splenectomy  Tonsillectomy    Cholecystectomy  Chronic Hoarse Throat  FIT Stool TEST positive (2017 and 10/2017)  Left calf strain     Established with    Orthopedics- Dr Ospina ([facility])     Previous Referrals Approved:  Psychiatry- DR Jin and assoc  [id number]        Review of Records show  17 Clinic visit for f/u on calf pain and for discomfort assox.  17 [facility] appt  17 Clinic Left Thumb burn visit  17 Clinic for results of 2nd pos fit test, To see GI on 17 for consult, Right Leg throbbing, Pt asking for trial of Gabapentin.  Referral to physical therapy, Continue Glipizide BID for DM, LAbs ordered for near future r/t DM  10/30/17 Telephone Encounter Pt calls asking for RX for Gabapentin r/t leg \"throbbing\"---> RX sent w instructions to start slowly.     10/16/17 Telephone Encounter- Pos 2nd FIT Test. Pt to call GI for appt for colonoscopy  10/4/17 Clinic Visit for REsults of Pos FIT test, Pt not wanting to go to GI and wants repeat Fit Test. Med Refills  17 FIT Stool Test Positive---> Referred to GI MD (DR Zuniga)  17 Clinic visit for right leg pain/injury, RX for Norco and instructed to f/u w her Orthopedic MD, Dr Ospina  3/30/17 Clinic visit for Recheck of BP, Alergie, nebulizer Rx and New Glucometer.  3/1/17 Orthopedic Appt- DR Cowart- Bilat knee injections completed.  17 Clinic visit for adjustment of BP medication---> Increase Valsartan dose/HTN, Adding Glipizide PO as not tolerating Metformin.  16 Clinic visit for anxiety/stress due to family death. Referred to Psychiatry and Psychology(Counseling), RX Xanax and Atarax.  16 Bilat Knee injections by JACKY MD, Dr Cowart  16 Bilat Knee " "Injections by JACKY SAMANO, Dr Cowart for DJD to knees.  8/1/16 Clinic visit for elevated BP, Bleeding Mole concern, Referred to Dermatology ([place] Provider)  7/7/16 Last Clinic Visit for chronic hoarse throat, ACE Inhibitor stopped and Pt started on Valsartan, Referral to Allergist and ENT     [place]  Report  8/22/17 Norco 5/325 # 21 by me  12/13/16 Xanax 0.25 # 20 prescribed by me    Chief Complaint:    HPI:      Cough/Wheezing/ Asthma Exacerbation  3 days ago dry cough onset. Then today  feels \"tight cough w wheezing\"/  Non productive cough. A little mucus that she had come up was \"clear\"  Can't sleep at night due to cough  States \"it is all Asthma\" Just got her own Nebulizer back from family as daughter had been using it.  States no longer has any Albuterol Solution for nebulizer and wants refill  Reports sob w exertion and have coughs too hard.  Is using Inhalers w little relief. Reports no fever or chills. No chest pain.  Former smoker.      Patient Active Problem List    Diagnosis Date Noted   • Cough 12/19/2017   • Stress incontinence of urine 11/29/2017   • Burn, thumb, first degree, left, initial encounter 11/13/2017   • Right calf pain 11/01/2017   • Fecal occult blood test positive 10/04/2017   • Injury of right lower extremity 08/22/2017   • Environmental allergies 03/30/2017   • Morbid obesity with BMI of 45.0-49.9, adult ([facility]) 02/16/2017   • Mood changes (CMS-HCC) 12/13/2016   • Hoarseness, persistent 07/07/2016   • Insomnia 07/07/2016   • Hyperlipidemia 06/09/2016   • Osteoarthritis of knee 04/14/2016   • Vision problems 03/24/2016   • Right knee pain 03/24/2016   • Vitamin D deficiency 11/12/2015   • Type 2 diabetes mellitus without complication (CMS-HCC) 11/12/2015   • Essential hypertension 10/22/2015   • Moderate persistent asthma 10/22/2015   • Episodic headache 10/22/2015   • H/O splenectomy 10/22/2015       Allergies:Voltaren [diclofenac-disod edta]    Current Outpatient Prescriptions "   Medication Sig Dispense Refill   • albuterol (PROVENTIL) 2.5mg/3ml Nebu Soln solution for nebulization 3 mL by Nebulization route every four hours as needed for Shortness of Breath. 75 mL 3   • MethylPREDNISolone (MEDROL DOSEPAK) 4 MG Tablet Therapy Pack Take 1 Tab by mouth every day. 1 Kit 0   • benzonatate (TESSALON) 100 MG Cap Take 1 Cap by mouth 3 times a day as needed. 30 Cap 0   • guaifenesin-codeine (TUSSI-ORGANIDIN NR) 100-10 MG/5ML syrup Take 5 mL by mouth 2 times a day as needed for up to 14 days. 120 mL 0   • azithromycin (ZITHROMAX) 250 MG Tab Z pack-Take 2 tabs day one and then 1 tab daily for a total of 5 days 6 Tab 0   • montelukast (SINGULAIR) 10 MG Tab TAKE 1 TABLET ORALLY ONCE DAILY 30 Tab 3   • estradiol (VAGIFEM) 10 MCG Tab Insert 1 Tab in vagina every 3 days. 8 Tab 2   • montelukast (SINGULAIR) 10 MG Tab TAKE 1 TABLET ORALLY ONCE DAILY 30 Tab 2   • glipiZIDE (GLUCOTROL) 5 MG Tab TAKE 1 TABLET ORALLY 2 TIMES DAILY 60 Tab 2   • atorvastatin (LIPITOR) 40 MG Tab Take 1 Tab by mouth every day. 30 Tab 2   • silver sulfADIAZINE (SILVADENE) 1 % Cream Apply small amt to left thumb skin burn/blisters once a day. 1 Each 0   • gabapentin (NEURONTIN) 100 MG Cap Take 1 Cap by mouth 3 times a day. 90 Cap 1   • carbamazepine (TEGRETOL) 200 MG Tab TAKE 1 TABLET ORALLY EVERY MORNING AND 2 TABLETS BY MOUTH EVERY EVENING 90 Tab 3   • ipratropium (ATROVENT) 17 MCG/ACT Aero Soln Inhale 2 Puffs by mouth every 6 hours. 17 g 5   • albuterol 108 (90 Base) MCG/ACT Aero Soln inhalation aerosol Inhale 2 Puffs by mouth every 6 hours as needed for Shortness of Breath. 8.5 g 5   • valsartan (DIOVAN) 160 MG Tab Take 1 Tab by mouth every day. 30 Tab 5   • Cholecalciferol 4000 units Cap Take 1 Capsule by mouth every day. 30 Cap 5   • hydrOXYzine (ATARAX) 25 MG Tab TAKE 1 TABLET ORALLY NIGHTLY AT BEDTIME IF NEEDED FOR ANXIETY/INSOMNIA 30 Tab 2   • hydrocodone-acetaminophen (NORCO) 5-325 MG Tab per tablet Take 1 Tab by mouth  "every 8 hours as needed. 21 Tab 0   • amlodipine (NORVASC) 10 MG Tab TAKE 1 TABLET ORALLY ONCE DAILY 30 Tab 5   • glucose blood (TRUE METRIX BLOOD GLUCOSE TEST) strip 1 Strip by Other route as needed. 50 Strip 2   • Blood Glucose Monitoring Suppl (TRUE METRIX AIR GLUCOSE METER) Device 1 Each by Does not apply route every day. 1 Device 0   • TECHLITE LANCETS Misc      • TRUE METRIX BLOOD GLUCOSE TEST strip FINGER STICK BLOOD SUGAR ONCE DAILY FASTING AND IF SYMPTOMATIC 50 Strip 11   • LUMIGAN 0.01 % Solution 1 Drop every bedtime.     • meclizine (ANTIVERT) 25 MG Tab Take 1 Tab by mouth 2 times a day as needed. 30 Tab 1     No current facility-administered medications for this visit.        Social History   Substance Use Topics   • Smoking status: Former Smoker     Years: 5.00   • Smokeless tobacco: Never Used   • Alcohol use No       Family History   Problem Relation Age of Onset   • Diabetes Mother    • Heart Disease Mother        ROS:  Review of Systems   See HPI Above        Exam:  Blood pressure 144/78, pulse (!) 107, temperature 36.3 °C (97.4 °F), resp. rate 20, height 1.549 m (5' 1\"), weight 104.3 kg (230 lb), SpO2 91 %.  General:  Well nourished, over -weight, well developed female in mild discomfort.  HENT:Head is grossly normal. PERRL. Posterior pharynx is normal with no exudates.  Neck: Supple. Trachea is midline. No swollen or tender lymph nodes.  Pulmonary: Lung sounds include scattered rhonchi and expiratory wheezes throughout to ausculation .  Normal effort. No rales.  Speaks in full sentences. Voice is slightly hoarse.    Cardiovascular: Regular rate and rhythm.  Abdomen-Abdomen is soft, No tenderness.  Upper extremities-  Good ROM  Lower extremities- neg for edema, redness, tenderness.  Neuro- A & O x 4. Speech clear, mildly hoarse and appropriate.     Current medications, allergies, and problem list reviewed with patient and updated in  [facility] today.    Assessment/Plan:  1. Cough  benzonatate (TESSALON) " 100 MG Cap    guaifenesin-codeine (TUSSI-ORGANIDIN NR) 100-10 MG/5ML syrup   2. Mild persistent asthma, uncomplicated  albuterol (PROVENTIL) 2.5mg/3ml Nebu Soln solution for nebulization   3. Wheezing  MethylPREDNISolone (MEDROL DOSEPAK) 4 MG Tablet Therapy Pack   4. Moderate persistent asthma with acute exacerbation  MethylPREDNISolone (MEDROL DOSEPAK) 4 MG Tablet Therapy Pack   5. Moderate persistent asthmatic bronchitis with acute exacerbation  azithromycin (ZITHROMAX) 250 MG Tab-  CONTINGENT if cough worsens and discolored phlegm or mild fever.   Follow up as needed. Call or return if questions, concerns, or worsening condition.

## 2017-12-20 NOTE — ASSESSMENT & PLAN NOTE
"3 days ago dry cough onset. Then feels tight cough w wheezing./  Non productive cough.   Can't sleep at night due to cough  States \"it is all Asthma\" Just got her own Nebulizer back from family  Reports sob  "

## 2017-12-21 ENCOUNTER — APPOINTMENT (OUTPATIENT)
Dept: PHYSICAL THERAPY | Facility: REHABILITATION | Age: 57
End: 2017-12-21
Attending: NURSE PRACTITIONER
Payer: MEDICAID

## 2017-12-28 ENCOUNTER — HOSPITAL ENCOUNTER (OUTPATIENT)
Dept: RADIOLOGY | Facility: MEDICAL CENTER | Age: 57
End: 2017-12-28
Attending: NURSE PRACTITIONER
Payer: MEDICAID

## 2017-12-28 PROCEDURE — G0202 SCR MAMMO BI INCL CAD: HCPCS

## 2018-01-04 ENCOUNTER — HOSPITAL ENCOUNTER (OUTPATIENT)
Dept: RADIOLOGY | Facility: MEDICAL CENTER | Age: 58
End: 2018-01-04

## 2018-01-11 RX ORDER — AMLODIPINE BESYLATE 10 MG/1
TABLET ORAL
Qty: 30 TAB | Refills: 4 | Status: SHIPPED | OUTPATIENT
Start: 2018-01-11 | End: 2018-06-18 | Stop reason: SDUPTHER

## 2018-02-05 ENCOUNTER — TELEPHONE (OUTPATIENT)
Dept: PHYSICAL THERAPY | Facility: REHABILITATION | Age: 58
End: 2018-02-05

## 2018-02-05 NOTE — OP THERAPY DISCHARGE SUMMARY
Outpatient Physical Therapy  DISCHARGE SUMMARY NOTE      [facility] [address].  [address]  [place] [address]  Phone:  [phone]  Fax:  [phone]    Date of Visit: 2018    Patient: Natalie Eugene  YOB: 1960  MRN: [id number]     Referring Provider: No referring provider defined for this encounter.   Referring Diagnosis No admission diagnoses are documented for this encounter.     Physical Therapy Occurrence Codes    Date physical therapy treatment started:  17          Functional Limitation G-Codes and Severity Modifiers     Goal:     Discharge:         Your patient is being discharged from Physical Therapy with the following comments:   · Goals partially met    Comments:  Ms. Eugene made fair progress with PT.  She was seen for 4 sessions treating her R LE/calf pain.  Symptoms consistent with neural radiculopathy.  Unclear of the exact source, but was responding well to neural glides.  Had planned to continue; however, pt cancelled and failed to reschedule.      Recommendations:  D/C due to auth .     Kesha Gracia, PT, DPT    Date: 2018

## 2018-02-20 DIAGNOSIS — E78.49 OTHER HYPERLIPIDEMIA: ICD-10-CM

## 2018-02-20 RX ORDER — ATORVASTATIN CALCIUM 40 MG/1
TABLET, FILM COATED ORAL
Qty: 30 TAB | Refills: 11 | Status: SHIPPED | OUTPATIENT
Start: 2018-02-20 | End: 2019-02-13 | Stop reason: SDUPTHER

## 2018-03-12 ENCOUNTER — OFFICE VISIT (OUTPATIENT)
Dept: MEDICAL GROUP | Facility: MEDICAL CENTER | Age: 58
End: 2018-03-12
Attending: NURSE PRACTITIONER
Payer: MEDICAID

## 2018-03-12 VITALS
HEIGHT: 61 IN | DIASTOLIC BLOOD PRESSURE: 80 MMHG | OXYGEN SATURATION: 94 % | RESPIRATION RATE: 16 BRPM | HEART RATE: 88 BPM | TEMPERATURE: 97.4 F | BODY MASS INDEX: 44.37 KG/M2 | SYSTOLIC BLOOD PRESSURE: 138 MMHG | WEIGHT: 235 LBS

## 2018-03-12 DIAGNOSIS — S61.211D LACERATION OF LEFT INDEX FINGER WITHOUT FOREIGN BODY WITHOUT DAMAGE TO NAIL, SUBSEQUENT ENCOUNTER: ICD-10-CM

## 2018-03-12 DIAGNOSIS — Z00.00 ROUTINE ADULT HEALTH MAINTENANCE: ICD-10-CM

## 2018-03-12 PROBLEM — S61.211A LACERATION OF LEFT INDEX FINGER: Status: ACTIVE | Noted: 2018-03-12

## 2018-03-12 PROCEDURE — 99213 OFFICE O/P EST LOW 20 MIN: CPT | Performed by: NURSE PRACTITIONER

## 2018-03-12 PROCEDURE — A6402 STERILE GAUZE <= 16 SQ IN: HCPCS | Performed by: NURSE PRACTITIONER

## 2018-03-12 PROCEDURE — 99212 OFFICE O/P EST SF 10 MIN: CPT | Performed by: NURSE PRACTITIONER

## 2018-03-12 NOTE — PROGRESS NOTES
"Chief Complaint:   Chief Complaint   Patient presents with   • Laceration     left index finger        HPI:  Natalie is here today as same day appointment for concerns related to \"finger cut\"    Her PMH includes:  Situational Stress  Asthma  HTN  Hyperlipidemia  Obesity  Vitamin D Deficiency  DM-2  Right Knee DJD  Splenectomy  Tonsillectomy    Cholecystectomy  Chronic Hoarse Throat  FIT Stool TEST positive (2017 and 10/2017)  Left calf strain     Established with    Orthopedics- Dr Ospina ([facility])     Previous Referrals Approved:  Psychiatry- DR Jin and assoc  [id number]        Review of Records shows:  18 Clinic for Cough and congestion, w wheezing. Dx Asthmatic Bronchitis, RX Robit/Codeine, Zpak, Medrol Dosepak, Albuterol Nebulizer sol'n, Tessalon.  17 Clinic visit for f/u on calf pain and for discomfort assox.  17 [facility] appt  17 Clinic Left Thumb burn visit  17 Clinic for results of 2nd pos fit test, To see GI on 17 for consult, Right Leg throbbing, Pt asking for trial of Gabapentin.  Referral to physical therapy, Continue Glipizide BID for DM, LAbs ordered for near future r/t DM  10/30/17 Telephone Encounter Pt calls asking for RX for Gabapentin r/t leg \"throbbing\"---> RX sent w instructions to start slowly.    Nev  Report:   18 Tramadol 50 mg # 14 by Dr Ospina ([facility])  5 RX and 2 Prescribers in report      Laceration of left index finger  Pt reports last night ~ 5 pm, cutting brocoli bag and lac to tip of   Left index finger. Reports did not want to go to ER as she thought they were going to put stitches to the index finger tip pad and did not want that. Last Tetanus 3 yrs ago.        Patient Active Problem List    Diagnosis Date Noted   • Laceration of left index finger 2018   • Routine adult health maintenance 2018   • Cough 2017   • Stress incontinence of urine 2017   • Burn, thumb, " first degree, left, initial encounter 11/13/2017   • Right calf pain 11/01/2017   • Fecal occult blood test positive 10/04/2017   • Injury of right lower extremity 08/22/2017   • Environmental allergies 03/30/2017   • Morbid obesity with BMI of 45.0-49.9, adult ([facility]) 02/16/2017   • Mood changes (CMS-HCC) 12/13/2016   • Hoarseness, persistent 07/07/2016   • Insomnia 07/07/2016   • Hyperlipidemia 06/09/2016   • Osteoarthritis of knee 04/14/2016   • Vision problems 03/24/2016   • Right knee pain 03/24/2016   • Vitamin D deficiency 11/12/2015   • Type 2 diabetes mellitus without complication (CMS-HCC) 11/12/2015   • Essential hypertension 10/22/2015   • Moderate persistent asthma 10/22/2015   • Episodic headache 10/22/2015   • H/O splenectomy 10/22/2015       Allergies:Voltaren [diclofenac-disod edta]    Medicines as of today:  Current Outpatient Prescriptions   Medication Sig Dispense Refill   • atorvastatin (LIPITOR) 40 MG Tab TAKE 1 TABLET ORALLY ONCE DAILY 30 Tab 11   • amlodipine (NORVASC) 10 MG Tab TAKE 1 TABLET ORALLY ONCE DAILY 30 Tab 4   • hydrOXYzine HCl (ATARAX) 25 MG Tab TAKE 1 TABLET ORALLY NIGHTLY AT BEDTIME IF NEEDED FOR ANXIETY/INSOMNIA 30 Tab 5   • albuterol (PROVENTIL) 2.5mg/3ml Nebu Soln solution for nebulization 3 mL by Nebulization route every four hours as needed for Shortness of Breath. 75 mL 3   • MethylPREDNISolone (MEDROL DOSEPAK) 4 MG Tablet Therapy Pack Take 1 Tab by mouth every day. 1 Kit 0   • benzonatate (TESSALON) 100 MG Cap Take 1 Cap by mouth 3 times a day as needed. 30 Cap 0   • azithromycin (ZITHROMAX) 250 MG Tab Z pack-Take 2 tabs day one and then 1 tab daily for a total of 5 days 6 Tab 0   • montelukast (SINGULAIR) 10 MG Tab TAKE 1 TABLET ORALLY ONCE DAILY 30 Tab 3   • estradiol (VAGIFEM) 10 MCG Tab Insert 1 Tab in vagina every 3 days. 8 Tab 2   • montelukast (SINGULAIR) 10 MG Tab TAKE 1 TABLET ORALLY ONCE DAILY 30 Tab 2   • glipiZIDE (GLUCOTROL) 5 MG Tab TAKE 1 TABLET ORALLY 2  "TIMES DAILY 60 Tab 2   • silver sulfADIAZINE (SILVADENE) 1 % Cream Apply small amt to left thumb skin burn/blisters once a day. 1 Each 0   • gabapentin (NEURONTIN) 100 MG Cap Take 1 Cap by mouth 3 times a day. 90 Cap 1   • carbamazepine (TEGRETOL) 200 MG Tab TAKE 1 TABLET ORALLY EVERY MORNING AND 2 TABLETS BY MOUTH EVERY EVENING 90 Tab 3   • ipratropium (ATROVENT) 17 MCG/ACT Aero Soln Inhale 2 Puffs by mouth every 6 hours. 17 g 5   • albuterol 108 (90 Base) MCG/ACT Aero Soln inhalation aerosol Inhale 2 Puffs by mouth every 6 hours as needed for Shortness of Breath. 8.5 g 5   • valsartan (DIOVAN) 160 MG Tab Take 1 Tab by mouth every day. 30 Tab 5   • Cholecalciferol 4000 units Cap Take 1 Capsule by mouth every day. 30 Cap 5   • hydrocodone-acetaminophen (NORCO) 5-325 MG Tab per tablet Take 1 Tab by mouth every 8 hours as needed. 21 Tab 0   • glucose blood (TRUE METRIX BLOOD GLUCOSE TEST) strip 1 Strip by Other route as needed. 50 Strip 2   • Blood Glucose Monitoring Suppl (TRUE METRIX AIR GLUCOSE METER) Device 1 Each by Does not apply route every day. 1 Device 0   • TECHLITE LANCETS Misc      • TRUE METRIX BLOOD GLUCOSE TEST strip FINGER STICK BLOOD SUGAR ONCE DAILY FASTING AND IF SYMPTOMATIC 50 Strip 11   • LUMIGAN 0.01 % Solution 1 Drop every bedtime.     • meclizine (ANTIVERT) 25 MG Tab Take 1 Tab by mouth 2 times a day as needed. 30 Tab 1     No current facility-administered medications for this visit.        Social History   Substance Use Topics   • Smoking status: Former Smoker     Years: 5.00   • Smokeless tobacco: Never Used   • Alcohol use No       Past Medical History:   Diagnosis Date   • Asthma    • Diabetes (CMS-[facility])    • Hypertension        Family History   Problem Relation Age of Onset   • Diabetes Mother    • Heart Disease Mother        ROS:  Review of Systems   See HPI Above    Exam:  Blood pressure 138/80, pulse 88, temperature 36.3 °C (97.4 °F), resp. rate 16, height 1.549 m (5' 0.98\"), weight " 106.6 kg (235 lb), SpO2 94 %. Body mass index is 44.43 kg/m².    General:  Well nourished, over-weight, well developed female in NAD  HENT:Head is grossly normal. PERRL.  Neck: Supple. Trachea is midline.  Pulmonary: Speaks in full sentences easily. Normal effort.   Cardiovascular: Regular rate and rhythm.  Abdomen-Abdomen is soft  Upper extremities-  Good ROM, small lac to distal pad of left index finger, no bleeding.No drainage or signs of infection.  Lower extremities- neg for edema, redness, tenderness.  Neuro- A & O x 4. Speech clear and appropriate.    Current medications, allergies, and problem list reviewed with patient and updated in [facility] today.    Assessment/Plan:  1. Laceration of left index finger without foreign body without damage to nail, subsequent encounter  Cleansing and Steri-Strip application by Deirdre medical assistant      2. Routine adult health maintenance  Pt to schedule PAP/Well Woman's Visit here  Recent mammogram reviewed and f/u Mammo in 1 yr recommended.       No Follow-up on file.

## 2018-03-12 NOTE — ASSESSMENT & PLAN NOTE
Pt reports last night ~ 5 pm, cutting brocoli bag and lac to tip of   Left index finger. Reports did not want to go to ER as she thought they were going to put stitches to the index finger tip pad and did not want that. Last Tetanus 3 yrs ago.

## 2018-03-19 RX ORDER — GLIPIZIDE 5 MG/1
TABLET ORAL
Qty: 60 TAB | Refills: 1 | Status: SHIPPED | OUTPATIENT
Start: 2018-03-19 | End: 2018-05-16 | Stop reason: SDUPTHER

## 2018-03-19 RX ORDER — CARBAMAZEPINE 200 MG/1
TABLET ORAL
Qty: 90 TAB | Refills: 2 | Status: SHIPPED | OUTPATIENT
Start: 2018-03-19 | End: 2018-06-18 | Stop reason: SDUPTHER

## 2018-03-28 ENCOUNTER — OFFICE VISIT (OUTPATIENT)
Dept: MEDICAL GROUP | Facility: MEDICAL CENTER | Age: 58
End: 2018-03-28
Attending: NURSE PRACTITIONER
Payer: MEDICAID

## 2018-03-28 ENCOUNTER — HOSPITAL ENCOUNTER (OUTPATIENT)
Facility: MEDICAL CENTER | Age: 58
End: 2018-03-28
Attending: NURSE PRACTITIONER
Payer: MEDICAID

## 2018-03-28 VITALS
BODY MASS INDEX: 44.37 KG/M2 | WEIGHT: 235 LBS | TEMPERATURE: 97.7 F | SYSTOLIC BLOOD PRESSURE: 115 MMHG | OXYGEN SATURATION: 91 % | HEART RATE: 88 BPM | DIASTOLIC BLOOD PRESSURE: 60 MMHG | RESPIRATION RATE: 14 BRPM | HEIGHT: 61 IN

## 2018-03-28 DIAGNOSIS — R19.5 FECAL OCCULT BLOOD TEST POSITIVE: ICD-10-CM

## 2018-03-28 DIAGNOSIS — Z01.419 ENCOUNTER FOR ROUTINE GYNECOLOGICAL EXAMINATION WITH PAPANICOLAOU SMEAR OF CERVIX: ICD-10-CM

## 2018-03-28 DIAGNOSIS — Z12.72 SPECIAL SCREENING FOR MALIGNANT NEOPLASM OF VAGINA: ICD-10-CM

## 2018-03-28 PROCEDURE — 88175 CYTOPATH C/V AUTO FLUID REDO: CPT

## 2018-03-28 PROCEDURE — 87624 HPV HI-RISK TYP POOLED RSLT: CPT

## 2018-03-28 PROCEDURE — 87660 TRICHOMONAS VAGIN DIR PROBE: CPT

## 2018-03-28 PROCEDURE — 87480 CANDIDA DNA DIR PROBE: CPT

## 2018-03-28 PROCEDURE — 99212 OFFICE O/P EST SF 10 MIN: CPT | Performed by: NURSE PRACTITIONER

## 2018-03-28 PROCEDURE — 87491 CHLMYD TRACH DNA AMP PROBE: CPT

## 2018-03-28 PROCEDURE — 87591 N.GONORRHOEAE DNA AMP PROB: CPT

## 2018-03-28 PROCEDURE — 87510 GARDNER VAG DNA DIR PROBE: CPT

## 2018-03-28 NOTE — ASSESSMENT & PLAN NOTE
Pt in clinic today for a well woman cervical cancer screening exam.Pt stating that she has been feeling well. No vaginal DC/odor, urinary symptoms. Pt is currently NOT sexually active.. Last pap smear 5 years ago  She denies h/o abnormal pap smear. No h/o sexually transmitted infections. Last mammogram 17 Pt does not perform monthly self breast exams. No breast concerns. Colon ca screenin17 and 10/9 /17---> Pt reports has Colonoscopy and GI Consult in early May 2018.  Denies bloody stools.

## 2018-03-28 NOTE — ASSESSMENT & PLAN NOTE
WE discussed her two positive Fit Tests ( 9/2017 and 10/2017). Pt reports has appt for Colonoscopy and GI consult in early May.  Denies bloody stools.

## 2018-03-28 NOTE — PROGRESS NOTES
"Chief Complaint:   Chief Complaint   Patient presents with   • Gynecologic Exam   Her PMH includes:  Situational Stress  Asthma  HTN  Hyperlipidemia  Obesity  Vitamin D Deficiency  DM-2  Right Knee DJD  Splenectomy  Tonsillectomy    Cholecystectomy  Chronic Hoarse Throat  FIT Stool TEST positive (2017 and 10/2017)  Left calf strain     Established with    Orthopedics- Dr Ospina ([facility])     Previous Referrals Approved:  Psychiatry- DR Jin and assoc  [id number]        Review of Records shows:  3/12/18 Clinic for small lac to left index finger. Steri-strip and wound care discussed. REveiwed recent normal Mammogram.  To schedule Pap.  18 Clinic for Cough and congestion, w wheezing. Dx Asthmatic Bronchitis, RX Robit/Codeine, Zpak, Medrol Dosepak, Albuterol Nebulizer sol'n, Tessalon.  17 Clinic visit for f/u on calf pain and for discomfort assox.  17 [facility] appt  17 Clinic Left Thumb burn visit  17 Clinic for results of 2nd pos fit test, To see GI on 17 for consult, Right Leg throbbing, Pt asking for trial of Gabapentin.  Referral to physical therapy, Continue Glipizide BID for DM, LAbs ordered for near future r/t DM  10/30/17 Telephone Encounter Pt calls asking for RX for Gabapentin r/t leg \"throbbing\"---> RX sent w instructions to start slowly.       HPI:  Natalie is here today for Pap Smear    Nev  Report:   3/20/18 Tramadol 50 mg # 14 by Dr Ospina (ortho)  18 similar entry  5 RX and 2 Prescribers in 12 month report  ----------------------------------------------------------    Encounter for routine gynecological examination with Papanicolaou smear of cervix  Pt in clinic today for a well woman cervical cancer screening exam.Pt stating that she has been feeling well. No vaginal DC/odor, urinary symptoms. Pt is currently NOT sexually active.. Last pap smear 5 years ago  She denies h/o abnormal pap smear. No h/o sexually " transmitted infections. Last mammogram 17 Pt does not perform monthly self breast exams. No breast concerns. Colon ca screenin17 and 10/9 /17---> Pt reports has Colonoscopy and GI Consult in early May 2018.  Denies bloody stools.    Fecal occult blood test positive  WE discussed her two positive Fit Tests ( 2017 and 10/2017). Pt reports has appt for Colonoscopy and GI consult in early May.  Denies bloody stools.       Patient Active Problem List    Diagnosis Date Noted   • Encounter for routine gynecological examination with Papanicolaou smear of cervix 2018   • Laceration of left index finger 2018   • Routine adult health maintenance 2018   • Cough 2017   • Stress incontinence of urine 2017   • Burn, thumb, first degree, left, initial encounter 2017   • Right calf pain 2017   • Fecal occult blood test positive 10/04/2017   • Injury of right lower extremity 2017   • Environmental allergies 2017   • Morbid obesity with BMI of 45.0-49.9, adult ([facility]) 2017   • Mood changes (CMS-HCC) 2016   • Hoarseness, persistent 2016   • Insomnia 2016   • Hyperlipidemia 2016   • Osteoarthritis of knee 2016   • Vision problems 2016   • Right knee pain 2016   • Vitamin D deficiency 2015   • Type 2 diabetes mellitus without complication (CMS-HCC) 2015   • Essential hypertension 10/22/2015   • Moderate persistent asthma 10/22/2015   • Episodic headache 10/22/2015   • H/O splenectomy 10/22/2015       Allergies:Voltaren [diclofenac-disod edta]    Medicines as of today:  Current Outpatient Prescriptions   Medication Sig Dispense Refill   • carBAMazepine (TEGRETOL) 200 MG Tab TAKE 1 TABLET ORALLY IN THE MORNING AND TAKE 2 TABLETS ORALLY IN THE EVENING 90 Tab 2   • glipiZIDE (GLUCOTROL) 5 MG Tab TAKE 1 TABLET ORALLY 2 TIMES DAILY 60 Tab 1   • atorvastatin (LIPITOR) 40 MG Tab TAKE 1 TABLET ORALLY ONCE DAILY 30 Tab 11    • amlodipine (NORVASC) 10 MG Tab TAKE 1 TABLET ORALLY ONCE DAILY 30 Tab 4   • hydrOXYzine HCl (ATARAX) 25 MG Tab TAKE 1 TABLET ORALLY NIGHTLY AT BEDTIME IF NEEDED FOR ANXIETY/INSOMNIA 30 Tab 5   • albuterol (PROVENTIL) 2.5mg/3ml Nebu Soln solution for nebulization 3 mL by Nebulization route every four hours as needed for Shortness of Breath. 75 mL 3   • MethylPREDNISolone (MEDROL DOSEPAK) 4 MG Tablet Therapy Pack Take 1 Tab by mouth every day. 1 Kit 0   • benzonatate (TESSALON) 100 MG Cap Take 1 Cap by mouth 3 times a day as needed. 30 Cap 0   • azithromycin (ZITHROMAX) 250 MG Tab Z pack-Take 2 tabs day one and then 1 tab daily for a total of 5 days 6 Tab 0   • montelukast (SINGULAIR) 10 MG Tab TAKE 1 TABLET ORALLY ONCE DAILY 30 Tab 3   • estradiol (VAGIFEM) 10 MCG Tab Insert 1 Tab in vagina every 3 days. 8 Tab 2   • montelukast (SINGULAIR) 10 MG Tab TAKE 1 TABLET ORALLY ONCE DAILY 30 Tab 2   • silver sulfADIAZINE (SILVADENE) 1 % Cream Apply small amt to left thumb skin burn/blisters once a day. 1 Each 0   • gabapentin (NEURONTIN) 100 MG Cap Take 1 Cap by mouth 3 times a day. 90 Cap 1   • ipratropium (ATROVENT) 17 MCG/ACT Aero Soln Inhale 2 Puffs by mouth every 6 hours. 17 g 5   • albuterol 108 (90 Base) MCG/ACT Aero Soln inhalation aerosol Inhale 2 Puffs by mouth every 6 hours as needed for Shortness of Breath. 8.5 g 5   • valsartan (DIOVAN) 160 MG Tab Take 1 Tab by mouth every day. 30 Tab 5   • Cholecalciferol 4000 units Cap Take 1 Capsule by mouth every day. 30 Cap 5   • hydrocodone-acetaminophen (NORCO) 5-325 MG Tab per tablet Take 1 Tab by mouth every 8 hours as needed. 21 Tab 0   • glucose blood (TRUE METRIX BLOOD GLUCOSE TEST) strip 1 Strip by Other route as needed. 50 Strip 2   • Blood Glucose Monitoring Suppl (TRUE METRIX AIR GLUCOSE METER) Device 1 Each by Does not apply route every day. 1 Device 0   • TECHLITE LANCETS Misc      • TRUE METRIX BLOOD GLUCOSE TEST strip FINGER STICK BLOOD SUGAR ONCE DAILY  "FASTING AND IF SYMPTOMATIC 50 Strip 11   • LUMIGAN 0.01 % Solution 1 Drop every bedtime.     • meclizine (ANTIVERT) 25 MG Tab Take 1 Tab by mouth 2 times a day as needed. 30 Tab 1     No current facility-administered medications for this visit.        Social History   Substance Use Topics   • Smoking status: Former Smoker     Years: 5.00   • Smokeless tobacco: Never Used   • Alcohol use No       Past Medical History:   Diagnosis Date   • Asthma    • Diabetes (CMS-[facility])    • Hypertension        Family History   Problem Relation Age of Onset   • Diabetes Mother    • Heart Disease Mother        ROS:  Review of Systems   See HPI Above    Exam:  Blood pressure 115/60, pulse 88, temperature 36.5 °C (97.7 °F), resp. rate 14, height 1.549 m (5' 1\"), weight 106.6 kg (235 lb), SpO2 91 %. Body mass index is 44.4 kg/m².    General:  Well nourished,over weight pleasant  female in NAD  HENT:Head is grossly normal.   Neck: Supple. Trachea is midline.  Pulmonary:   Normal effort.    Cardiovascular: Regular rate and rhythm.  Abdomen-Abdomen is soft  Pelvic- Cuco CHOWDARY Chaperone, me assist NP Student, Asher NICHOLSON completes Pap and exam  Labia wnl, moist pink, no lesion. Cervix closed, Brush for Pap, swabs for testing completed.  Patient tolerated well.  Upper extremities-  Good ROM  Lower extremities- neg for edema, redness, tenderness.  Neuro- A & O x 4. Speech clear and appropriate.    Current medications, allergies, and problem list reviewed with patient and updated in [facility] today.    Assessment/Plan:  1. Encounter for routine gynecological examination with Papanicolaou smear of cervix  VAGINAL PATHOGENS DNA PANEL    CHLAMYDIA/GC PCR URINE OR SWAB   2. Special screening for malignant neoplasm of vagina  THINPREP PAP W/HPV AND CTNG   3. Fecal occult blood test positive  Pt to keep appt for colonoscopy in May w [facility].   Pt to call for Results.    Return if symptoms worsen or fail to improve.  "

## 2018-03-29 LAB
CANDIDA DNA VAG QL PROBE+SIG AMP: NEGATIVE
G VAGINALIS DNA VAG QL PROBE+SIG AMP: NEGATIVE
T VAGINALIS DNA VAG QL PROBE+SIG AMP: NEGATIVE

## 2018-03-31 LAB
C TRACH DNA GENITAL QL NAA+PROBE: NEGATIVE
CYTOLOGY REG CYTOL: NORMAL
HPV HR 12 DNA CVX QL NAA+PROBE: NEGATIVE
HPV16 DNA SPEC QL NAA+PROBE: NEGATIVE
HPV18 DNA SPEC QL NAA+PROBE: NEGATIVE
N GONORRHOEA DNA GENITAL QL NAA+PROBE: NEGATIVE
SPECIMEN SOURCE: NORMAL
SPECIMEN SOURCE: NORMAL

## 2018-04-03 ENCOUNTER — TELEPHONE (OUTPATIENT)
Dept: MEDICAL GROUP | Facility: MEDICAL CENTER | Age: 58
End: 2018-04-03

## 2018-04-03 NOTE — TELEPHONE ENCOUNTER
1. Caller Name: Natalie                                         Call Back Number: [phone] (home)         Patient approves a detailed voicemail message: yes    2. Patient is requesting Pap Smear  results dated: 3/29/18-3/30/18    3. Confirmed results are in chart. Patient advised they will be contacted once interpreted by provider.

## 2018-04-03 NOTE — TELEPHONE ENCOUNTER
Please call Natalie back and let her know her recent Pap Smear showed   No signs of any infections, and cells looked normal and no sign   Of abnormalities or malignancy. Also negative for HPV virus  This is all good.      Doug

## 2018-04-18 DIAGNOSIS — I10 ESSENTIAL HYPERTENSION: ICD-10-CM

## 2018-04-18 RX ORDER — VALSARTAN 160 MG/1
TABLET ORAL
Qty: 30 TAB | Refills: 2 | Status: SHIPPED | OUTPATIENT
Start: 2018-04-18 | End: 2018-07-17 | Stop reason: SDUPTHER

## 2018-04-19 ENCOUNTER — TELEPHONE (OUTPATIENT)
Dept: MEDICAL GROUP | Facility: MEDICAL CENTER | Age: 58
End: 2018-04-19

## 2018-04-19 NOTE — TELEPHONE ENCOUNTER
1. Caller Name: Natalie                                         Call Back Number: [phone] (home)         Patient approves a detailed voicemail message: yes    Patient called and stated that she is having horrible hot flashes, and she has already gone thru menopause about 7 years ago. Patient would like to know what to do, and if there are any OTC medication that may help. Please adivse

## 2018-05-08 ENCOUNTER — OFFICE VISIT (OUTPATIENT)
Dept: MEDICAL GROUP | Facility: MEDICAL CENTER | Age: 58
End: 2018-05-08
Attending: NURSE PRACTITIONER
Payer: MEDICAID

## 2018-05-08 VITALS
BODY MASS INDEX: 45.31 KG/M2 | DIASTOLIC BLOOD PRESSURE: 76 MMHG | HEIGHT: 61 IN | HEART RATE: 70 BPM | RESPIRATION RATE: 16 BRPM | OXYGEN SATURATION: 97 % | WEIGHT: 240 LBS | SYSTOLIC BLOOD PRESSURE: 102 MMHG | TEMPERATURE: 97.1 F

## 2018-05-08 DIAGNOSIS — Z13.21 ENCOUNTER FOR VITAMIN DEFICIENCY SCREENING: ICD-10-CM

## 2018-05-08 DIAGNOSIS — I10 ESSENTIAL HYPERTENSION: ICD-10-CM

## 2018-05-08 DIAGNOSIS — Z13.220 SCREENING FOR CHOLESTEROL LEVEL: ICD-10-CM

## 2018-05-08 DIAGNOSIS — Z13.29 SCREENING FOR THYROID DISORDER: ICD-10-CM

## 2018-05-08 DIAGNOSIS — R19.5 FECAL OCCULT BLOOD TEST POSITIVE: ICD-10-CM

## 2018-05-08 DIAGNOSIS — R23.3 EASY BRUISING: ICD-10-CM

## 2018-05-08 DIAGNOSIS — E11.9 TYPE 2 DIABETES MELLITUS WITHOUT COMPLICATION, WITHOUT LONG-TERM CURRENT USE OF INSULIN (HCC): ICD-10-CM

## 2018-05-08 DIAGNOSIS — Z13.0 SCREENING FOR IRON DEFICIENCY ANEMIA: ICD-10-CM

## 2018-05-08 PROCEDURE — 99213 OFFICE O/P EST LOW 20 MIN: CPT | Performed by: NURSE PRACTITIONER

## 2018-05-08 PROCEDURE — 99214 OFFICE O/P EST MOD 30 MIN: CPT | Performed by: NURSE PRACTITIONER

## 2018-05-08 RX ORDER — LANCETS 30 GAUGE
EACH MISCELLANEOUS
Qty: 100 EACH | Refills: 5 | Status: SHIPPED | OUTPATIENT
Start: 2018-05-08

## 2018-05-08 RX ORDER — BLOOD-GLUCOSE METER
1 EACH MISCELLANEOUS DAILY
Qty: 1 DEVICE | Refills: 0 | Status: SHIPPED | OUTPATIENT
Start: 2018-05-08 | End: 2019-01-17

## 2018-05-08 NOTE — ASSESSMENT & PLAN NOTE
Pt reports she has not been checking her sugars as has not had a glucometer for some time.  Recommending we check some labs and RX for new machine and supplies.

## 2018-05-08 NOTE — ASSESSMENT & PLAN NOTE
/76       in clinic today. Known hx of HTN.  Pt stating that they have been taking their medications( Amlodipine and Valsartan) as prescribed without adverse effect. Pt denies HA, vision changes, neurologic deficits, CP, SOB.

## 2018-05-08 NOTE — PROGRESS NOTES
"Chief Complaint:   Chief Complaint   Patient presents with   • Bleeding/Bruising     B/L hands x2days        HPI:  Natalie is here today for concern about easy bruising.    Her PMH includes:  Situational Stress  Asthma  HTN  Hyperlipidemia  Obesity  Vitamin D Deficiency  DM-2  Right Knee DJD  Splenectomy  Tonsillectomy    Cholecystectomy  Chronic Hoarse Throat  FIT Stool TEST positive (2017 and 10/2017)  Left calf strain     Established with    Orthopedics- Dr Ospina ([facility])  [facility]        Review of Records shows:  4/3/18 Telephone Encounter to Natalie w normal Pap smear results.  3/28/18 Clinic Visit for Pap Smear  3/12/18 Clinic for small lac to left index finger. Steri-strip and wound care discussed. REveiwed recent normal Mammogram.  To schedule Pap.  18 Clinic for Cough and congestion, w wheezing. Dx Asthmatic Bronchitis, RX Robit/Codeine, Zpak, Medrol Dosepak, Albuterol Nebulizer sol'n, Tessalon.  17 Clinic visit for f/u on calf pain and for discomfort assox.  17 [facility] appt  17 Clinic Left Thumb burn visit  17 Clinic for results of 2nd pos fit test, To see GI on 17 for consult, Right Leg throbbing, Pt asking for trial of Gabapentin.  Referral to physical therapy, Continue Glipizide BID for DM, LAbs ordered for near future r/t DM  10/30/17 Telephone Encounter Pt calls asking for RX for Gabapentin r/t leg \"throbbing\"---> RX sent w instructions to start slowly.       Nev  Report:   3/20/18 Tramadol 50 mg # 14 by Dr Ospina (ortho)  18 similar entry  5 RX and 2 Prescribers in 12 month report  ----------------------------------------------------------      Easy bruising  Natalie reports anytime she hits very lightly her hands on anything she gets bruises easily.Has been happening for a few months Has been taking Ibupofen for weeks but stopped a week AGO.  Not taking ASA or Omega 3FA's.     Has hx of pos FIT test being w/u by GI. "     November CBC shows slightly elevated platelets = 455, and otherwise normal CBC.    Discussed rechecking some labs including CBC, PT, PTT  And due for check on f/u DM labs as well.    Essential hypertension  /76       in clinic today. Known hx of HTN.  Pt stating that they have been taking their medications( Amlodipine and Valsartan) as prescribed without adverse effect. Pt denies HA, vision changes, neurologic deficits, CP, SOB.     Type 2 diabetes mellitus without complication  Pt reports she has not been checking her sugars as has not had a glucometer for some time.  Recommending we check some labs and RX for new machine and supplies.      Fecal occult blood test positive  Hx of hidden blood in stools and has Colonoscopy this week w [facility].   Denies seeing blood in stools.       Patient Active Problem List    Diagnosis Date Noted   • Easy bruising 05/08/2018   • Encounter for routine gynecological examination with Papanicolaou smear of cervix 03/28/2018   • Laceration of left index finger 03/12/2018   • Routine adult health maintenance 03/12/2018   • Cough 12/19/2017   • Stress incontinence of urine 11/29/2017   • Burn, thumb, first degree, left, initial encounter 11/13/2017   • Right calf pain 11/01/2017   • Fecal occult blood test positive 10/04/2017   • Injury of right lower extremity 08/22/2017   • Environmental allergies 03/30/2017   • Morbid obesity with BMI of 45.0-49.9, adult ([facility]) 02/16/2017   • Mood changes ([facility]) 12/13/2016   • Hoarseness, persistent 07/07/2016   • Insomnia 07/07/2016   • Hyperlipidemia 06/09/2016   • Osteoarthritis of knee 04/14/2016   • Vision problems 03/24/2016   • Right knee pain 03/24/2016   • Vitamin D deficiency 11/12/2015   • Type 2 diabetes mellitus without complication ([facility]) 11/12/2015   • Essential hypertension 10/22/2015   • Moderate persistent asthma 10/22/2015   • Episodic headache 10/22/2015   • H/O splenectomy 10/22/2015       Allergies:Voltaren  [diclofenac-disod edta]    Medicines as of today:  Current Outpatient Prescriptions   Medication Sig Dispense Refill   • Blood Glucose Monitoring Suppl (TRUE METRIX AIR GLUCOSE METER) Device 1 Each by Does not apply route every day. 1 Device 0   • glucose blood (TRUE METRIX BLOOD GLUCOSE TEST) strip 1 Strip by Other route as needed. Test daily fasting in am and if symptoms 50 Strip 5   • Lancets Misc Lancets for use with glucometer, daily and if symptoms 100 Each 5   • valsartan (DIOVAN) 160 MG Tab TAKE 1 TABLET ORALLY ONCE DAILY 30 Tab 2   • carBAMazepine (TEGRETOL) 200 MG Tab TAKE 1 TABLET ORALLY IN THE MORNING AND TAKE 2 TABLETS ORALLY IN THE EVENING 90 Tab 2   • glipiZIDE (GLUCOTROL) 5 MG Tab TAKE 1 TABLET ORALLY 2 TIMES DAILY 60 Tab 1   • atorvastatin (LIPITOR) 40 MG Tab TAKE 1 TABLET ORALLY ONCE DAILY 30 Tab 11   • amlodipine (NORVASC) 10 MG Tab TAKE 1 TABLET ORALLY ONCE DAILY 30 Tab 4   • hydrOXYzine HCl (ATARAX) 25 MG Tab TAKE 1 TABLET ORALLY NIGHTLY AT BEDTIME IF NEEDED FOR ANXIETY/INSOMNIA 30 Tab 5   • estradiol (VAGIFEM) 10 MCG Tab Insert 1 Tab in vagina every 3 days. 8 Tab 2   • montelukast (SINGULAIR) 10 MG Tab TAKE 1 TABLET ORALLY ONCE DAILY 30 Tab 2   • albuterol 108 (90 Base) MCG/ACT Aero Soln inhalation aerosol Inhale 2 Puffs by mouth every 6 hours as needed for Shortness of Breath. 8.5 g 5   • Cholecalciferol 4000 units Cap Take 1 Capsule by mouth every day. 30 Cap 5   • hydrocodone-acetaminophen (NORCO) 5-325 MG Tab per tablet Take 1 Tab by mouth every 8 hours as needed. 21 Tab 0   • LUMIGAN 0.01 % Solution 1 Drop every bedtime.     • albuterol (PROVENTIL) 2.5mg/3ml Nebu Soln solution for nebulization 3 mL by Nebulization route every four hours as needed for Shortness of Breath. 75 mL 3   • montelukast (SINGULAIR) 10 MG Tab TAKE 1 TABLET ORALLY ONCE DAILY 30 Tab 3   • ipratropium (ATROVENT) 17 MCG/ACT Aero Soln Inhale 2 Puffs by mouth every 6 hours. 17 g 5   • glucose blood (TRUE METRIX BLOOD  "GLUCOSE TEST) strip 1 Strip by Other route as needed. 50 Strip 2   • Blood Glucose Monitoring Suppl (TRUE METRIX AIR GLUCOSE METER) Device 1 Each by Does not apply route every day. 1 Device 0   • TECHLITE LANCETS Misc      • TRUE METRIX BLOOD GLUCOSE TEST strip FINGER STICK BLOOD SUGAR ONCE DAILY FASTING AND IF SYMPTOMATIC 50 Strip 11   • meclizine (ANTIVERT) 25 MG Tab Take 1 Tab by mouth 2 times a day as needed. 30 Tab 1     No current facility-administered medications for this visit.        Social History   Substance Use Topics   • Smoking status: Former Smoker     Years: 5.00   • Smokeless tobacco: Never Used   • Alcohol use No       Past Medical History:   Diagnosis Date   • Asthma    • Diabetes (HCC)    • Hypertension        Family History   Problem Relation Age of Onset   • Diabetes Mother    • Heart Disease Mother        ROS:  Review of Systems   See HPI Above    Exam:  Blood pressure 102/76, pulse 70, temperature 36.2 °C (97.1 °F), resp. rate 16, height 1.549 m (5' 1\"), weight 108.9 kg (240 lb), SpO2 97 %. Body mass index is 45.35 kg/m².    General:  Well nourished, well developed female in NAD  HENT:Head is grossly normal. PERRL.  Neck: Supple. Trachea is midline.  Pulmonary: Clear to ausculation .  Normal effort. No rales, ronchi, or wheezing.   Cardiovascular: Regular rate and rhythm.  Abdomen-Abdomen is soft, No tenderness.  Upper extremities- Strong = . Good ROM, 2-3 bruises on each hand dorsum.   Lower extremities- neg for edema, redness, tenderness.  Neuro- A & O x 4. Speech clear and appropriate.    Current medications, allergies, and problem list reviewed with patient and updated in [facility] today.    Assessment/Plan:  1. Easy bruising  CBC WITH DIFFERENTIAL    APTT    PROTHROMBIN TIME    VON WILLEBRAND'S PROFILE   2. Screening for iron deficiency anemia  IRON/TOTAL IRON BIND   3. Encounter for vitamin deficiency screening  VITAMIN B12    VITAMIN D,25 HYDROXY   4. Screening for thyroid disorder  " TSH   5. Screening for cholesterol level  LIPID PROFILE   6. Type 2 diabetes mellitus without complication, without long-term current use of insulin (HCC)  COMP METABOLIC PANEL    LIPID PROFILE    HEMOGLOBIN A1C    Blood Glucose Monitoring Suppl (TRUE METRIX AIR GLUCOSE METER) Device    glucose blood (TRUE METRIX BLOOD GLUCOSE TEST) strip    Lancets Misc-Lancets   7. Essential hypertension  Continue Amlodipine and Losartan   8. Fecal occult blood test positive  Keep appt for Colonoscopy set for 5/11/18 w [facility]       Return in about 1 week (around 5/15/2018) for by phone okay for results, if needed make appt..

## 2018-05-08 NOTE — ASSESSMENT & PLAN NOTE
Natalie reports anytime she hits very lightly her hands on anything she gets bruises easily.Has been happening for a few months Has been taking Ibupofen for weeks but stopped a week AGO.  Not taking ASA or Omega 3FA's.     Has hx of pos FIT test being w/u by GI.     November CBC shows slightly elevated platelets = 455, and otherwise normal CBC.    Discussed rechecking some labs including CBC, PT, PTT  And due for check on f/u DM labs as well.

## 2018-05-09 ENCOUNTER — HOSPITAL ENCOUNTER (OUTPATIENT)
Dept: LAB | Facility: MEDICAL CENTER | Age: 58
End: 2018-05-09
Attending: NURSE PRACTITIONER
Payer: MEDICAID

## 2018-05-09 DIAGNOSIS — R23.3 EASY BRUISING: ICD-10-CM

## 2018-05-09 DIAGNOSIS — E11.9 TYPE 2 DIABETES MELLITUS WITHOUT COMPLICATION, WITHOUT LONG-TERM CURRENT USE OF INSULIN (HCC): ICD-10-CM

## 2018-05-09 DIAGNOSIS — Z13.21 ENCOUNTER FOR VITAMIN DEFICIENCY SCREENING: ICD-10-CM

## 2018-05-09 DIAGNOSIS — Z13.0 SCREENING FOR IRON DEFICIENCY ANEMIA: ICD-10-CM

## 2018-05-09 DIAGNOSIS — Z13.29 SCREENING FOR THYROID DISORDER: ICD-10-CM

## 2018-05-09 DIAGNOSIS — Z13.220 SCREENING FOR CHOLESTEROL LEVEL: ICD-10-CM

## 2018-05-09 LAB
25(OH)D3 SERPL-MCNC: 23 NG/ML (ref 30–100)
ALBUMIN SERPL BCP-MCNC: 3.9 G/DL (ref 3.2–4.9)
ALBUMIN/GLOB SERPL: 1.3 G/DL
ALP SERPL-CCNC: 70 U/L (ref 30–99)
ALT SERPL-CCNC: 18 U/L (ref 2–50)
ANION GAP SERPL CALC-SCNC: 10 MMOL/L (ref 0–11.9)
APTT PPP: 26.2 SEC (ref 24.7–36)
AST SERPL-CCNC: 17 U/L (ref 12–45)
BASOPHILS # BLD AUTO: 0.8 % (ref 0–1.8)
BASOPHILS # BLD: 0.07 K/UL (ref 0–0.12)
BILIRUB SERPL-MCNC: 0.4 MG/DL (ref 0.1–1.5)
BUN SERPL-MCNC: 17 MG/DL (ref 8–22)
CALCIUM SERPL-MCNC: 9.3 MG/DL (ref 8.5–10.5)
CHLORIDE SERPL-SCNC: 103 MMOL/L (ref 96–112)
CHOLEST SERPL-MCNC: 175 MG/DL (ref 100–199)
CO2 SERPL-SCNC: 30 MMOL/L (ref 20–33)
CREAT SERPL-MCNC: 0.56 MG/DL (ref 0.5–1.4)
EOSINOPHIL # BLD AUTO: 0.23 K/UL (ref 0–0.51)
EOSINOPHIL NFR BLD: 2.5 % (ref 0–6.9)
ERYTHROCYTE [DISTWIDTH] IN BLOOD BY AUTOMATED COUNT: 41.6 FL (ref 35.9–50)
GLOBULIN SER CALC-MCNC: 3.1 G/DL (ref 1.9–3.5)
GLUCOSE SERPL-MCNC: 112 MG/DL (ref 65–99)
HCT VFR BLD AUTO: 43.6 % (ref 37–47)
HDLC SERPL-MCNC: 70 MG/DL
HGB BLD-MCNC: 14.7 G/DL (ref 12–16)
IMM GRANULOCYTES # BLD AUTO: 0.03 K/UL (ref 0–0.11)
IMM GRANULOCYTES NFR BLD AUTO: 0.3 % (ref 0–0.9)
INR PPP: 1.12 (ref 0.87–1.13)
IRON SATN MFR SERPL: 45 % (ref 15–55)
IRON SERPL-MCNC: 135 UG/DL (ref 40–170)
LDLC SERPL CALC-MCNC: 91 MG/DL
LYMPHOCYTES # BLD AUTO: 2 K/UL (ref 1–4.8)
LYMPHOCYTES NFR BLD: 22.1 % (ref 22–41)
MCH RBC QN AUTO: 31.6 PG (ref 27–33)
MCHC RBC AUTO-ENTMCNC: 33.7 G/DL (ref 33.6–35)
MCV RBC AUTO: 93.8 FL (ref 81.4–97.8)
MONOCYTES # BLD AUTO: 0.76 K/UL (ref 0–0.85)
MONOCYTES NFR BLD AUTO: 8.4 % (ref 0–13.4)
NEUTROPHILS # BLD AUTO: 5.96 K/UL (ref 2–7.15)
NEUTROPHILS NFR BLD: 65.9 % (ref 44–72)
NRBC # BLD AUTO: 0 K/UL
NRBC BLD-RTO: 0 /100 WBC
PLATELET # BLD AUTO: 462 K/UL (ref 164–446)
PMV BLD AUTO: 9 FL (ref 9–12.9)
POTASSIUM SERPL-SCNC: 3.6 MMOL/L (ref 3.6–5.5)
PROT SERPL-MCNC: 7 G/DL (ref 6–8.2)
PROTHROMBIN TIME: 14.1 SEC (ref 12–14.6)
RBC # BLD AUTO: 4.65 M/UL (ref 4.2–5.4)
SODIUM SERPL-SCNC: 143 MMOL/L (ref 135–145)
TIBC SERPL-MCNC: 302 UG/DL (ref 250–450)
TRIGL SERPL-MCNC: 72 MG/DL (ref 0–149)
TSH SERPL DL<=0.005 MIU/L-ACNC: 2.09 UIU/ML (ref 0.38–5.33)
VIT B12 SERPL-MCNC: 375 PG/ML (ref 211–911)
WBC # BLD AUTO: 9.1 K/UL (ref 4.8–10.8)

## 2018-05-09 PROCEDURE — 36415 COLL VENOUS BLD VENIPUNCTURE: CPT

## 2018-05-09 PROCEDURE — 85610 PROTHROMBIN TIME: CPT

## 2018-05-09 PROCEDURE — 85730 THROMBOPLASTIN TIME PARTIAL: CPT

## 2018-05-09 PROCEDURE — 82306 VITAMIN D 25 HYDROXY: CPT

## 2018-05-09 PROCEDURE — 83540 ASSAY OF IRON: CPT

## 2018-05-09 PROCEDURE — 82607 VITAMIN B-12: CPT

## 2018-05-09 PROCEDURE — 80053 COMPREHEN METABOLIC PANEL: CPT

## 2018-05-09 PROCEDURE — 83036 HEMOGLOBIN GLYCOSYLATED A1C: CPT

## 2018-05-09 PROCEDURE — 85246 CLOT FACTOR VIII VW ANTIGEN: CPT

## 2018-05-09 PROCEDURE — 83550 IRON BINDING TEST: CPT

## 2018-05-09 PROCEDURE — 84443 ASSAY THYROID STIM HORMONE: CPT

## 2018-05-09 PROCEDURE — 80061 LIPID PANEL: CPT

## 2018-05-09 PROCEDURE — 85245 CLOT FACTOR VIII VW RISTOCTN: CPT

## 2018-05-09 PROCEDURE — 85240 CLOT FACTOR VIII AHG 1 STAGE: CPT

## 2018-05-09 PROCEDURE — 85025 COMPLETE CBC W/AUTO DIFF WBC: CPT

## 2018-05-10 ENCOUNTER — TELEPHONE (OUTPATIENT)
Dept: MEDICAL GROUP | Facility: MEDICAL CENTER | Age: 58
End: 2018-05-10

## 2018-05-10 DIAGNOSIS — R79.89 ELEVATED PLATELET COUNT: ICD-10-CM

## 2018-05-10 DIAGNOSIS — R23.3 EASY BRUISING: ICD-10-CM

## 2018-05-10 LAB
EST. AVERAGE GLUCOSE BLD GHB EST-MCNC: 126 MG/DL
HBA1C MFR BLD: 6 % (ref 0–5.6)

## 2018-05-10 NOTE — TELEPHONE ENCOUNTER
Please call Natalie and let her know  Her labs show just slightly elevated platelet count ( which we use for clotting)  And typically easy bruising or bleeding is due to low platelet count.  It could be her thin skin on her hands that are a factor in her easy bruising.  I will refer her to Hematology for a consult to see if there is any other reason  But not to worry.    Her Vit D level is low = 23, so I want to make sure she is taking   Vit D(Cholecalciferol 4,000) units per day.    Thanks  Doug

## 2018-05-12 LAB
FACT VIII ACT/NOR PPP: 123 % (ref 56–191)
VWF AG ACT/NOR PPP IA: 128 % (ref 52–214)
VWF:RCO ACT/NOR PPP PL AGG: 74 % (ref 51–215)

## 2018-05-16 ENCOUNTER — HOSPITAL ENCOUNTER (OUTPATIENT)
Facility: MEDICAL CENTER | Age: 58
End: 2018-05-16
Attending: INTERNAL MEDICINE
Payer: MEDICAID

## 2018-05-16 ENCOUNTER — OFFICE VISIT (OUTPATIENT)
Dept: MEDICAL GROUP | Facility: MEDICAL CENTER | Age: 58
End: 2018-05-16
Attending: NURSE PRACTITIONER
Payer: MEDICAID

## 2018-05-16 ENCOUNTER — NON-PROVIDER VISIT (OUTPATIENT)
Dept: HEMATOLOGY ONCOLOGY | Facility: MEDICAL CENTER | Age: 58
End: 2018-05-16
Payer: MEDICAID

## 2018-05-16 ENCOUNTER — OFFICE VISIT (OUTPATIENT)
Dept: HEMATOLOGY ONCOLOGY | Facility: MEDICAL CENTER | Age: 58
End: 2018-05-16
Payer: MEDICAID

## 2018-05-16 VITALS
HEIGHT: 61 IN | WEIGHT: 240.69 LBS | RESPIRATION RATE: 18 BRPM | HEART RATE: 76 BPM | OXYGEN SATURATION: 94 % | DIASTOLIC BLOOD PRESSURE: 76 MMHG | TEMPERATURE: 97.4 F | BODY MASS INDEX: 45.44 KG/M2 | SYSTOLIC BLOOD PRESSURE: 122 MMHG

## 2018-05-16 VITALS
DIASTOLIC BLOOD PRESSURE: 82 MMHG | HEART RATE: 80 BPM | WEIGHT: 242.84 LBS | OXYGEN SATURATION: 95 % | SYSTOLIC BLOOD PRESSURE: 130 MMHG | HEIGHT: 61 IN | TEMPERATURE: 97.5 F | RESPIRATION RATE: 18 BRPM | BODY MASS INDEX: 45.85 KG/M2

## 2018-05-16 VITALS
DIASTOLIC BLOOD PRESSURE: 82 MMHG | HEIGHT: 61 IN | OXYGEN SATURATION: 95 % | WEIGHT: 242.84 LBS | BODY MASS INDEX: 45.85 KG/M2 | RESPIRATION RATE: 18 BRPM | HEART RATE: 80 BPM | SYSTOLIC BLOOD PRESSURE: 130 MMHG | TEMPERATURE: 97.5 F

## 2018-05-16 DIAGNOSIS — D75.839 THROMBOCYTOSIS: ICD-10-CM

## 2018-05-16 DIAGNOSIS — I10 ESSENTIAL HYPERTENSION: ICD-10-CM

## 2018-05-16 DIAGNOSIS — D75.839 THROMBOCYTOSIS: Primary | ICD-10-CM

## 2018-05-16 DIAGNOSIS — Z12.11 SCREEN FOR COLON CANCER: ICD-10-CM

## 2018-05-16 DIAGNOSIS — E55.9 VITAMIN D DEFICIENCY: ICD-10-CM

## 2018-05-16 DIAGNOSIS — E11.9 TYPE 2 DIABETES MELLITUS WITHOUT COMPLICATION, WITHOUT LONG-TERM CURRENT USE OF INSULIN (HCC): ICD-10-CM

## 2018-05-16 DIAGNOSIS — R23.3 EASY BRUISING: ICD-10-CM

## 2018-05-16 DIAGNOSIS — N39.3 STRESS INCONTINENCE OF URINE: ICD-10-CM

## 2018-05-16 PROCEDURE — 99212 OFFICE O/P EST SF 10 MIN: CPT | Performed by: NURSE PRACTITIONER

## 2018-05-16 PROCEDURE — 81270 JAK2 GENE: CPT

## 2018-05-16 PROCEDURE — 99213 OFFICE O/P EST LOW 20 MIN: CPT | Performed by: NURSE PRACTITIONER

## 2018-05-16 PROCEDURE — 99204 OFFICE O/P NEW MOD 45 MIN: CPT | Performed by: INTERNAL MEDICINE

## 2018-05-16 PROCEDURE — 36415 COLL VENOUS BLD VENIPUNCTURE: CPT | Performed by: INTERNAL MEDICINE

## 2018-05-16 RX ORDER — LORATADINE 10 MG/1
10 TABLET ORAL DAILY
COMMUNITY
End: 2019-03-05

## 2018-05-16 ASSESSMENT — PAIN SCALES - GENERAL
PAINLEVEL: NO PAIN
PAINLEVEL: NO PAIN

## 2018-05-16 NOTE — ASSESSMENT & PLAN NOTE
We reviewed her recent labs which showed her fasting blood sugar at 112 and her A1c at 6.0 which is an average of about 126.  I congratulated her on her good glucose control.  She is to continue her good diet and medications.

## 2018-05-16 NOTE — ASSESSMENT & PLAN NOTE
Pt reports has leaking of urine often with laughing and has tried Kegel exercises.  Has been using vagifem and did not help.  Pt reports leaks urine a few times every day, but worse at night.

## 2018-05-16 NOTE — ASSESSMENT & PLAN NOTE
Hx of vit d deficiency,  On 5/10/18 after review of below labs I sent RX for Vit D 4, 000 units per day  Recent labs show low Vit D= 23.  Pt Reports she has started the Vitamin D daily.

## 2018-05-16 NOTE — PROGRESS NOTES
Chief Complaint:   Chief Complaint   Patient presents with   • Results     labs       HPI:  Natalie is here today for a follow-up on easy bruising/labs    Her PMH includes:  Situational Stress  Asthma  HTN  Hyperlipidemia  Obesity  Vitamin D Deficiency  DM-2  Right Knee DJD  Splenectomy  Tonsillectomy    Cholecystectomy  Chronic Hoarse Throat  FIT Stool TEST positive (2017 and 10/2017)  Left calf strain  Easy bruising to hands  Slightly elevated Platelets       Established with    Orthopedics- Dr Ospina ([facility])  [facility]     Nev  Report:  3/20/18 Tramadol 50 mg # 14 by DR Ospina  6 RX and 2 Prescriber's in report  ---------------------------------------------------------------     Review of Records shows:  Colonoscopy on 18-- no Results available    18 Labs- CBC normal except Platelets elevated= 462, PT and PTT normal, A1C= 6.0, Fasting BS= 112, otherwise CMP normal, lipids normal,                       Vit B12 normal, TSH= 2.070, Vit D= 23 (low)-----> Telephone Encounter to Patient and Referral to Hematology.  18 Clinic visit w concern about easy hand bruising, elevated platelet count hx. Need for new Glucometer  ---> labs ordered.    4/3/18 Telephone Encounter to Natalie w normal Pap smear results.    Vitamin D deficiency  Hx of vit d deficiency,  On 5/10/18 after review of below labs I sent RX for Vit D 4, 000 units per day  Recent labs show low Vit D= 23.  Pt Reports she has started the Vitamin D daily.    Easy bruising  We reviewed her labs including normal PT and PTT, slightly elevated Platelet count.  Has appt w Hemtologist today.  Reports no further bruising since last visit here.      Type 2 diabetes mellitus without complication  We reviewed her recent labs which showed her fasting blood sugar at 112 and her A1c at 6.0 which is an average of about 126.  I congratulated her on her good glucose control.  She is to continue her good diet and medications.    Essential  hypertension  /76 elevated in clinic today. Known hx of HTN.  Pt stating that they have been taking their medication as prescribed without adverse effect. Pt denies HA, vision changes, neurologic deficits, CP, SOB.     Stress incontinence of urine  Pt reports has leaking of urine often with laughing and has tried Kegel exercises.  Has been using vagifem and did not help.  Pt reports leaks urine a few times every day, but worse at night.  Asking for Referral to Urology.      Patient Active Problem List    Diagnosis Date Noted   • Thrombocytosis ([facility]) 05/16/2018   • Easy bruising 05/08/2018   • Encounter for routine gynecological examination with Papanicolaou smear of cervix 03/28/2018   • Laceration of left index finger 03/12/2018   • Routine adult health maintenance 03/12/2018   • Cough 12/19/2017   • Stress incontinence of urine 11/29/2017   • Burn, thumb, first degree, left, initial encounter 11/13/2017   • Right calf pain 11/01/2017   • Fecal occult blood test positive 10/04/2017   • Injury of right lower extremity 08/22/2017   • Environmental allergies 03/30/2017   • Morbid obesity with BMI of 45.0-49.9, adult ([facility]) 02/16/2017   • Mood changes ([facility]) 12/13/2016   • Hoarseness, persistent 07/07/2016   • Insomnia 07/07/2016   • Hyperlipidemia 06/09/2016   • Osteoarthritis of knee 04/14/2016   • Vision problems 03/24/2016   • Right knee pain 03/24/2016   • Vitamin D deficiency 11/12/2015   • Type 2 diabetes mellitus without complication ([facility]) 11/12/2015   • Essential hypertension 10/22/2015   • Moderate persistent asthma 10/22/2015   • Episodic headache 10/22/2015   • H/O splenectomy 10/22/2015       Allergies:Voltaren [diclofenac-disod edta]    Medicines as of today:  Current Outpatient Prescriptions   Medication Sig Dispense Refill   • loratadine (CLARITIN) 10 MG Tab Take 10 mg by mouth every day.     • Blood Glucose Monitoring Suppl (TRUE METRIX AIR GLUCOSE METER) Device 1 Each by Does not apply route every  day. 1 Device 0   • glucose blood (TRUE METRIX BLOOD GLUCOSE TEST) strip 1 Strip by Other route as needed. Test daily fasting in am and if symptoms 50 Strip 5   • Lancets Misc Lancets for use with glucometer, daily and if symptoms 100 Each 5   • valsartan (DIOVAN) 160 MG Tab TAKE 1 TABLET ORALLY ONCE DAILY 30 Tab 2   • carBAMazepine (TEGRETOL) 200 MG Tab TAKE 1 TABLET ORALLY IN THE MORNING AND TAKE 2 TABLETS ORALLY IN THE EVENING 90 Tab 2   • glipiZIDE (GLUCOTROL) 5 MG Tab TAKE 1 TABLET ORALLY 2 TIMES DAILY 60 Tab 1   • atorvastatin (LIPITOR) 40 MG Tab TAKE 1 TABLET ORALLY ONCE DAILY 30 Tab 11   • amlodipine (NORVASC) 10 MG Tab TAKE 1 TABLET ORALLY ONCE DAILY 30 Tab 4   • hydrOXYzine HCl (ATARAX) 25 MG Tab TAKE 1 TABLET ORALLY NIGHTLY AT BEDTIME IF NEEDED FOR ANXIETY/INSOMNIA 30 Tab 5   • albuterol (PROVENTIL) 2.5mg/3ml Nebu Soln solution for nebulization 3 mL by Nebulization route every four hours as needed for Shortness of Breath. 75 mL 3   • montelukast (SINGULAIR) 10 MG Tab TAKE 1 TABLET ORALLY ONCE DAILY 30 Tab 3   • estradiol (VAGIFEM) 10 MCG Tab Insert 1 Tab in vagina every 3 days. 8 Tab 2   • montelukast (SINGULAIR) 10 MG Tab TAKE 1 TABLET ORALLY ONCE DAILY 30 Tab 2   • ipratropium (ATROVENT) 17 MCG/ACT Aero Soln Inhale 2 Puffs by mouth every 6 hours. 17 g 5   • albuterol 108 (90 Base) MCG/ACT Aero Soln inhalation aerosol Inhale 2 Puffs by mouth every 6 hours as needed for Shortness of Breath. 8.5 g 5   • Cholecalciferol 4000 units Cap Take 1 Capsule by mouth every day. 30 Cap 5   • hydrocodone-acetaminophen (NORCO) 5-325 MG Tab per tablet Take 1 Tab by mouth every 8 hours as needed. 21 Tab 0   • glucose blood (TRUE METRIX BLOOD GLUCOSE TEST) strip 1 Strip by Other route as needed. 50 Strip 2   • Blood Glucose Monitoring Suppl (TRUE METRIX AIR GLUCOSE METER) Device 1 Each by Does not apply route every day. 1 Device 0   • TECHLITE LANCETS Misc      • TRUE METRIX BLOOD GLUCOSE TEST strip FINGER STICK BLOOD  "SUGAR ONCE DAILY FASTING AND IF SYMPTOMATIC 50 Strip 11   • LUMIGAN 0.01 % Solution 1 Drop every bedtime.     • meclizine (ANTIVERT) 25 MG Tab Take 1 Tab by mouth 2 times a day as needed. 30 Tab 1     No current facility-administered medications for this visit.        Social History   Substance Use Topics   • Smoking status: Former Smoker     Years: 5.00   • Smokeless tobacco: Never Used   • Alcohol use No       Past Medical History:   Diagnosis Date   • Asthma    • Diabetes (HCC)    • Hypertension    • Spherocytosis (familial) (HCC)        Family History   Problem Relation Age of Onset   • Diabetes Mother    • Heart Disease Mother        ROS:  Review of Systems   See HPI Above    Exam:  Blood pressure 122/76, pulse 76, temperature 36.3 °C (97.4 °F), resp. rate 18, height 1.549 m (5' 1\"), weight 109.2 kg (240 lb 11 oz), SpO2 94 %. Body mass index is 45.48 kg/m².    General:  Well nourished, over-weight, well developed female in NAD  HENT:Head is grossly normal. PERRL.  Neck: Supple. Trachea is midline.  Pulmonary: Speaks in full sentences easily.  Normal effort.  Cardiovascular: Regular rate and rhythm.  Abdomen-Abdomen is soft  Upper extremities-  Good ROM  Lower extremities- neg for edema, redness  Neuro- A & O x 4. Speech clear and appropriate.    Current medications, allergies, and problem list reviewed with patient and [facility] today.    Assessment/Plan:  1. Vitamin D deficiency  Continue Vitamin D as discussed   2. Easy bruising  Keep appt w Hematology today to discuss.   3. Type 2 diabetes mellitus without complication, without long-term current use of insulin (HCC)  Continue current medication and diet regimen as well controlled currently   4. Screen for colon cancer  OCCULT BLOOD FECES IMMUNOASSAY (FIT)   5. Essential hypertension  Continue meds as per usual   6. Stress incontinence of urine  REFERRAL TO UROLOGY       Return if symptoms worsen or fail to improve.  "

## 2018-05-16 NOTE — PROGRESS NOTES
Natalie Eugene is a 58 y.o. female here for a non-provider visit for: Lab Draws  on 5/16/2018 at 2:39 PM    Procedure Performed: Venipuncture     Anatomical site: Right Antecubital Area (AC)    Equipment used: 21g    Labs drawn: KRISTIN 2 mutation    Ordering Provider: Dr. ROBB Mckeon    Palu By: Cassandra Suarez, [facility]  No complications and Dr. Mckeon was present in the office the entire time I was doing the patients blood draw.

## 2018-05-16 NOTE — PROGRESS NOTES
Consult Note: Hematology    Date of consultation: 5/16/2018     Referring provider: Tan Bellamy A.P.*    Reason for consultation:   CC: Easy bruising and thrombocytosis    History of presenting illness:   First seen in on 5/16/2018  Natalie Eugene  is a 58 y.o. year old female . Seen in clinic today because of easy bruising for the past 3 months. Patient states that she does a lot of heavy lifting of crates and boxes and has recently noted that she has been bruising easier with on her hands. She had blood work done at the primary care physician's office which was essentially negative for any bleeding diathesis however she wanted a 2nd opinion. Hematology clinic  Patient is also noted to have slightly elevated platelet counts. Patient denies any history of blood clots      Review of Systems:  Constitutional: No fever, chills, weight loss ,malaise/fatigue.      All other review of systems are negative except what was mentioned above in the HPI.    Past Medical History:    Past Medical History:   Diagnosis Date   • Asthma    • Diabetes (HCC)    • Hypertension    • Spherocytosis (familial) (HCC)        Past surgical history:    Past Surgical History:   Procedure Laterality Date   • CHOLECYSTECTOMY  1995   • SPLENECTOMY  1994   • PRIMARY C SECTION  1987   • TONSILLECTOMY  1965       Allergies:  Voltaren [diclofenac-disod edta]    Medications:    Current Outpatient Prescriptions   Medication Sig Dispense Refill   • loratadine (CLARITIN) 10 MG Tab Take 10 mg by mouth every day.     • carBAMazepine (TEGRETOL) 200 MG Tab TAKE 1 TABLET ORALLY IN THE MORNING AND TAKE 2 TABLETS ORALLY IN THE EVENING 90 Tab 2   • glipiZIDE (GLUCOTROL) 5 MG Tab TAKE 1 TABLET ORALLY 2 TIMES DAILY 60 Tab 1   • atorvastatin (LIPITOR) 40 MG Tab TAKE 1 TABLET ORALLY ONCE DAILY 30 Tab 11   • estradiol (VAGIFEM) 10 MCG Tab Insert 1 Tab in vagina every 3 days. 8 Tab 2   • Cholecalciferol 4000 units Cap Take 1 Capsule by mouth every day. 30 Cap  5   • hydrocodone-acetaminophen (NORCO) 5-325 MG Tab per tablet Take 1 Tab by mouth every 8 hours as needed. 21 Tab 0   • Blood Glucose Monitoring Suppl (TRUE METRIX AIR GLUCOSE METER) Device 1 Each by Does not apply route every day. 1 Device 0   • glucose blood (TRUE METRIX BLOOD GLUCOSE TEST) strip 1 Strip by Other route as needed. Test daily fasting in am and if symptoms 50 Strip 5   • Lancets Misc Lancets for use with glucometer, daily and if symptoms 100 Each 5   • valsartan (DIOVAN) 160 MG Tab TAKE 1 TABLET ORALLY ONCE DAILY 30 Tab 2   • amlodipine (NORVASC) 10 MG Tab TAKE 1 TABLET ORALLY ONCE DAILY 30 Tab 4   • hydrOXYzine HCl (ATARAX) 25 MG Tab TAKE 1 TABLET ORALLY NIGHTLY AT BEDTIME IF NEEDED FOR ANXIETY/INSOMNIA 30 Tab 5   • albuterol (PROVENTIL) 2.5mg/3ml Nebu Soln solution for nebulization 3 mL by Nebulization route every four hours as needed for Shortness of Breath. 75 mL 3   • montelukast (SINGULAIR) 10 MG Tab TAKE 1 TABLET ORALLY ONCE DAILY 30 Tab 3   • montelukast (SINGULAIR) 10 MG Tab TAKE 1 TABLET ORALLY ONCE DAILY 30 Tab 2   • ipratropium (ATROVENT) 17 MCG/ACT Aero Soln Inhale 2 Puffs by mouth every 6 hours. 17 g 5   • albuterol 108 (90 Base) MCG/ACT Aero Soln inhalation aerosol Inhale 2 Puffs by mouth every 6 hours as needed for Shortness of Breath. 8.5 g 5   • glucose blood (TRUE METRIX BLOOD GLUCOSE TEST) strip 1 Strip by Other route as needed. 50 Strip 2   • Blood Glucose Monitoring Suppl (TRUE METRIX AIR GLUCOSE METER) Device 1 Each by Does not apply route every day. 1 Device 0   • TECHLITE LANCETS Misc      • TRUE METRIX BLOOD GLUCOSE TEST strip FINGER STICK BLOOD SUGAR ONCE DAILY FASTING AND IF SYMPTOMATIC 50 Strip 11   • LUMIGAN 0.01 % Solution 1 Drop every bedtime.     • meclizine (ANTIVERT) 25 MG Tab Take 1 Tab by mouth 2 times a day as needed. 30 Tab 1     No current facility-administered medications for this visit.        Social History:     Social History     Social History   •  "Marital status: Single     Spouse name: N/A   • Number of children: N/A   • Years of education: N/A     Occupational History   •  Other     Social History Main Topics   • Smoking status: Former Smoker     Years: 5.00   • Smokeless tobacco: Never Used   • Alcohol use No   • Drug use: No   • Sexual activity: Yes     Partners: Female     Other Topics Concern   • Not on file     Social History Narrative   • No narrative on file       Family History:     Family History   Problem Relation Age of Onset   • Diabetes Mother    • Heart Disease Mother        Physical Exam:  Vitals:   /82   Pulse 80   Temp 36.4 °C (97.5 °F)   Resp 18   Ht 1.549 m (5' 1\")   Wt 110.2 kg (242 lb 13.4 oz)   SpO2 95%   BMI 45.88 kg/m²     General: Not in acute distress,   HEENT: No pallor, icterus. Oropharynx clear.   Neck: Supple, no palpable masses.  Lymph nodes: No palpable cervical, supraclavicular, axillary or inguinal lymphadenopathy.    CVS: regular rate and rhythm, no rubs or gallops  RESP: Clear to auscultate bilaterally, no wheezing or crackles.   ABD: Soft, non tender, non distended, positive bowel sounds, no palpable organomegaly  EXT: No edema or cyanosis  CNS: Alert and oriented x3, No focal deficits.  Skin- No rash      Labs:   Results for YANIQUE COLE (MRN [id number]) as of 5/16/2018 14:26   10/24/2015 10:13 11/27/2017 09:36 5/9/2018 10:54   WBC 9.1 8.4 9.1   RBC 5.25 5.09 4.65   Hemoglobin 16.0 16.0 14.7   Hematocrit 48.5 (H) 46.6 43.6   MCV 92.4 91.6 93.8   MCH 30.5 31.4 31.6   MCHC 33.0 (L) 34.3 33.7   RDW 43.7 41.1 41.6   Platelet Count 428 455 (H) 462 (H)   MPV 9.6 9.3 9.0   Neutrophils-Polys 69.10 61.40 65.90   Neutrophils (Absolute) 6.29 5.17 5.96   Lymphocytes 17.80 (L) 25.30 22.10   Lymphs (Absolute) 1.62 2.13 2.00   Monocytes 9.80 8.90 8.40   Monos (Absolute) 0.89 (H) 0.75 0.76   Eosinophils 2.20 3.00 2.50   Eos (Absolute) 0.20 0.25 0.23   Basophils 0.80 1.00 0.80   Baso (Absolute) 0.07 0.08 0.07   Immature " Granulocytes 0.30 0.40 0.30   Immature Granulocytes (abs) 0.03 0.03 0.03   Nucleated RBC 0.00 0.00 0.00   NRBC (Absolute) 0.00 0.00 0.00       Imaging:    Assessment and Plan:  -Thrombocytosis  -Secondary to splenectomy  -We'll check check to mutation  -Patient had a splenectomy because of hereditary spherocytosis    -Easy bruising   -labs reviewed showed normal coag, normal factor VIII and von Willebrand level  -A suspected secondary to thinning of her skin with age      She agreed and verbalized her agreement and understanding with the current plan.  I answered all questions and concerns she has at this time.  Dear  Tan Bellamy A.P.*,  Thank you for allowing me to participate in her care.    All labs and/or imaging studies mentioned in the note above were reviewed with and explained to the patient as a pertain to medical decision making.    Please note that this dictation was created using voice recognition software. I have made every reasonable attempt to correct obvious errors, but I expect that there are errors of grammar and possibly content that I did not discover before finalizing the note.      SIGNATURES:  Tonny Mckeon    CC:  ALIA KrishnaN.  Tan Bellamy A.P.*

## 2018-05-16 NOTE — ASSESSMENT & PLAN NOTE
We reviewed her labs including normal PT and PTT, slightly elevated Platelet count.    Has appt w Hemtologist today.

## 2018-05-16 NOTE — ASSESSMENT & PLAN NOTE
/76 elevated in clinic today. Known hx of HTN.  Pt stating that they have been taking their medication as prescribed without adverse effect. Pt denies HA, vision changes, neurologic deficits, CP, SOB.

## 2018-05-17 RX ORDER — GLIPIZIDE 5 MG/1
TABLET ORAL
Qty: 60 TAB | Refills: 0 | Status: SHIPPED | OUTPATIENT
Start: 2018-05-17 | End: 2018-06-18 | Stop reason: SDUPTHER

## 2018-05-17 RX ORDER — MONTELUKAST SODIUM 10 MG/1
TABLET ORAL
Qty: 30 TAB | Refills: 3 | Status: SHIPPED | OUTPATIENT
Start: 2018-05-17 | End: 2018-09-18 | Stop reason: SDUPTHER

## 2018-05-17 RX ORDER — CHOLECALCIFEROL (VITAMIN D3) 50 MCG
CAPSULE ORAL
Qty: 60 CAP | Refills: 4 | Status: SHIPPED | OUTPATIENT
Start: 2018-05-17 | End: 2020-05-19

## 2018-05-17 NOTE — TELEPHONE ENCOUNTER
Was the patient seen in the last year in this department? Yes     Does patient have an active prescription for medications requested? No     Received Request Via: Pharmacy   Future Appointments       Provider Department [place]    8/16/2018 1:00 PM Tonny Mckeon M.D. [facility]

## 2018-05-21 LAB — JAK2 P.V617F MUT/NOR BLD/T: 0 %

## 2018-05-30 DIAGNOSIS — Z91.09 ENVIRONMENTAL ALLERGIES: ICD-10-CM

## 2018-05-30 RX ORDER — FLUTICASONE PROPIONATE 50 UG/1
SPRAY, METERED NASAL
Qty: 1 BOTTLE | Refills: 2 | Status: SHIPPED | OUTPATIENT
Start: 2018-05-30 | End: 2020-03-06

## 2018-05-30 NOTE — TELEPHONE ENCOUNTER
Was the patient seen in the last year in this department? Yes     Does patient have an active prescription for medications requested? no    Received Request Via: Pharmacy

## 2018-06-05 ENCOUNTER — OFFICE VISIT (OUTPATIENT)
Dept: MEDICAL GROUP | Facility: MEDICAL CENTER | Age: 58
End: 2018-06-05
Attending: NURSE PRACTITIONER
Payer: MEDICAID

## 2018-06-05 VITALS
WEIGHT: 244 LBS | SYSTOLIC BLOOD PRESSURE: 140 MMHG | BODY MASS INDEX: 46.07 KG/M2 | HEIGHT: 61 IN | DIASTOLIC BLOOD PRESSURE: 80 MMHG | TEMPERATURE: 99.8 F | OXYGEN SATURATION: 91 %

## 2018-06-05 DIAGNOSIS — R23.2 HOT FLASHES: ICD-10-CM

## 2018-06-05 DIAGNOSIS — R23.3 EASY BRUISING: ICD-10-CM

## 2018-06-05 DIAGNOSIS — Z91.09 ENVIRONMENTAL ALLERGIES: ICD-10-CM

## 2018-06-05 DIAGNOSIS — E66.01 MORBID OBESITY WITH BMI OF 45.0-49.9, ADULT (HCC): ICD-10-CM

## 2018-06-05 DIAGNOSIS — Z78.0 POST-MENOPAUSAL: ICD-10-CM

## 2018-06-05 PROCEDURE — 99213 OFFICE O/P EST LOW 20 MIN: CPT | Performed by: NURSE PRACTITIONER

## 2018-06-05 PROCEDURE — 99214 OFFICE O/P EST MOD 30 MIN: CPT | Performed by: NURSE PRACTITIONER

## 2018-06-05 RX ORDER — PAROXETINE 10 MG/1
10 TABLET, FILM COATED ORAL DAILY
Qty: 30 TAB | Refills: 2 | Status: SHIPPED | OUTPATIENT
Start: 2018-06-05 | End: 2018-07-03 | Stop reason: SINTOL

## 2018-06-05 NOTE — ASSESSMENT & PLAN NOTE
Pt saw Hematologist and thought her issue was due to her splenectomy  Has easy bruising on hands with thin skin.  Dr Valladares not concerned.

## 2018-06-05 NOTE — ASSESSMENT & PLAN NOTE
Pt reports is taking otc Estorvan and continues to have hot flashes.  LMP 7 years ago.  Unsure why she is having hot flashes.   No night sweats.   Typically occurs at 7 pm and then in middle of night.   Throws off covers due to heat.

## 2018-06-05 NOTE — PROGRESS NOTES
Chief Complaint:   Chief Complaint   Patient presents with   • Follow-Up     hot flashes        HPI:  Natalie is here today for Hot Flashes    PMH includes:  Situational Stress  Asthma  HTN  Hyperlipidemia  Obesity  Vitamin D Deficiency  DM-2  Right Knee DJD  Splenectomy  Tonsillectomy    Cholecystectomy  Chronic Hoarse Throat  FIT Stool TEST positive (2017 and 10/2017)  Left calf strain  Easy bruising to hands  Slightly elevated Platelets        Established with    Orthopedics- Dr Ospina ([facility])  [facility]     [place]  Report:  3/20/18 Tramadol 50 mg # 14 by DR Ospina  6 RX and 2 Prescriber's in report  ---------------------------------------------------------------     Review of Records shows:  Colonoscopy on 18-- no Results available     18 Labs- CBC normal except Platelets elevated= 462, PT and PTT normal, A1C= 6.0, Fasting BS= 112, otherwise CMP normal, lipids normal,                       Vit B12 normal, TSH= 2.070, Vit D= 23 (low)-----> Telephone Encounter to Patient and Referral to Hematology.  18 Clinic visit w concern about easy hand bruising, elevated platelet count hx. Need for new Glucometer  ---> labs ordered.     4/3/18 Telephone Encounter to Natalie w normal Pap smear results.      Hot flashes  Pt reports is taking otc Estorvan and continues to have hot flashes.  LMP 7 years ago.  Unsure why she is having hot flashes.   No night sweats.   Typically occurs at 7 pm and then in middle of night.   Throws off covers due to heat.      Environmental allergies  Hx of Allergies. Is taking Singulair but not Claritin.  Is using flonase the past week and not helping yet.  Pt reports her allergies are mild.   Has a lot of trees around blooming and causes allergy symptoms, mild  And also grass cuttings.      Easy bruising  Pt saw Hematologist and thought her issue was due to her splenectomy  Has easy bruising on hands with thin skin.  Dr Valladares not concerned.     Morbid obesity with BMI  of 45.0-49.9, adult ([facility])  Pt is overweight and has not been eating well.  Discussed cut back on sugar/carbs.      Patient Active Problem List    Diagnosis Date Noted   • Hot flashes 06/05/2018   • Thrombocytosis ([facility]) 05/16/2018   • Easy bruising 05/08/2018   • Encounter for routine gynecological examination with Papanicolaou smear of cervix 03/28/2018   • Laceration of left index finger 03/12/2018   • Routine adult health maintenance 03/12/2018   • Cough 12/19/2017   • Stress incontinence of urine 11/29/2017   • Burn, thumb, first degree, left, initial encounter 11/13/2017   • Right calf pain 11/01/2017   • Fecal occult blood test positive 10/04/2017   • Injury of right lower extremity 08/22/2017   • Environmental allergies 03/30/2017   • Morbid obesity with BMI of 45.0-49.9, adult ([facility]) 02/16/2017   • Mood changes ([facility]) 12/13/2016   • Hoarseness, persistent 07/07/2016   • Insomnia 07/07/2016   • Hyperlipidemia 06/09/2016   • Osteoarthritis of knee 04/14/2016   • Vision problems 03/24/2016   • Right knee pain 03/24/2016   • Vitamin D deficiency 11/12/2015   • Type 2 diabetes mellitus without complication ([facility]) 11/12/2015   • Essential hypertension 10/22/2015   • Moderate persistent asthma 10/22/2015   • Episodic headache 10/22/2015   • H/O splenectomy 10/22/2015       Allergies:Voltaren [diclofenac-disod edta]    Medicines as of today:  Current Outpatient Prescriptions   Medication Sig Dispense Refill   • PARoxetine (PAXIL) 10 MG Tab Take 1 Tab by mouth every day. 30 Tab 2   • FLONASE ALLERGY RELIEF 50 MCG/ACT nasal spray SPRAY 1 SPRAY IN EACH NOSTRIL ONCE DAILY *60 DAY SUPPLY* 1 Bottle 2   • montelukast (SINGULAIR) 10 MG Tab TAKE 1 TABLET ORALLY ONCE DAILY 30 Tab 3   • glipiZIDE (GLUCOTROL) 5 MG Tab TAKE 1 TABLET ORALLY 2 TIMES DAILY 60 Tab 0   • D3 SUPER STRENGTH 2000 units Cap TAKE 2 CAPSULES ORALLY (4000 UN) ONCE DAILY 60 Cap 4   • loratadine (CLARITIN) 10 MG Tab Take 10 mg by mouth every day.     • Blood  Glucose Monitoring Suppl (TRUE METRIX AIR GLUCOSE METER) Device 1 Each by Does not apply route every day. 1 Device 0   • glucose blood (TRUE METRIX BLOOD GLUCOSE TEST) strip 1 Strip by Other route as needed. Test daily fasting in am and if symptoms 50 Strip 5   • Lancets Misc Lancets for use with glucometer, daily and if symptoms 100 Each 5   • valsartan (DIOVAN) 160 MG Tab TAKE 1 TABLET ORALLY ONCE DAILY 30 Tab 2   • carBAMazepine (TEGRETOL) 200 MG Tab TAKE 1 TABLET ORALLY IN THE MORNING AND TAKE 2 TABLETS ORALLY IN THE EVENING 90 Tab 2   • atorvastatin (LIPITOR) 40 MG Tab TAKE 1 TABLET ORALLY ONCE DAILY 30 Tab 11   • amlodipine (NORVASC) 10 MG Tab TAKE 1 TABLET ORALLY ONCE DAILY 30 Tab 4   • hydrOXYzine HCl (ATARAX) 25 MG Tab TAKE 1 TABLET ORALLY NIGHTLY AT BEDTIME IF NEEDED FOR ANXIETY/INSOMNIA 30 Tab 5   • albuterol (PROVENTIL) 2.5mg/3ml Nebu Soln solution for nebulization 3 mL by Nebulization route every four hours as needed for Shortness of Breath. 75 mL 3   • estradiol (VAGIFEM) 10 MCG Tab Insert 1 Tab in vagina every 3 days. 8 Tab 2   • montelukast (SINGULAIR) 10 MG Tab TAKE 1 TABLET ORALLY ONCE DAILY 30 Tab 2   • ipratropium (ATROVENT) 17 MCG/ACT Aero Soln Inhale 2 Puffs by mouth every 6 hours. 17 g 5   • albuterol 108 (90 Base) MCG/ACT Aero Soln inhalation aerosol Inhale 2 Puffs by mouth every 6 hours as needed for Shortness of Breath. 8.5 g 5   • hydrocodone-acetaminophen (NORCO) 5-325 MG Tab per tablet Take 1 Tab by mouth every 8 hours as needed. 21 Tab 0   • glucose blood (TRUE METRIX BLOOD GLUCOSE TEST) strip 1 Strip by Other route as needed. 50 Strip 2   • Blood Glucose Monitoring Suppl (TRUE METRIX AIR GLUCOSE METER) Device 1 Each by Does not apply route every day. 1 Device 0   • TECHLITE LANCETS Misc      • TRUE METRIX BLOOD GLUCOSE TEST strip FINGER STICK BLOOD SUGAR ONCE DAILY FASTING AND IF SYMPTOMATIC 50 Strip 11   • LUMIGAN 0.01 % Solution 1 Drop every bedtime.     • meclizine (ANTIVERT) 25 MG  "Tab Take 1 Tab by mouth 2 times a day as needed. 30 Tab 1     No current facility-administered medications for this visit.        Social History   Substance Use Topics   • Smoking status: Former Smoker     Years: 5.00   • Smokeless tobacco: Never Used   • Alcohol use Not on file       Past Medical History:   Diagnosis Date   • Asthma    • Diabetes ([facility])    • Hypertension    • Spherocytosis (familial) ([facility])        Family History   Problem Relation Age of Onset   • Diabetes Mother    • Heart Disease Mother        ROS:  Review of Systems   See HPI Above    Exam:  Blood pressure 140/80, temperature 37.7 °C (99.8 °F), height 1.549 m (5' 0.98\"), weight 110.7 kg (244 lb), SpO2 91 %. Body mass index is 46.13 kg/m².    General:  Well nourished, over-weight, well developed female in NAD  HENT:Head is grossly normal. PERRL.  Neck: Supple. Trachea is midline.  Pulmonary: Speaks in full sentences w ease. Normal effort.  Cardiovascular: Regular rate and rhythm.  Abdomen-Abdomen is soft, No tenderness.  Upper extremities- Strong = . Good ROM  Lower extremities- neg for edema, redness, tenderness.  Neuro- A & O x 4. Speech clear and appropriate.    Current medications, allergies, and problem list reviewed with patient and updated in [facility] today.    Assessment/Plan:  1. Hot flashes  REFERRAL TO GYNECOLOGY    PARoxetine (PAXIL) 10 MG Tab   2. Post-menopausal  REFERRAL TO GYNECOLOGY   3. Environmental allergies  Restart Claritin, Continue Flonase nasal spray and Singulair. Call/return if not improving.   4. Easy bruising  Pt to trial light gloves when working w hands.   5. Morbid obesity with BMI of 45.0-49.9, adult ([facility])  Pt to cut back on sugar and carbs as discussed       Return in about 4 weeks (around 7/3/2018).  "

## 2018-06-05 NOTE — ASSESSMENT & PLAN NOTE
Hx of Allergies. Is taking Singulair but not Claritin.  Is using flonase the past week and not helping yet.  Pt reports her allergies are mild.   Has a lot of trees around blooming and causes allergy symptoms, mild  And also grass cuttings.

## 2018-06-18 RX ORDER — GLIPIZIDE 5 MG/1
TABLET ORAL
Qty: 60 TAB | Refills: 2 | Status: SHIPPED | OUTPATIENT
Start: 2018-06-18 | End: 2018-09-18 | Stop reason: SDUPTHER

## 2018-06-18 RX ORDER — CARBAMAZEPINE 200 MG/1
TABLET ORAL
Qty: 90 TAB | Refills: 2 | Status: SHIPPED | OUTPATIENT
Start: 2018-06-18 | End: 2018-10-16 | Stop reason: SDUPTHER

## 2018-06-18 RX ORDER — AMLODIPINE BESYLATE 10 MG/1
TABLET ORAL
Qty: 30 TAB | Refills: 2 | Status: SHIPPED | OUTPATIENT
Start: 2018-06-18 | End: 2018-09-18 | Stop reason: SDUPTHER

## 2018-06-18 RX ORDER — HYDROXYZINE HYDROCHLORIDE 25 MG/1
TABLET, FILM COATED ORAL
Qty: 30 TAB | Refills: 2 | Status: SHIPPED | OUTPATIENT
Start: 2018-06-18 | End: 2018-09-18 | Stop reason: SDUPTHER

## 2018-06-22 ENCOUNTER — OFFICE VISIT (OUTPATIENT)
Dept: MEDICAL GROUP | Facility: MEDICAL CENTER | Age: 58
End: 2018-06-22
Attending: FAMILY MEDICINE
Payer: MEDICAID

## 2018-06-22 VITALS
TEMPERATURE: 97.5 F | HEART RATE: 96 BPM | RESPIRATION RATE: 16 BRPM | OXYGEN SATURATION: 92 % | HEIGHT: 61 IN | SYSTOLIC BLOOD PRESSURE: 126 MMHG | BODY MASS INDEX: 45.31 KG/M2 | WEIGHT: 240 LBS | DIASTOLIC BLOOD PRESSURE: 80 MMHG

## 2018-06-22 DIAGNOSIS — L30.9 DERMATITIS: ICD-10-CM

## 2018-06-22 PROCEDURE — 99212 OFFICE O/P EST SF 10 MIN: CPT | Performed by: FAMILY MEDICINE

## 2018-06-22 PROCEDURE — 99213 OFFICE O/P EST LOW 20 MIN: CPT | Performed by: FAMILY MEDICINE

## 2018-06-22 RX ORDER — TRIAMCINOLONE ACETONIDE 1 MG/G
1 CREAM TOPICAL 2 TIMES DAILY
Qty: 30 G | Refills: 0 | Status: SHIPPED | OUTPATIENT
Start: 2018-06-22 | End: 2018-10-23

## 2018-06-22 RX ORDER — CEPHALEXIN 500 MG/1
500 CAPSULE ORAL 3 TIMES DAILY
Qty: 15 CAP | Refills: 0 | Status: SHIPPED | OUTPATIENT
Start: 2018-06-22 | End: 2018-10-23

## 2018-06-22 ASSESSMENT — PAIN SCALES - GENERAL: PAINLEVEL: 2=MINIMAL-SLIGHT

## 2018-06-22 NOTE — PROGRESS NOTES
"Subjective:      Natalie Eugene is a 58 y.o. female who presents with Wound Infection (pt was scratched by a frineds dog 10 dyas ago, redness, swelling since yesterday)            1. 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 There was no bite or attack involved. She noted that the 2 punctures didn't bleed and she washed them with soap and water. She has been applying Neosporin to that area on her left mid forearm since the original injury. She noted onset of a very pruritic flat rash in that area yesterday and presents today for evaluation. There is no similar rash elsewhere.    ROS negative for focal numbness, fever, arm pain       Objective:     /80   Pulse 96   Temp 36.4 °C (97.5 °F)   Resp 16   Ht 1.549 m (5' 0.98\")   Wt 108.9 kg (240 lb)   SpO2 92%   BMI 45.37 kg/m²      Physical Exam  General-alert cooperative female in no acute distress  Left upper extremity-2 crusted puncture wounds are noted approximately 14 mm apart in the midportion of the extensor surface of the left forearm. The area between those 2 puncture wounds and extending out for a total of about 2 2-1/2 cm is notable for a coalescent pink maculopapular rash. There may be slight swelling over the distal forearm as well.          Assessment/Plan:     1. Dermatitis-uncertain whether this represents a sensitivity reaction to the topical Neosporin or possibly a delayed infection    Plan: 1. Keflex 500 mg 3 times a day ×5 days  2. Transabdominal on 0.1% ointment may be applied thinly twice daily to the reddened area for up to 10 days  3. Follow-up as needed    "

## 2018-07-03 ENCOUNTER — OFFICE VISIT (OUTPATIENT)
Dept: MEDICAL GROUP | Facility: MEDICAL CENTER | Age: 58
End: 2018-07-03
Attending: NURSE PRACTITIONER
Payer: MEDICAID

## 2018-07-03 VITALS
WEIGHT: 244 LBS | TEMPERATURE: 98.6 F | HEIGHT: 61 IN | RESPIRATION RATE: 20 BRPM | OXYGEN SATURATION: 91 % | DIASTOLIC BLOOD PRESSURE: 70 MMHG | HEART RATE: 100 BPM | BODY MASS INDEX: 46.07 KG/M2 | SYSTOLIC BLOOD PRESSURE: 122 MMHG

## 2018-07-03 DIAGNOSIS — Z51.89 VISIT FOR WOUND CHECK: ICD-10-CM

## 2018-07-03 DIAGNOSIS — R23.2 HOT FLASHES: ICD-10-CM

## 2018-07-03 DIAGNOSIS — J45.901 MILD ASTHMA WITH EXACERBATION, UNSPECIFIED WHETHER PERSISTENT: ICD-10-CM

## 2018-07-03 PROCEDURE — 99213 OFFICE O/P EST LOW 20 MIN: CPT | Performed by: NURSE PRACTITIONER

## 2018-07-03 RX ORDER — FLUOXETINE HYDROCHLORIDE 20 MG/1
20 CAPSULE ORAL DAILY
Qty: 30 CAP | Refills: 2 | Status: SHIPPED | OUTPATIENT
Start: 2018-07-03 | End: 2018-07-31

## 2018-07-03 NOTE — ASSESSMENT & PLAN NOTE
"As above - Reports dog scratches are better.  Pt did use topical kenalog cream and Keflex RX  States is \"all better\".   "

## 2018-07-03 NOTE — PROGRESS NOTES
"Chief Complaint:   Chief Complaint   Patient presents with   • Follow-Up     medication management        HPI:  Natalie is here today for a follow-up on skin wound/infection, hot flashes    PMH includes:  Situational Stress  Asthma  HTN  Hyperlipidemia  Obesity  Vitamin D Deficiency  DM-2  Right Knee DJD  Splenectomy  Tonsillectomy    Cholecystectomy  Chronic Hoarse Throat  FIT Stool TEST positive (2017 and 10/2017)  Left calf strain  Easy bruising to hands  Slightly elevated Platelets        Established with    Orthopedics- Dr Ospina ([facility])  [facility]    Recent Referral Approved;  GYN- DR Errol Lema  Report:  3/20/18 Tramadol 50 mg # 14 by DR Ospina  6 RX and 2 Prescriber's in report  ---------------------------------------------------------------     Review of Records shows:  18 Clinic Visit w Dr Louise for left arm dog scratches/wound infection. RX for Keflex, Kenalog cream. To f/u PCP.    18 Clinic visit for Hot Flashes post menopausal. Referred to GYN, RX for trial of Paxil.     Colonoscopy on 18-- no Results available     18 Labs- CBC normal except Platelets elevated= 462, PT and PTT normal, A1C= 6.0, Fasting BS= 112, otherwise CMP normal, lipids normal,                       Vit B12 normal, TSH= 2.070, Vit D= 23 (low)-----> Telephone Encounter to Patient and Referral to Hematology.  18 Clinic visit w concern about easy hand bruising, elevated platelet count hx. Need for new Glucometer  ---> labs ordered.    Hot flashes  Pt reports no change in hot flashes w use of Paxil.  Makes her feel \"blah\"  Discussed change to Prozac as trial and pt to call   GYN for appt.    Visit for wound check  As above - Reports dog scratches are better.  Pt did use topical kenalog cream and Keflex RX  States is \"all better\".       Patient Active Problem List    Diagnosis Date Noted   • Visit for wound check 2018   • Hot flashes 2018   • Thrombocytosis (HCC) " 05/16/2018   • Easy bruising 05/08/2018   • Encounter for routine gynecological examination with Papanicolaou smear of cervix 03/28/2018   • Laceration of left index finger 03/12/2018   • Routine adult health maintenance 03/12/2018   • Cough 12/19/2017   • Stress incontinence of urine 11/29/2017   • Burn, thumb, first degree, left, initial encounter 11/13/2017   • Right calf pain 11/01/2017   • Fecal occult blood test positive 10/04/2017   • Injury of right lower extremity 08/22/2017   • Environmental allergies 03/30/2017   • Morbid obesity with BMI of 45.0-49.9, adult ([facility]) 02/16/2017   • Mood changes ([facility]) 12/13/2016   • Hoarseness, persistent 07/07/2016   • Insomnia 07/07/2016   • Hyperlipidemia 06/09/2016   • Osteoarthritis of knee 04/14/2016   • Vision problems 03/24/2016   • Right knee pain 03/24/2016   • Vitamin D deficiency 11/12/2015   • Type 2 diabetes mellitus without complication ([facility]) 11/12/2015   • Essential hypertension 10/22/2015   • Moderate persistent asthma 10/22/2015   • Episodic headache 10/22/2015   • H/O splenectomy 10/22/2015       Allergies:Voltaren [diclofenac-disod edta]    Medicines as of today:  Current Outpatient Prescriptions   Medication Sig Dispense Refill   • ipratropium (ATROVENT) 17 MCG/ACT Aero Soln Inhale 2 Puffs by mouth every 6 hours. 17 g 5   • FLUoxetine (PROZAC) 20 MG Cap Take 1 Cap by mouth every day. 30 Cap 2   • triamcinolone acetonide (KENALOG) 0.1 % Cream Apply 1 Application to affected area(s) 2 times a day. 30 g 0   • glipiZIDE (GLUCOTROL) 5 MG Tab TAKE 1 TABLET ORALLY 2 TIMES DAILY 60 Tab 2   • amLODIPine (NORVASC) 10 MG Tab TAKE 1 TABLET ORALLY ONCE DAILY 30 Tab 2   • hydrOXYzine HCl (ATARAX) 25 MG Tab TAKE 1 TABLET ORALLY NIGHTLY AT BEDTIME IF NEEDED FOR ANXIETY/INSOMNIA 30 Tab 2   • carBAMazepine (TEGRETOL) 200 MG Tab TAKE 1 TABLET ORALLY IN THE MORNING AND TAKE 2 TABLETS ORALLY IN THE EVENING 90 Tab 2   • FLONASE ALLERGY RELIEF 50 MCG/ACT nasal spray SPRAY 1  SPRAY IN EACH NOSTRIL ONCE DAILY *60 DAY SUPPLY* 1 Bottle 2   • montelukast (SINGULAIR) 10 MG Tab TAKE 1 TABLET ORALLY ONCE DAILY 30 Tab 3   • D3 SUPER STRENGTH 2000 units Cap TAKE 2 CAPSULES ORALLY (4000 UN) ONCE DAILY 60 Cap 4   • loratadine (CLARITIN) 10 MG Tab Take 10 mg by mouth every day.     • Blood Glucose Monitoring Suppl (TRUE METRIX AIR GLUCOSE METER) Device 1 Each by Does not apply route every day. 1 Device 0   • glucose blood (TRUE METRIX BLOOD GLUCOSE TEST) strip 1 Strip by Other route as needed. Test daily fasting in am and if symptoms 50 Strip 5   • valsartan (DIOVAN) 160 MG Tab TAKE 1 TABLET ORALLY ONCE DAILY 30 Tab 2   • atorvastatin (LIPITOR) 40 MG Tab TAKE 1 TABLET ORALLY ONCE DAILY 30 Tab 11   • albuterol (PROVENTIL) 2.5mg/3ml Nebu Soln solution for nebulization 3 mL by Nebulization route every four hours as needed for Shortness of Breath. 75 mL 3   • albuterol 108 (90 Base) MCG/ACT Aero Soln inhalation aerosol Inhale 2 Puffs by mouth every 6 hours as needed for Shortness of Breath. 8.5 g 5   • hydrocodone-acetaminophen (NORCO) 5-325 MG Tab per tablet Take 1 Tab by mouth every 8 hours as needed. 21 Tab 0   • TECHLITE LANCETS Misc      • LUMIGAN 0.01 % Solution 1 Drop every bedtime.     • cephALEXin (KEFLEX) 500 MG Cap Take 1 Cap by mouth 3 times a day. 15 Cap 0   • Lancets Misc Lancets for use with glucometer, daily and if symptoms 100 Each 5   • estradiol (VAGIFEM) 10 MCG Tab Insert 1 Tab in vagina every 3 days. 8 Tab 2   • montelukast (SINGULAIR) 10 MG Tab TAKE 1 TABLET ORALLY ONCE DAILY 30 Tab 2   • glucose blood (TRUE METRIX BLOOD GLUCOSE TEST) strip 1 Strip by Other route as needed. 50 Strip 2   • Blood Glucose Monitoring Suppl (TRUE METRIX AIR GLUCOSE METER) Device 1 Each by Does not apply route every day. 1 Device 0   • TRUE METRIX BLOOD GLUCOSE TEST strip FINGER STICK BLOOD SUGAR ONCE DAILY FASTING AND IF SYMPTOMATIC 50 Strip 11   • meclizine (ANTIVERT) 25 MG Tab Take 1 Tab by mouth 2  "times a day as needed. (Patient not taking: Reported on 7/3/2018) 30 Tab 1     No current facility-administered medications for this visit.        Social History   Substance Use Topics   • Smoking status: Former Smoker     Years: 5.00   • Smokeless tobacco: Never Used   • Alcohol use Not on file       Past Medical History:   Diagnosis Date   • Asthma    • Diabetes (HCC)    • Hypertension    • Spherocytosis (familial) (HCC)        Family History   Problem Relation Age of Onset   • Diabetes Mother    • Heart Disease Mother        ROS:  Review of Systems   See HPI Above    Exam:  Blood pressure 122/70, pulse 100, temperature 37 °C (98.6 °F), resp. rate 20, height 1.549 m (5' 0.98\"), weight 110.7 kg (244 lb), SpO2 91 %. Body mass index is 46.13 kg/m².    General:  Well nourished, over-weight,  well developed female in NAD  HENT:Head is grossly normal. PERRL.  Neck: Supple. Trachea is midline.  Pulmonary:speaks in full sentences easily. Normal effort.   Cardiovascular: Regular rate and rhythm.  Abdomen-Abdomen is soft  Upper extremities-  Good ROM, small abrasion to right hand.   Lower extremities- neg for edema, redness  Neuro- A & O x 4. Speech clear and appropriate.    Current medications, allergies, and problem list reviewed with patient and updated in [facility] today.    Assessment/Plan:  1. Mild asthma with exacerbation, unspecified whether persistent  ipratropium (ATROVENT) 17 MCG/ACT Aero Soln   2. Hot flashes  FLUoxetine (PROZAC) 20 MG Cap  Keep GYN consult appt set w Dr Errol Jordan   On 10/1/18.  Return/Call if not improving w med change for alternative treatment.   3. Visit for wound check  Pt to keep hands and arms clean.        Return if symptoms worsen or fail to improve.  "

## 2018-07-03 NOTE — ASSESSMENT & PLAN NOTE
"Pt reports no change in hot flashes w use of Paxil.  Makes her feel \"blah\"  Discussed change to Prozac as trial and pt to call   GYN for appt.  "

## 2018-07-10 ENCOUNTER — TELEPHONE (OUTPATIENT)
Dept: MEDICAL GROUP | Facility: MEDICAL CENTER | Age: 58
End: 2018-07-10

## 2018-07-10 DIAGNOSIS — R23.3 BRUISING, SPONTANEOUS: ICD-10-CM

## 2018-07-10 NOTE — TELEPHONE ENCOUNTER
Please call Natalie and let her know  1. For the low Blood sugar issues, she should back off slightly   On her DM med- Glipizide and make sure she eats small snacks that contain protein.  2. I just ordered repeat labs for CBC, PT, PTT ( though they were okay in May) to recheck these.( test orders will be in any [facility] computer, no fasting needed)  3. She should call the Hematology office she recently saw and report that her bruising is worse  And request another visit.     [facility]   Phone: [phone]    Thanks  Doug

## 2018-07-10 NOTE — TELEPHONE ENCOUNTER
"1. Caller Name: Natalie                                         Call Back Number: [phone] (home)         Patient approves a detailed voicemail message: yes    Patient called and wanted to report that she has two episodes of hypoglycemia in the middle of the night. Patient said that her sugar dropped into the 50's. Patient also wanted to know if you can order labs on her, to check if there is something wrong with her \"blood\" as she is bruising a lot. Please advise     "

## 2018-07-16 ENCOUNTER — HOSPITAL ENCOUNTER (OUTPATIENT)
Dept: LAB | Facility: MEDICAL CENTER | Age: 58
End: 2018-07-16
Attending: NURSE PRACTITIONER
Payer: MEDICAID

## 2018-07-16 DIAGNOSIS — R23.3 BRUISING, SPONTANEOUS: ICD-10-CM

## 2018-07-16 LAB
APTT PPP: 24.5 SEC (ref 24.7–36)
BASOPHILS # BLD AUTO: 1 % (ref 0–1.8)
BASOPHILS # BLD: 0.13 K/UL (ref 0–0.12)
EOSINOPHIL # BLD AUTO: 0.22 K/UL (ref 0–0.51)
EOSINOPHIL NFR BLD: 1.6 % (ref 0–6.9)
ERYTHROCYTE [DISTWIDTH] IN BLOOD BY AUTOMATED COUNT: 43.8 FL (ref 35.9–50)
HCT VFR BLD AUTO: 46.3 % (ref 37–47)
HGB BLD-MCNC: 15.1 G/DL (ref 12–16)
IMM GRANULOCYTES # BLD AUTO: 0.04 K/UL (ref 0–0.11)
IMM GRANULOCYTES NFR BLD AUTO: 0.3 % (ref 0–0.9)
INR PPP: 1.09 (ref 0.87–1.13)
LYMPHOCYTES # BLD AUTO: 2.9 K/UL (ref 1–4.8)
LYMPHOCYTES NFR BLD: 21.7 % (ref 22–41)
MCH RBC QN AUTO: 30.9 PG (ref 27–33)
MCHC RBC AUTO-ENTMCNC: 32.6 G/DL (ref 33.6–35)
MCV RBC AUTO: 94.7 FL (ref 81.4–97.8)
MONOCYTES # BLD AUTO: 1.33 K/UL (ref 0–0.85)
MONOCYTES NFR BLD AUTO: 10 % (ref 0–13.4)
NEUTROPHILS # BLD AUTO: 8.72 K/UL (ref 2–7.15)
NEUTROPHILS NFR BLD: 65.4 % (ref 44–72)
NRBC # BLD AUTO: 0 K/UL
NRBC BLD-RTO: 0 /100 WBC
PLATELET # BLD AUTO: 487 K/UL (ref 164–446)
PMV BLD AUTO: 9.2 FL (ref 9–12.9)
PROTHROMBIN TIME: 13.8 SEC (ref 12–14.6)
RBC # BLD AUTO: 4.89 M/UL (ref 4.2–5.4)
WBC # BLD AUTO: 13.3 K/UL (ref 4.8–10.8)

## 2018-07-16 PROCEDURE — 85730 THROMBOPLASTIN TIME PARTIAL: CPT

## 2018-07-16 PROCEDURE — 36415 COLL VENOUS BLD VENIPUNCTURE: CPT

## 2018-07-16 PROCEDURE — 85025 COMPLETE CBC W/AUTO DIFF WBC: CPT

## 2018-07-16 PROCEDURE — 85610 PROTHROMBIN TIME: CPT

## 2018-07-31 ENCOUNTER — OFFICE VISIT (OUTPATIENT)
Dept: MEDICAL GROUP | Facility: MEDICAL CENTER | Age: 58
End: 2018-07-31
Attending: NURSE PRACTITIONER
Payer: MEDICAID

## 2018-07-31 VITALS
WEIGHT: 235 LBS | SYSTOLIC BLOOD PRESSURE: 130 MMHG | RESPIRATION RATE: 20 BRPM | HEART RATE: 92 BPM | DIASTOLIC BLOOD PRESSURE: 80 MMHG | HEIGHT: 61 IN | OXYGEN SATURATION: 95 % | BODY MASS INDEX: 44.37 KG/M2 | TEMPERATURE: 98.3 F

## 2018-07-31 DIAGNOSIS — Z13.21 ENCOUNTER FOR VITAMIN DEFICIENCY SCREENING: ICD-10-CM

## 2018-07-31 DIAGNOSIS — E66.01 MORBID OBESITY WITH BMI OF 45.0-49.9, ADULT (HCC): ICD-10-CM

## 2018-07-31 DIAGNOSIS — E11.9 TYPE 2 DIABETES MELLITUS WITHOUT COMPLICATION, WITHOUT LONG-TERM CURRENT USE OF INSULIN (HCC): ICD-10-CM

## 2018-07-31 DIAGNOSIS — R23.2 HOT FLASHES: ICD-10-CM

## 2018-07-31 PROCEDURE — 99213 OFFICE O/P EST LOW 20 MIN: CPT | Performed by: NURSE PRACTITIONER

## 2018-07-31 RX ORDER — VENLAFAXINE HYDROCHLORIDE 37.5 MG/1
37.5 CAPSULE, EXTENDED RELEASE ORAL DAILY
Qty: 30 CAP | Refills: 3 | Status: SHIPPED | OUTPATIENT
Start: 2018-07-31 | End: 2018-12-10 | Stop reason: SDUPTHER

## 2018-07-31 NOTE — ASSESSMENT & PLAN NOTE
"Reports Prozac \"helped my nails grow\" and did not feel  \"blah\" like she did w Paxil, but reports hot flashes continue as before.  Discussed trial of Venlafaxine as it is an SNRI known to   Decrease vasomotor symptoms for some women.  Discussed alternative in future as Gabapentin.  Pt still has appt w GYN in October but is going to  Call and see if she can get on cancellation list  For earlier appt.    "

## 2018-07-31 NOTE — ASSESSMENT & PLAN NOTE
Pt reports is wanting A1C recheck as hoping  To turn from pre-DM to non.  Is working on less carbs.

## 2018-07-31 NOTE — ASSESSMENT & PLAN NOTE
Patient is over-weight and has been working on cutting back on   Carbs/sugar. Wt atvof=156 compared to 6/22/18 240 lbs.  Recommend she have small amt of protein/fat w each meal  And limit carbs.

## 2018-07-31 NOTE — PROGRESS NOTES
Chief Complaint:   Chief Complaint   Patient presents with   • Results     labs   • Other     hot flashes        HPI:  Natalie is here today for a follow-up on lab results/bruising, hot flashes    PMH includes:  Situational Stress  Asthma  HTN  Hyperlipidemia  Obesity  Vitamin D Deficiency  DM-2  Right Knee DJD  Splenectomy  Tonsillectomy    Cholecystectomy  Chronic Hoarse Throat  FIT Stool TEST positive (2017 and 10/2017)  Left calf strain  Easy bruising to hands  Slightly elevated Platelets        Established with    Orthopedics- Dr Ospina ([facility])  [facility]     Recent Referral Approved;  GYN- DR Errol Lema  Report:  3/20/18 Tramadol 50 mg # 14 by DR Ospina  6 RX and 2 Prescriber's in report  ---------------------------------------------------------------     Review of Records shows:  18 Labs CBC normal , Platelets slightly elevated = 487, PT, PTT okay.  7/10/18 Telephone Encounter from Natalie minor concern about low blood sugar and bruising. Labs ordered.   7/3/18 Clinic for F/u on skin wound, hot flashes. Rx changed from Paxil to Prozac, To see GYN (Appt set for 10/1/18)  18 Clinic Visit w Dr Louise for left arm dog scratches/wound infection. RX for Keflex, Kenalog cream. To f/u PCP.     18 Clinic visit for Hot Flashes post menopausal. Referred to GYN, RX for trial of Paxil.      Colonoscopy on 18-- no Results available     18 Labs- CBC normal except Platelets elevated= 462, PT and PTT normal, A1C= 6.0, Fasting BS= 112, otherwise CMP normal, lipids normal,                       Vit B12 normal, TSH= 2.070, Vit D= 23 (low)-----> Telephone Encounter to Patient and Referral to Hematology.  18 Clinic visit w concern about easy hand bruising, elevated platelet count hx. Need for new Glucometer  ---> labs ordered.       Morbid obesity with BMI of 45.0-49.9, adult (HCC)  Patient is over-weight and has been working on cutting back on   Carbs/sugar. Wt vkicl=328  "compared to 6/22/18 240 lbs.  Recommend she have small amt of protein/fat w each meal  And limit carbs.    Hot flashes  Reports Prozac \"helped my nails grow\" and did not feel  \"blah\" like she did w Paxil, but reports hot flashes continue as before.  Discussed trial of Venlafaxine as it is an SNRI known to   Decrease vasomotor symptoms for some women.  Discussed alternative in future as Gabapentin.  Pt still has appt w GYN in October but is going to  Call and see if she can get on cancellation list  For earlier appt.    Diabetes/Pre- DM  Pt reports is wanting A1C recheck as hoping  To turn from pre-DM to non.  Is working on less carbs.  Discussed she needs to eat some protein and fat w each meal.        Patient Active Problem List    Diagnosis Date Noted   • Visit for wound check 07/03/2018   • Hot flashes 06/05/2018   • Thrombocytosis ([facility]) 05/16/2018   • Easy bruising 05/08/2018   • Encounter for routine gynecological examination with Papanicolaou smear of cervix 03/28/2018   • Laceration of left index finger 03/12/2018   • Routine adult health maintenance 03/12/2018   • Cough 12/19/2017   • Stress incontinence of urine 11/29/2017   • Burn, thumb, first degree, left, initial encounter 11/13/2017   • Right calf pain 11/01/2017   • Fecal occult blood test positive 10/04/2017   • Injury of right lower extremity 08/22/2017   • Environmental allergies 03/30/2017   • Morbid obesity with BMI of 45.0-49.9, adult ([facility]) 02/16/2017   • Mood changes ([facility]) 12/13/2016   • Hoarseness, persistent 07/07/2016   • Insomnia 07/07/2016   • Hyperlipidemia 06/09/2016   • Osteoarthritis of knee 04/14/2016   • Vision problems 03/24/2016   • Right knee pain 03/24/2016   • Vitamin D deficiency 11/12/2015   • Type 2 diabetes mellitus without complication ([facility]) 11/12/2015   • Essential hypertension 10/22/2015   • Moderate persistent asthma 10/22/2015   • Episodic headache 10/22/2015   • H/O splenectomy 10/22/2015       Allergies:Voltaren " [diclofenac-disod edta]    Medicines as of today:  Current Outpatient Prescriptions   Medication Sig Dispense Refill   • venlafaxine XR (EFFEXOR XR) 37.5 MG CAPSULE SR 24 HR Take 1 Cap by mouth every day. 30 Cap 3   • valsartan (DIOVAN) 160 MG Tab TAKE 1 TABLET ORALLY ONCE DAILY 30 Tab 2   • ipratropium (ATROVENT) 17 MCG/ACT Aero Soln Inhale 2 Puffs by mouth every 6 hours. 17 g 5   • cephALEXin (KEFLEX) 500 MG Cap Take 1 Cap by mouth 3 times a day. 15 Cap 0   • triamcinolone acetonide (KENALOG) 0.1 % Cream Apply 1 Application to affected area(s) 2 times a day. 30 g 0   • glipiZIDE (GLUCOTROL) 5 MG Tab TAKE 1 TABLET ORALLY 2 TIMES DAILY 60 Tab 2   • amLODIPine (NORVASC) 10 MG Tab TAKE 1 TABLET ORALLY ONCE DAILY 30 Tab 2   • hydrOXYzine HCl (ATARAX) 25 MG Tab TAKE 1 TABLET ORALLY NIGHTLY AT BEDTIME IF NEEDED FOR ANXIETY/INSOMNIA 30 Tab 2   • carBAMazepine (TEGRETOL) 200 MG Tab TAKE 1 TABLET ORALLY IN THE MORNING AND TAKE 2 TABLETS ORALLY IN THE EVENING 90 Tab 2   • FLONASE ALLERGY RELIEF 50 MCG/ACT nasal spray SPRAY 1 SPRAY IN EACH NOSTRIL ONCE DAILY *60 DAY SUPPLY* 1 Bottle 2   • montelukast (SINGULAIR) 10 MG Tab TAKE 1 TABLET ORALLY ONCE DAILY 30 Tab 3   • D3 SUPER STRENGTH 2000 units Cap TAKE 2 CAPSULES ORALLY (4000 UN) ONCE DAILY 60 Cap 4   • loratadine (CLARITIN) 10 MG Tab Take 10 mg by mouth every day.     • Blood Glucose Monitoring Suppl (TRUE METRIX AIR GLUCOSE METER) Device 1 Each by Does not apply route every day. 1 Device 0   • glucose blood (TRUE METRIX BLOOD GLUCOSE TEST) strip 1 Strip by Other route as needed. Test daily fasting in am and if symptoms 50 Strip 5   • Lancets Misc Lancets for use with glucometer, daily and if symptoms 100 Each 5   • atorvastatin (LIPITOR) 40 MG Tab TAKE 1 TABLET ORALLY ONCE DAILY 30 Tab 11   • albuterol (PROVENTIL) 2.5mg/3ml Nebu Soln solution for nebulization 3 mL by Nebulization route every four hours as needed for Shortness of Breath. 75 mL 3   • estradiol (VAGIFEM) 10  "MCG Tab Insert 1 Tab in vagina every 3 days. 8 Tab 2   • montelukast (SINGULAIR) 10 MG Tab TAKE 1 TABLET ORALLY ONCE DAILY 30 Tab 2   • albuterol 108 (90 Base) MCG/ACT Aero Soln inhalation aerosol Inhale 2 Puffs by mouth every 6 hours as needed for Shortness of Breath. 8.5 g 5   • hydrocodone-acetaminophen (NORCO) 5-325 MG Tab per tablet Take 1 Tab by mouth every 8 hours as needed. 21 Tab 0   • glucose blood (TRUE METRIX BLOOD GLUCOSE TEST) strip 1 Strip by Other route as needed. 50 Strip 2   • Blood Glucose Monitoring Suppl (TRUE METRIX AIR GLUCOSE METER) Device 1 Each by Does not apply route every day. 1 Device 0   • TECHLITE LANCETS Misc      • TRUE METRIX BLOOD GLUCOSE TEST strip FINGER STICK BLOOD SUGAR ONCE DAILY FASTING AND IF SYMPTOMATIC 50 Strip 11   • LUMIGAN 0.01 % Solution 1 Drop every bedtime.     • meclizine (ANTIVERT) 25 MG Tab Take 1 Tab by mouth 2 times a day as needed. (Patient not taking: Reported on 7/3/2018) 30 Tab 1     No current facility-administered medications for this visit.        Social History   Substance Use Topics   • Smoking status: Former Smoker     Years: 5.00   • Smokeless tobacco: Never Used   • Alcohol use Not on file       Past Medical History:   Diagnosis Date   • Asthma    • Diabetes (HCC)    • Hypertension    • Spherocytosis (familial) (HCC)        Family History   Problem Relation Age of Onset   • Diabetes Mother    • Heart Disease Mother        ROS:  Review of Systems   See HPI Above    Exam:  Blood pressure 130/80, pulse 92, temperature 36.8 °C (98.3 °F), resp. rate 20, height 1.549 m (5' 0.98\"), weight 106.6 kg (235 lb), SpO2 95 %. Body mass index is 44.43 kg/m².    General:  Well nourished, over-weight,  well developed female in NAD  HENT:Head is grossly normal. PERRL.  Neck: Supple. Trachea is midline.  Pulmonary:Speaks in full sentences easily. Normal effort.    Cardiovascular: Regular rate and rhythm.  Abdomen-Abdomen is soft.  Upper extremities- WNL. Good ROM  Lower " extremities- neg for edema  Neuro- A & O x 4. Speech clear and appropriate.    Current medications, allergies, and problem list reviewed with patient and updated in [facility] today.    Assessment/Plan:  1. Type 2 diabetes mellitus without complication, without long-term current use of insulin ([facility])  HEMOGLOBIN A1C  Continue reduction of sugar/carbs in diet.    BASIC METABOLIC PANEL   2. Hot flashes  venlafaxine XR (EFFEXOR XR) 37.5 MG CAPSULE SR 24 HR-START  Stop Prozac. Keep appt w GYN in October, and work on getting on cancellation list for earlier apppt.  Future- Consider Gabapentin for hot flash symptoms.   3. Encounter for vitamin deficiency screening  VITAMIN D,25 HYDROXY    VITAMIN B12   4. Morbid obesity with BMI of 45.0-49.9, adult ([facility])  Continue reduction of sugar/carbs in diet.       Return in about 4 weeks (around 8/28/2018) for recheck on hot flashes..

## 2018-08-15 ENCOUNTER — TELEPHONE (OUTPATIENT)
Dept: HEMATOLOGY ONCOLOGY | Facility: MEDICAL CENTER | Age: 58
End: 2018-08-15

## 2018-08-15 NOTE — TELEPHONE ENCOUNTER
"Patient called stating she needed to reschedule her follow up appointment with Dr. Mckeon on 08/16/18 as she \"cannot make it\". Patient did not wish to reschedule with Dr. Mckeon as he is leaving the practice. Patient rescheduled with Dr. De La Paz on 08/28/18.   "

## 2018-08-16 ENCOUNTER — APPOINTMENT (OUTPATIENT)
Dept: HEMATOLOGY ONCOLOGY | Facility: MEDICAL CENTER | Age: 58
End: 2018-08-16
Payer: MEDICAID

## 2018-08-28 ENCOUNTER — OFFICE VISIT (OUTPATIENT)
Dept: HEMATOLOGY ONCOLOGY | Facility: MEDICAL CENTER | Age: 58
End: 2018-08-28
Payer: MEDICAID

## 2018-08-28 VITALS
BODY MASS INDEX: 46.59 KG/M2 | OXYGEN SATURATION: 92 % | SYSTOLIC BLOOD PRESSURE: 173 MMHG | WEIGHT: 237.32 LBS | RESPIRATION RATE: 18 BRPM | TEMPERATURE: 98.6 F | HEART RATE: 88 BPM | DIASTOLIC BLOOD PRESSURE: 82 MMHG | HEIGHT: 60 IN

## 2018-08-28 DIAGNOSIS — D75.839 THROMBOCYTOSIS: ICD-10-CM

## 2018-08-28 DIAGNOSIS — D58.0 SPHEROCYTOSIS (FAMILIAL) (HCC): ICD-10-CM

## 2018-08-28 PROCEDURE — 99214 OFFICE O/P EST MOD 30 MIN: CPT | Performed by: INTERNAL MEDICINE

## 2018-08-28 ASSESSMENT — PAIN SCALES - GENERAL: PAINLEVEL: NO PAIN

## 2018-08-28 NOTE — PROGRESS NOTES
Date of visit: 8/28/2018  4:17 PM      Chief Complaint- Easy bruising     Identification/Prior relevant history: History of hereditary spherocytosis. Status post splenectomy many years ago.  Presented with easy bruising.  Seen by Dr Mckeon  Interim history- coagulation parameters were unremarkable. Von Willebrand screen within normal limits. She actually had mildly elevated platelet count on presentation. Most recently CBC later came back showing normal platelet count of 446. She continues to have easy bruising without no major hematoma. No hemarthrosis or internal bleeding. She has been on SSRIs for quite some time.    Past Medical History:      Past Medical History:   Diagnosis Date   • Asthma    • Diabetes (HCC)    • Hypertension    • Spherocytosis (familial) (HCC)    • Spherocytosis (familial) (HCC) 8/28/2018       Past surgical history:       Past Surgical History:   Procedure Laterality Date   • CHOLECYSTECTOMY  1995   • SPLENECTOMY  1994   • PRIMARY C SECTION  1987   • TONSILLECTOMY  1965       Allergies:       Voltaren [diclofenac-disod edta]    Medications:         Current Outpatient Prescriptions   Medication Sig Dispense Refill   • venlafaxine XR (EFFEXOR XR) 37.5 MG CAPSULE SR 24 HR Take 1 Cap by mouth every day. 30 Cap 3   • valsartan (DIOVAN) 160 MG Tab TAKE 1 TABLET ORALLY ONCE DAILY 30 Tab 2   • ipratropium (ATROVENT) 17 MCG/ACT Aero Soln Inhale 2 Puffs by mouth every 6 hours. 17 g 5   • triamcinolone acetonide (KENALOG) 0.1 % Cream Apply 1 Application to affected area(s) 2 times a day. 30 g 0   • glipiZIDE (GLUCOTROL) 5 MG Tab TAKE 1 TABLET ORALLY 2 TIMES DAILY 60 Tab 2   • amLODIPine (NORVASC) 10 MG Tab TAKE 1 TABLET ORALLY ONCE DAILY 30 Tab 2   • hydrOXYzine HCl (ATARAX) 25 MG Tab TAKE 1 TABLET ORALLY NIGHTLY AT BEDTIME IF NEEDED FOR ANXIETY/INSOMNIA 30 Tab 2   • carBAMazepine (TEGRETOL) 200 MG Tab TAKE 1 TABLET ORALLY IN THE MORNING AND TAKE 2 TABLETS ORALLY IN THE EVENING 90 Tab 2   • FLONASE  ALLERGY RELIEF 50 MCG/ACT nasal spray SPRAY 1 SPRAY IN EACH NOSTRIL ONCE DAILY *60 DAY SUPPLY* 1 Bottle 2   • montelukast (SINGULAIR) 10 MG Tab TAKE 1 TABLET ORALLY ONCE DAILY 30 Tab 3   • D3 SUPER STRENGTH 2000 units Cap TAKE 2 CAPSULES ORALLY (4000 UN) ONCE DAILY 60 Cap 4   • loratadine (CLARITIN) 10 MG Tab Take 10 mg by mouth every day.     • Blood Glucose Monitoring Suppl (TRUE METRIX AIR GLUCOSE METER) Device 1 Each by Does not apply route every day. 1 Device 0   • glucose blood (TRUE METRIX BLOOD GLUCOSE TEST) strip 1 Strip by Other route as needed. Test daily fasting in am and if symptoms 50 Strip 5   • Lancets Misc Lancets for use with glucometer, daily and if symptoms 100 Each 5   • atorvastatin (LIPITOR) 40 MG Tab TAKE 1 TABLET ORALLY ONCE DAILY 30 Tab 11   • albuterol (PROVENTIL) 2.5mg/3ml Nebu Soln solution for nebulization 3 mL by Nebulization route every four hours as needed for Shortness of Breath. 75 mL 3   • estradiol (VAGIFEM) 10 MCG Tab Insert 1 Tab in vagina every 3 days. 8 Tab 2   • montelukast (SINGULAIR) 10 MG Tab TAKE 1 TABLET ORALLY ONCE DAILY 30 Tab 2   • albuterol 108 (90 Base) MCG/ACT Aero Soln inhalation aerosol Inhale 2 Puffs by mouth every 6 hours as needed for Shortness of Breath. 8.5 g 5   • hydrocodone-acetaminophen (NORCO) 5-325 MG Tab per tablet Take 1 Tab by mouth every 8 hours as needed. 21 Tab 0   • glucose blood (TRUE METRIX BLOOD GLUCOSE TEST) strip 1 Strip by Other route as needed. 50 Strip 2   • TECHLITE LANCETS Misc      • TRUE METRIX BLOOD GLUCOSE TEST strip FINGER STICK BLOOD SUGAR ONCE DAILY FASTING AND IF SYMPTOMATIC 50 Strip 11   • LUMIGAN 0.01 % Solution 1 Drop every bedtime.     • meclizine (ANTIVERT) 25 MG Tab Take 1 Tab by mouth 2 times a day as needed. 30 Tab 1   • cephALEXin (KEFLEX) 500 MG Cap Take 1 Cap by mouth 3 times a day. 15 Cap 0   • Blood Glucose Monitoring Suppl (TRUE METRIX AIR GLUCOSE METER) Device 1 Each by Does not apply route every day. 1 Device 0      No current facility-administered medications for this visit.          Social History:     Social History     Social History   • Marital status: Single     Spouse name: N/A   • Number of children: N/A   • Years of education: N/A     Occupational History   •  Other     Social History Main Topics   • Smoking status: Former Smoker     Years: 5.00   • Smokeless tobacco: Never Used   • Alcohol use Not on file   • Drug use: No   • Sexual activity: Yes     Partners: Female     Other Topics Concern   • Not on file     Social History Narrative   • No narrative on file       Family History:      Family History   Problem Relation Age of Onset   • Diabetes Mother    • Heart Disease Mother        Review of Systems:  All other review of systems are negative except what was mentioned above in the HPI.    Constitutional: Negative for fever, chills, weight loss and malaise/fatigue.    HEENT: No new auditory or visual complaints. No sore throat and neck pain.     Respiratory: Negative for cough, sputum production, shortness of breath and wheezing.    Cardiovascular: Negative for chest pain, palpitations, orthopnea and leg swelling.    Gastrointestinal: Negative for heartburn, nausea, vomiting and abdominal pain.    Genitourinary: Negative for dysuria, hematuria    Musculoskeletal: No new arthralgias or myalgias   Skin: Negative for rash and itching.    Neurological: Negative for focal weakness and headaches.    Endo/Heme/Allergies: Easy bleed/bruise.    Psychiatric/Behavioral: No new depression/anxiety.    Physical Exam:  Vitals: BP (!) 173/82   Pulse 88   Temp 37 °C (98.6 °F)   Resp 18   Ht 1.524 m (5')   Wt 107.6 kg (237 lb 5.2 oz)   SpO2 92%   BMI 46.35 kg/m²     General: Not in acute distress, alert and oriented x 3  HEENT: No pallor, icterus. Oropharynx clear.   Neck: Supple, no palpable masses.  Lymph nodes: No palpable cervical, supraclavicular, axillary or inguinal lymphadenopathy.    CVS: regular rate and rhythm,  no rubs or gallops  RESP: Clear to auscultate bilaterally, no wheezing or crackles.   ABD: Soft, non tender, non distended, positive bowel sounds, no palpable organomegaly  EXT: No edema or cyanosis  CNS: Alert and oriented x3, No focal deficits.  Skin-small ecchymosis involving the forearms      Labs:   No visits with results within 1 Week(s) from this visit.   Latest known visit with results is:   Hospital Outpatient Visit on 07/16/2018   Component Date Value Ref Range Status   • WBC 07/16/2018 13.3* 4.8 - 10.8 K/uL Final   • RBC 07/16/2018 4.89  4.20 - 5.40 M/uL Final   • Hemoglobin 07/16/2018 15.1  12.0 - 16.0 g/dL Final   • Hematocrit 07/16/2018 46.3  37.0 - 47.0 % Final   • MCV 07/16/2018 94.7  81.4 - 97.8 fL Final   • MCH 07/16/2018 30.9  27.0 - 33.0 pg Final   • MCHC 07/16/2018 32.6* 33.6 - 35.0 g/dL Final   • RDW 07/16/2018 43.8  35.9 - 50.0 fL Final   • Platelet Count 07/16/2018 487* 164 - 446 K/uL Final   • MPV 07/16/2018 9.2  9.0 - 12.9 fL Final   • Neutrophils-Polys 07/16/2018 65.40  44.00 - 72.00 % Final   • Lymphocytes 07/16/2018 21.70* 22.00 - 41.00 % Final   • Monocytes 07/16/2018 10.00  0.00 - 13.40 % Final   • Eosinophils 07/16/2018 1.60  0.00 - 6.90 % Final   • Basophils 07/16/2018 1.00  0.00 - 1.80 % Final   • Immature Granulocytes 07/16/2018 0.30  0.00 - 0.90 % Final   • Nucleated RBC 07/16/2018 0.00  /100 WBC Final   • Neutrophils (Absolute) 07/16/2018 8.72* 2.00 - 7.15 K/uL Final    Includes immature neutrophils, if present.   • Lymphs (Absolute) 07/16/2018 2.90  1.00 - 4.80 K/uL Final   • Monos (Absolute) 07/16/2018 1.33* 0.00 - 0.85 K/uL Final   • Eos (Absolute) 07/16/2018 0.22  0.00 - 0.51 K/uL Final   • Baso (Absolute) 07/16/2018 0.13* 0.00 - 0.12 K/uL Final   • Immature Granulocytes (abs) 07/16/2018 0.04  0.00 - 0.11 K/uL Final   • NRBC (Absolute) 07/16/2018 0.00  K/uL Final   • PT 07/16/2018 13.8  12.0 - 14.6 sec Final   • INR 07/16/2018 1.09  0.87 - 1.13 Final    Comment: INR -  Non-therapeutic Reference Range: 0.87-1.13  INR - Therapeutic Reference Range: 2.0-4.0     • APTT 07/16/2018 24.5* 24.7 - 36.0 sec Final    Therapeutic Heparin Range: 63-96 seconds             Assessment and Plan:  , Easy bruising- she had only minor bruising with no visceral bleed or hemarthrosis.  Von Willebrand screen was negative. Her PTT is actually on the lower side. She has been on SSRIs and I did check a platelet function , which actually came back, lower side, ruling out any platelet dysfunction. Reassured that she does not appear to have any bleeding diathesis and her easy bruising may be related to thin skin.  Hereditary spherocytosis-she is not anemic after splenectomy. She does have expected mild thrombocytosis after the splenectomy. She is up-to-date with vaccinations.    Reassured her that she does not need further hematology workup. We will be glad to see her back if she develops any major bleeding issues or other hematology issues in the future.    She agreed and verbalized  agreement and understanding with the current plan.  I answered all questions and concerns at this time         Please note that this dictation was created using voice recognition software. I have made every reasonable attempt to correct obvious errors, but I expect that there are errors of grammar and possibly content that I did not discover before finalizing the note.      SIGNATURES:  Srikanth De La Paz    CC:  Tan Bellamy, A.P.N.  No ref. provider found

## 2018-08-29 ENCOUNTER — HOSPITAL ENCOUNTER (OUTPATIENT)
Dept: RADIOLOGY | Facility: MEDICAL CENTER | Age: 58
End: 2018-08-29
Attending: INTERNAL MEDICINE
Payer: MEDICAID

## 2018-08-29 ENCOUNTER — HOSPITAL ENCOUNTER (OUTPATIENT)
Dept: LAB | Facility: MEDICAL CENTER | Age: 58
End: 2018-08-29
Attending: INTERNAL MEDICINE
Payer: MEDICAID

## 2018-08-29 DIAGNOSIS — D58.0 SPHEROCYTOSIS (FAMILIAL) (HCC): ICD-10-CM

## 2018-08-29 DIAGNOSIS — D75.839 THROMBOCYTOSIS: ICD-10-CM

## 2018-08-29 LAB
BASOPHILS # BLD AUTO: 0.9 % (ref 0–1.8)
BASOPHILS # BLD: 0.07 K/UL (ref 0–0.12)
EOSINOPHIL # BLD AUTO: 0.18 K/UL (ref 0–0.51)
EOSINOPHIL NFR BLD: 2.2 % (ref 0–6.9)
ERYTHROCYTE [DISTWIDTH] IN BLOOD BY AUTOMATED COUNT: 40.6 FL (ref 35.9–50)
HCT VFR BLD AUTO: 42.8 % (ref 37–47)
HGB BLD-MCNC: 14.9 G/DL (ref 12–16)
IMM GRANULOCYTES # BLD AUTO: 0.03 K/UL (ref 0–0.11)
IMM GRANULOCYTES NFR BLD AUTO: 0.4 % (ref 0–0.9)
LDH SERPL L TO P-CCNC: 245 U/L (ref 107–266)
LYMPHOCYTES # BLD AUTO: 2.16 K/UL (ref 1–4.8)
LYMPHOCYTES NFR BLD: 26.2 % (ref 22–41)
MCH RBC QN AUTO: 31.6 PG (ref 27–33)
MCHC RBC AUTO-ENTMCNC: 34.8 G/DL (ref 33.6–35)
MCV RBC AUTO: 90.9 FL (ref 81.4–97.8)
MEDICATIONS NOTED 1688: ABNORMAL
MONOCYTES # BLD AUTO: 0.82 K/UL (ref 0–0.85)
MONOCYTES NFR BLD AUTO: 10 % (ref 0–13.4)
NEUTROPHILS # BLD AUTO: 4.97 K/UL (ref 2–7.15)
NEUTROPHILS NFR BLD: 60.3 % (ref 44–72)
NRBC # BLD AUTO: 0 K/UL
NRBC BLD-RTO: 0 /100 WBC
PLATELET # BLD AUTO: 446 K/UL (ref 164–446)
PLT FUNCTION COL/EPI  1661: 80 SEC (ref 83–170)
PMV BLD AUTO: 9.2 FL (ref 9–12.9)
RBC # BLD AUTO: 4.71 M/UL (ref 4.2–5.4)
WBC # BLD AUTO: 8.2 K/UL (ref 4.8–10.8)

## 2018-08-29 PROCEDURE — 85576 BLOOD PLATELET AGGREGATION: CPT

## 2018-08-29 PROCEDURE — 36415 COLL VENOUS BLD VENIPUNCTURE: CPT

## 2018-08-29 PROCEDURE — 71046 X-RAY EXAM CHEST 2 VIEWS: CPT

## 2018-08-29 PROCEDURE — 85025 COMPLETE CBC W/AUTO DIFF WBC: CPT

## 2018-08-29 PROCEDURE — 83615 LACTATE (LD) (LDH) ENZYME: CPT

## 2018-09-06 ENCOUNTER — APPOINTMENT (OUTPATIENT)
Dept: RADIOLOGY | Facility: MEDICAL CENTER | Age: 58
End: 2018-09-06
Attending: INTERNAL MEDICINE
Payer: MEDICAID

## 2018-09-13 ENCOUNTER — HOSPITAL ENCOUNTER (OUTPATIENT)
Dept: LAB | Facility: MEDICAL CENTER | Age: 58
End: 2018-09-13
Attending: NURSE PRACTITIONER
Payer: MEDICAID

## 2018-09-13 ENCOUNTER — HOSPITAL ENCOUNTER (OUTPATIENT)
Dept: RADIOLOGY | Facility: MEDICAL CENTER | Age: 58
End: 2018-09-13
Attending: INTERNAL MEDICINE
Payer: MEDICAID

## 2018-09-13 DIAGNOSIS — D75.839 THROMBOCYTOSIS: ICD-10-CM

## 2018-09-13 DIAGNOSIS — D58.0 SPHEROCYTOSIS (FAMILIAL) (HCC): ICD-10-CM

## 2018-09-13 DIAGNOSIS — Z13.21 ENCOUNTER FOR VITAMIN DEFICIENCY SCREENING: ICD-10-CM

## 2018-09-13 DIAGNOSIS — E11.9 TYPE 2 DIABETES MELLITUS WITHOUT COMPLICATION, WITHOUT LONG-TERM CURRENT USE OF INSULIN (HCC): ICD-10-CM

## 2018-09-13 LAB
25(OH)D3 SERPL-MCNC: 39 NG/ML (ref 30–100)
ANION GAP SERPL CALC-SCNC: 9 MMOL/L (ref 0–11.9)
BUN SERPL-MCNC: 13 MG/DL (ref 8–22)
CALCIUM SERPL-MCNC: 9 MG/DL (ref 8.5–10.5)
CHLORIDE SERPL-SCNC: 102 MMOL/L (ref 96–112)
CO2 SERPL-SCNC: 31 MMOL/L (ref 20–33)
CREAT SERPL-MCNC: 0.5 MG/DL (ref 0.5–1.4)
EST. AVERAGE GLUCOSE BLD GHB EST-MCNC: 134 MG/DL
FASTING STATUS PATIENT QL REPORTED: NORMAL
GLUCOSE SERPL-MCNC: 139 MG/DL (ref 65–99)
HBA1C MFR BLD: 6.3 % (ref 0–5.6)
POTASSIUM SERPL-SCNC: 3.5 MMOL/L (ref 3.6–5.5)
SODIUM SERPL-SCNC: 142 MMOL/L (ref 135–145)
VIT B12 SERPL-MCNC: 467 PG/ML (ref 211–911)

## 2018-09-13 PROCEDURE — 36415 COLL VENOUS BLD VENIPUNCTURE: CPT

## 2018-09-13 PROCEDURE — 82306 VITAMIN D 25 HYDROXY: CPT

## 2018-09-13 PROCEDURE — 80048 BASIC METABOLIC PNL TOTAL CA: CPT

## 2018-09-13 PROCEDURE — 82607 VITAMIN B-12: CPT

## 2018-09-13 PROCEDURE — 83036 HEMOGLOBIN GLYCOSYLATED A1C: CPT

## 2018-09-13 PROCEDURE — 76700 US EXAM ABDOM COMPLETE: CPT

## 2018-09-18 DIAGNOSIS — J45.30 MILD PERSISTENT ASTHMA, UNCOMPLICATED: ICD-10-CM

## 2018-09-18 RX ORDER — HYDROXYZINE HYDROCHLORIDE 25 MG/1
TABLET, FILM COATED ORAL
Qty: 30 TAB | Refills: 2 | Status: SHIPPED | OUTPATIENT
Start: 2018-09-18 | End: 2018-12-10 | Stop reason: SDUPTHER

## 2018-09-18 RX ORDER — GLIPIZIDE 5 MG/1
TABLET ORAL
Qty: 60 TAB | Refills: 2 | Status: SHIPPED | OUTPATIENT
Start: 2018-09-18 | End: 2019-01-17 | Stop reason: SDUPTHER

## 2018-09-18 RX ORDER — MONTELUKAST SODIUM 10 MG/1
TABLET ORAL
Qty: 30 TAB | Refills: 2 | Status: SHIPPED | OUTPATIENT
Start: 2018-09-18 | End: 2018-12-21 | Stop reason: SDUPTHER

## 2018-09-18 RX ORDER — ALBUTEROL SULFATE 90 UG/1
2 AEROSOL, METERED RESPIRATORY (INHALATION) EVERY 6 HOURS PRN
Qty: 1 INHALER | Refills: 2 | Status: SHIPPED | OUTPATIENT
Start: 2018-09-18 | End: 2019-01-17 | Stop reason: SDUPTHER

## 2018-09-18 RX ORDER — AMLODIPINE BESYLATE 10 MG/1
TABLET ORAL
Qty: 30 TAB | Refills: 2 | Status: SHIPPED | OUTPATIENT
Start: 2018-09-18 | End: 2018-12-10 | Stop reason: SDUPTHER

## 2018-10-23 ENCOUNTER — OFFICE VISIT (OUTPATIENT)
Dept: MEDICAL GROUP | Facility: MEDICAL CENTER | Age: 58
End: 2018-10-23
Attending: NURSE PRACTITIONER
Payer: MEDICAID

## 2018-10-23 VITALS
OXYGEN SATURATION: 92 % | TEMPERATURE: 98 F | SYSTOLIC BLOOD PRESSURE: 122 MMHG | BODY MASS INDEX: 47.51 KG/M2 | WEIGHT: 242 LBS | RESPIRATION RATE: 16 BRPM | HEIGHT: 60 IN | HEART RATE: 90 BPM | DIASTOLIC BLOOD PRESSURE: 78 MMHG

## 2018-10-23 DIAGNOSIS — G89.29 CHRONIC PAIN OF RIGHT KNEE: ICD-10-CM

## 2018-10-23 DIAGNOSIS — R23.3 EASY BRUISING: ICD-10-CM

## 2018-10-23 DIAGNOSIS — E11.9 TYPE 2 DIABETES MELLITUS WITHOUT COMPLICATION, WITHOUT LONG-TERM CURRENT USE OF INSULIN (HCC): ICD-10-CM

## 2018-10-23 DIAGNOSIS — R23.2 HOT FLASHES: ICD-10-CM

## 2018-10-23 DIAGNOSIS — M25.561 CHRONIC PAIN OF RIGHT KNEE: ICD-10-CM

## 2018-10-23 PROCEDURE — 99214 OFFICE O/P EST MOD 30 MIN: CPT | Performed by: NURSE PRACTITIONER

## 2018-10-23 PROCEDURE — 99213 OFFICE O/P EST LOW 20 MIN: CPT | Performed by: NURSE PRACTITIONER

## 2018-10-23 RX ORDER — TRAMADOL HYDROCHLORIDE 50 MG/1
50 TABLET ORAL EVERY 8 HOURS PRN
Qty: 14 TAB | Refills: 0 | Status: SHIPPED | OUTPATIENT
Start: 2018-10-23 | End: 2018-11-22

## 2018-10-23 ASSESSMENT — PAIN SCALES - GENERAL: PAINLEVEL: 5=MODERATE PAIN

## 2018-10-23 NOTE — ASSESSMENT & PLAN NOTE
Pt reports the Venlafaxine has been helping and no hot flashes at all.  Did see GYN in w DR Errol Jordan and she states that Venlafaxine is fine.   To continue the meds  To f/u 6 months for next Pap smear.

## 2018-10-23 NOTE — PROGRESS NOTES
Chief Complaint:   Chief Complaint   Patient presents with   • Follow-Up       HPI:  Natalie is here today for a follow-up on U/S Results., lab results    PMH includes:  Situational Stress  Asthma  HTN  Hyperlipidemia  Obesity  Vitamin D Deficiency  DM-2  Right Knee DJD  Splenectomy  Tonsillectomy    Cholecystectomy  Chronic Hoarse Throat  FIT Stool TEST positive (2017 and 10/2017)  Left calf strain  Easy bruising to hands  Slightly elevated Platelets        Established with    Orthopedics- Dr Ospina ([facility])  [facility]     Recent Referral Approved;  GYN- DR Errol Lema  Report:  3/20/18 Tramadol 50 mg # 14 by DR Ospina  6 RX and 2 Prescriber's in report  ---------------------------------------------------------------     Review of Records shows:  18 LAb REsults CMP normal except k+= 3.5, BS= 139, GFR normal, A1C= 6.3 (~ 134, hx of Pre DM), Vit B12 = 467, Vit D= 39    18 U/S Abd REport(DR De La Paz ordered):  1.  There is a large amount of gas in the splenic fossa. No gross accessory spleen can be appreciated. Given limited visibility, CT may be helpful for more complete evaluation for accessory spleen.  2.  2.6 cm diameter left renal cyst.  3.  Hepatomegaly.    18 Hematology Appt with  Ana Cristina for f/u on easy bruising and hx of splenectomy.  ---> Ordered Abd U/S to look for accessory Spleen    18 Clinic visit for f/u on Hot Flashes, easy bruising.  --> Stop Prozac, Start Venlafaxine, to see GYN in October regarding these symptoms.  F/u Labs ordered.    18 Labs CBC normal , Platelets slightly elevated = 487, PT, PTT okay.  7/10/18 Telephone Encounter from Natalie minor concern about low blood sugar and bruising. Labs ordered.   7/3/18 Clinic for F/u on skin wound, hot flashes. Rx changed from Paxil to Prozac, To see GYN (Appt set for 10/1/18)  18 Clinic Visit w Dr Louise for left arm dog scratches/wound infection. RX for Keflex, Kenalog cream. To f/u  PCP.     6/5/18 Clinic visit for Hot Flashes post menopausal. Referred to GYN, RX for trial of Paxil.      Colonoscopy on 5/1/18-- no Results available     5/9/18 Labs- CBC normal except Platelets elevated= 462, PT and PTT normal, A1C= 6.0, Fasting BS= 112, otherwise CMP normal, lipids normal,                       Vit B12 normal, TSH= 2.070, Vit D= 23 (low)-----> Telephone Encounter to Patient and Referral to Hematology.  5/8/18 Clinic visit w concern about easy hand bruising, elevated platelet count hx. Need for new Glucometer  ---> labs ordered.       Easy bruising  Pt reports still bruises really easy.  We reviewed the U/s that Dr De La Paz ordered and no obvious accessory spleen.  No blood noticed in stool  Denies gum bleeding.    Hot flashes  Pt reports the Venlafaxine has been helping and no hot flashes at all.  Did see GYN in w DR Errol Jordan and she states that Venlafaxine is fine.   To continue the meds  To f/u 6 months for next Pap smear.    Type 2 diabetes mellitus without complication  Discussed her recent labs showing mild DM-2  Continuing  Glipizide.        Patient Active Problem List    Diagnosis Date Noted   • Spherocytosis (familial) ([facility]) 08/28/2018   • Visit for wound check 07/03/2018   • Hot flashes 06/05/2018   • Thrombocytosis ([facility]) 05/16/2018   • Easy bruising 05/08/2018   • Encounter for routine gynecological examination with Papanicolaou smear of cervix 03/28/2018   • Laceration of left index finger 03/12/2018   • Routine adult health maintenance 03/12/2018   • Cough 12/19/2017   • Stress incontinence of urine 11/29/2017   • Burn, thumb, first degree, left, initial encounter 11/13/2017   • Right calf pain 11/01/2017   • Fecal occult blood test positive 10/04/2017   • Injury of right lower extremity 08/22/2017   • Environmental allergies 03/30/2017   • Morbid obesity with BMI of 45.0-49.9, adult ([facility]) 02/16/2017   • Mood changes 12/13/2016   • Hoarseness, persistent 07/07/2016   • Insomnia  07/07/2016   • Hyperlipidemia 06/09/2016   • Osteoarthritis of knee 04/14/2016   • Vision problems 03/24/2016   • Right knee pain 03/24/2016   • Vitamin D deficiency 11/12/2015   • Type 2 diabetes mellitus without complication (HCC) 11/12/2015   • Essential hypertension 10/22/2015   • Moderate persistent asthma 10/22/2015   • Episodic headache 10/22/2015   • H/O splenectomy 10/22/2015       Allergies:Voltaren [diclofenac-disod edta]    Medicines as of today:  Current Outpatient Prescriptions   Medication Sig Dispense Refill   • tramadol (ULTRAM) 50 MG Tab Take 1 Tab by mouth every 8 hours as needed for up to 30 days. 14 Tab 0   • valsartan (DIOVAN) 160 MG Tab TAKE 1 TABLET ORALLY ONCE DAILY 30 Tab 5   • carBAMazepine (TEGRETOL) 200 MG Tab TAKE 1 TABLET ORALLY IN THE MORNING AND TAKE 2 TABLETS ORALLY IN THE EVENING 90 Tab 5   • montelukast (SINGULAIR) 10 MG Tab TAKE 1 TABLET ORALLY ONCE DAILY 30 Tab 2   • glipiZIDE (GLUCOTROL) 5 MG Tab TAKE 1 TABLET ORALLY 2 TIMES DAILY 60 Tab 2   • hydrOXYzine HCl (ATARAX) 25 MG Tab TAKE 1 TABLET ORALLY NIGHTLY AT BEDTIME IF NEEDED FOR ANXIETY/INSOMNIA 30 Tab 2   • amLODIPine (NORVASC) 10 MG Tab TAKE 1 TABLET ORALLY ONCE DAILY 30 Tab 2   • VENTOLIN  (90 Base) MCG/ACT Aero Soln inhalation aerosol Inhale 2 Puffs by mouth every 6 hours as needed for Shortness of Breath. 1 Inhaler 2   • venlafaxine XR (EFFEXOR XR) 37.5 MG CAPSULE SR 24 HR Take 1 Cap by mouth every day. 30 Cap 3   • ipratropium (ATROVENT) 17 MCG/ACT Aero Soln Inhale 2 Puffs by mouth every 6 hours. 17 g 5   • FLONASE ALLERGY RELIEF 50 MCG/ACT nasal spray SPRAY 1 SPRAY IN EACH NOSTRIL ONCE DAILY *60 DAY SUPPLY* 1 Bottle 2   • D3 SUPER STRENGTH 2000 units Cap TAKE 2 CAPSULES ORALLY (4000 UN) ONCE DAILY 60 Cap 4   • loratadine (CLARITIN) 10 MG Tab Take 10 mg by mouth every day.     • Blood Glucose Monitoring Suppl (TRUE METRIX AIR GLUCOSE METER) Device 1 Each by Does not apply route every day. 1 Device 0   • glucose  blood (TRUE METRIX BLOOD GLUCOSE TEST) strip 1 Strip by Other route as needed. Test daily fasting in am and if symptoms 50 Strip 5   • Lancets Misc Lancets for use with glucometer, daily and if symptoms 100 Each 5   • atorvastatin (LIPITOR) 40 MG Tab TAKE 1 TABLET ORALLY ONCE DAILY 30 Tab 11   • albuterol (PROVENTIL) 2.5mg/3ml Nebu Soln solution for nebulization 3 mL by Nebulization route every four hours as needed for Shortness of Breath. 75 mL 3   • montelukast (SINGULAIR) 10 MG Tab TAKE 1 TABLET ORALLY ONCE DAILY 30 Tab 2   • glucose blood (TRUE METRIX BLOOD GLUCOSE TEST) strip 1 Strip by Other route as needed. 50 Strip 2   • Blood Glucose Monitoring Suppl (TRUE METRIX AIR GLUCOSE METER) Device 1 Each by Does not apply route every day. 1 Device 0   • TECHLITE LANCETS Misc      • TRUE METRIX BLOOD GLUCOSE TEST strip FINGER STICK BLOOD SUGAR ONCE DAILY FASTING AND IF SYMPTOMATIC 50 Strip 11   • LUMIGAN 0.01 % Solution 1 Drop every bedtime.     • estradiol (VAGIFEM) 10 MCG Tab Insert 1 Tab in vagina every 3 days. 8 Tab 2   • hydrocodone-acetaminophen (NORCO) 5-325 MG Tab per tablet Take 1 Tab by mouth every 8 hours as needed. 21 Tab 0     No current facility-administered medications for this visit.        Social History   Substance Use Topics   • Smoking status: Former Smoker     Years: 5.00   • Smokeless tobacco: Never Used   • Alcohol use Not on file       Past Medical History:   Diagnosis Date   • Asthma    • Diabetes (HCC)    • Hypertension    • Spherocytosis (familial) (HCC)    • Spherocytosis (familial) (HCC) 8/28/2018       Family History   Problem Relation Age of Onset   • Diabetes Mother    • Heart Disease Mother        ROS:  Review of Systems   See HPI Above    Exam:  Blood pressure 122/78, pulse 90, temperature 36.7 °C (98 °F), temperature source Temporal, resp. rate 16, height 1.524 m (5'), weight 109.8 kg (242 lb), SpO2 92 %, not currently breastfeeding. Body mass index is 47.26 kg/m².    General:  Well  nourished, over-weight, well developed female in NAD  HENT:Head is grossly normal. PERRL.  Neck: Supple. Trachea is midline.  Pulmonary: Clear to ausculation .  Normal effort. No rales, ronchi, or wheezing.   Cardiovascular: Regular rate and rhythm.  Abdomen-Abdomen is soft, No tenderness.  Upper extremities- Good ROM  Lower extremities- neg for edema, redness, tenderness.  Neuro- A & O x 4. Speech clear and appropriate.    Current medications, allergies, and problem list reviewed with patient and updated in [facility] today.    Assessment/Plan:  1. Easy bruising  To be careful when working outside.     2. Hot flashes  Continue Venlafaxine, f/u gyn as needed   3. Type 2 diabetes mellitus without complication, without long-term current use of insulin (HCC)  Continue Glipizide  Decrease sugar/carbs in diet.  Increase exercise and work on losing weight.   4. Chronic pain of right knee  tramadol (ULTRAM) 50 MG Tab # 14 to last 30 days.  Next visit if wanting Tramadol for knee pain, discussed will need Controlled Substance Contract and UDS.       Return in about 4 weeks (around 11/20/2018) for Pain Med F/U.

## 2018-10-23 NOTE — ASSESSMENT & PLAN NOTE
Pt reports still bruises really easy.  We reviewed the U/s that Dr De La Paz ordered and no obvious accessory spleen.  No blood noticed in stool  Denies gum bleeding.

## 2018-11-09 ENCOUNTER — OFFICE VISIT (OUTPATIENT)
Dept: MEDICAL GROUP | Facility: MEDICAL CENTER | Age: 58
End: 2018-11-09
Attending: FAMILY MEDICINE
Payer: MEDICAID

## 2018-11-09 VITALS
TEMPERATURE: 98.2 F | BODY MASS INDEX: 48.49 KG/M2 | SYSTOLIC BLOOD PRESSURE: 160 MMHG | HEIGHT: 60 IN | HEART RATE: 108 BPM | WEIGHT: 247 LBS | OXYGEN SATURATION: 92 % | DIASTOLIC BLOOD PRESSURE: 82 MMHG | RESPIRATION RATE: 16 BRPM

## 2018-11-09 DIAGNOSIS — J98.01 BRONCHOSPASM, ACUTE: ICD-10-CM

## 2018-11-09 PROCEDURE — 99212 OFFICE O/P EST SF 10 MIN: CPT | Performed by: FAMILY MEDICINE

## 2018-11-09 PROCEDURE — 99213 OFFICE O/P EST LOW 20 MIN: CPT | Performed by: FAMILY MEDICINE

## 2018-11-09 RX ORDER — BUDESONIDE AND FORMOTEROL FUMARATE DIHYDRATE 160; 4.5 UG/1; UG/1
2 AEROSOL RESPIRATORY (INHALATION) 2 TIMES DAILY
Qty: 1 INHALER | Refills: 0 | Status: SHIPPED | OUTPATIENT
Start: 2018-11-09 | End: 2019-03-05

## 2018-11-09 RX ORDER — BUDESONIDE AND FORMOTEROL FUMARATE DIHYDRATE 160; 4.5 UG/1; UG/1
2 AEROSOL RESPIRATORY (INHALATION) 2 TIMES DAILY
Qty: 1 INHALER | Refills: 0 | Status: SHIPPED | OUTPATIENT
Start: 2018-11-09 | End: 2018-11-09 | Stop reason: SDUPTHER

## 2018-11-09 ASSESSMENT — PATIENT HEALTH QUESTIONNAIRE - PHQ9: CLINICAL INTERPRETATION OF PHQ2 SCORE: 0

## 2018-11-10 NOTE — PROGRESS NOTES
Subjective:      Natalie Eugene is a 58 y.o. female who presents with No chief complaint on file.            HPI1.Bronchospasm- notes onset new dry cough past 2 days.  Patient notes history of intermittent presumably allergic coughs.  She reports she has a history of these progressing over 2-4 days to tight bronchospasm and has wound up in the hospital on several occasions for admission in order to receive intensive respiratory support.  Her history of intermittent wheezing began 6 years ago.  She currently has been using Atrovent once a day and does have a rescue albuterol inhaler although she has not been using it.  She also has a albuterol nebulizer for emergency use as well.  She reports using Advair in the distant past.    ROS negative for current sore throat, sputum production, fever       Objective:     /82 (BP Location: Left arm, Patient Position: Sitting, BP Cuff Size: Large adult)   Pulse (!) 108   Temp 36.8 °C (98.2 °F) (Temporal)   Resp 16   Ht 1.524 m (5')   Wt 112 kg (247 lb)   LMP 01/22/2010   SpO2 92%   BMI 48.24 kg/m²      Physical Exam  General- alert,cooperative patient in no acute distress  Ears- normal tms without redness, perforation. Canals unremarkable  Nares- clear, pink, moist mucosa without bleeding. No purulent nasal DC  Orophx.- lips normal. Clear, pink, moist mucosa without redness or exudate. Tongue is midline  Chest-Normal to auscultation and percussion, movement is symmetric. Nontender to palpation.                Assessment/Plan:     1. Bronchospasm, acute    Plan: 1.  May add Symbicort 160/5-2 puffs twice daily for up to 10 days  2.  May increase Atrovent to 2-4 times daily  3.  Follow-up as needed

## 2018-11-16 ENCOUNTER — TELEPHONE (OUTPATIENT)
Dept: HEMATOLOGY ONCOLOGY | Facility: MEDICAL CENTER | Age: 58
End: 2018-11-16

## 2018-11-20 ENCOUNTER — OFFICE VISIT (OUTPATIENT)
Dept: MEDICAL GROUP | Facility: MEDICAL CENTER | Age: 58
End: 2018-11-20
Attending: NURSE PRACTITIONER
Payer: MEDICAID

## 2018-11-20 VITALS
OXYGEN SATURATION: 95 % | WEIGHT: 241 LBS | HEIGHT: 60 IN | SYSTOLIC BLOOD PRESSURE: 130 MMHG | HEART RATE: 86 BPM | RESPIRATION RATE: 20 BRPM | BODY MASS INDEX: 47.32 KG/M2 | DIASTOLIC BLOOD PRESSURE: 80 MMHG | TEMPERATURE: 97.5 F

## 2018-11-20 DIAGNOSIS — J45.40 MODERATE PERSISTENT ASTHMA WITHOUT COMPLICATION: ICD-10-CM

## 2018-11-20 DIAGNOSIS — G89.29 CHRONIC PAIN OF RIGHT KNEE: ICD-10-CM

## 2018-11-20 DIAGNOSIS — M25.561 CHRONIC PAIN OF RIGHT KNEE: ICD-10-CM

## 2018-11-20 DIAGNOSIS — R09.89 SYMPTOMS OF UPPER RESPIRATORY INFECTION (URI): ICD-10-CM

## 2018-11-20 DIAGNOSIS — J40 BRONCHITIS: ICD-10-CM

## 2018-11-20 DIAGNOSIS — Z79.891 CHRONIC USE OF OPIATE FOR THERAPEUTIC PURPOSE: ICD-10-CM

## 2018-11-20 DIAGNOSIS — I10 ESSENTIAL HYPERTENSION: ICD-10-CM

## 2018-11-20 DIAGNOSIS — J45.901 MILD ASTHMA WITH EXACERBATION, UNSPECIFIED WHETHER PERSISTENT: ICD-10-CM

## 2018-11-20 PROCEDURE — 99213 OFFICE O/P EST LOW 20 MIN: CPT | Performed by: NURSE PRACTITIONER

## 2018-11-20 PROCEDURE — 99214 OFFICE O/P EST MOD 30 MIN: CPT | Performed by: NURSE PRACTITIONER

## 2018-11-20 RX ORDER — IPRATROPIUM BROMIDE 17 UG/1
AEROSOL, METERED RESPIRATORY (INHALATION)
Qty: 1 INHALER | Refills: 0 | Status: SHIPPED | OUTPATIENT
Start: 2018-11-20 | End: 2019-03-05

## 2018-11-20 RX ORDER — INHALER, ASSIST DEVICES
SPACER (EA) MISCELLANEOUS
Qty: 1 EACH | Refills: 1 | Status: SHIPPED | OUTPATIENT
Start: 2018-11-20

## 2018-11-20 RX ORDER — TRAMADOL HYDROCHLORIDE 50 MG/1
50 TABLET ORAL EVERY 12 HOURS PRN
Qty: 14 TAB | Refills: 0 | Status: SHIPPED | OUTPATIENT
Start: 2018-12-20 | End: 2018-12-27

## 2018-11-20 RX ORDER — DOXYCYCLINE 100 MG/1
100 CAPSULE ORAL 2 TIMES DAILY
Qty: 14 CAP | Refills: 0 | Status: SHIPPED | OUTPATIENT
Start: 2018-11-20 | End: 2019-03-05

## 2018-11-20 RX ORDER — TRAMADOL HYDROCHLORIDE 50 MG/1
50 TABLET ORAL EVERY 12 HOURS PRN
Qty: 14 TAB | Refills: 0 | Status: SHIPPED | OUTPATIENT
Start: 2018-11-20 | End: 2018-11-27

## 2018-11-20 NOTE — ASSESSMENT & PLAN NOTE
/80 in clinic today. Known hx of HTN.  Pt stating that they have been taking their medication as prescribed.   Pt denies HA, neurologic deficits, CP, SOB.

## 2018-11-20 NOTE — ASSESSMENT & PLAN NOTE
Hx of Asthma, Former Smoker  Reports her room-mate is sick and has pneumonia.  Reports told was contagious  And since Natalie has asthma and room-mate has been using Natalie's nebulizer..

## 2018-11-20 NOTE — ASSESSMENT & PLAN NOTE
Hx of Chronic Right knee pain. In past tx by Dr Ospina, [facility]  At last visit asked me to start prescribing Tramadol for intermittent use.  Instructed at this visit today she would need to provide Urine for UDS and  Sigh Controlled Substance Agreement for me to continue low dose  Tramadol prn pain. If escalating pain, and need for higher dosage  I would need to refer to pain clinic or back to Ortho as chronic narcotics  And increasing doses is not a good plan for chronic pain in my medical  Judgement.

## 2018-11-20 NOTE — PROGRESS NOTES
Chief Complaint:   Chief Complaint   Patient presents with   • Follow-Up     roomate is sick, and is contagious, URI    • Medication Refill       HPI:  Natalie is here today for a follow-up on chronic pain/tramadol med use.      PMH includes:  Situational Stress  Asthma  HTN  Hyperlipidemia  Obesity  Vitamin D Deficiency  DM-2  Right Knee DJD  Chronic Knee Pain  Splenectomy  Tonsillectomy    Cholecystectomy  Chronic Hoarse Throat  FIT Stool TEST positive (2017 and 10/2017)  Left calf strain  Easy bruising to hands  Slightly elevated Platelets        Established with    Orthopedics- Dr Ospina ([facility])  [facility]     Recent Referral Approved;  GYN- DR Errol Lema  Report:  10/23/18 Tramadol 50 mg # 14 to last 30 days  3/20/18 Tramadol 50 mg # 14 by DR Ospina  6 RX and 2 Prescriber's in report  ---------------------------------------------------------------     Review of Records shows:  18 Clinic Appt w Dr Louise for Bronchitis  Wheezing. RX Symbicort, To increase Atrovent use to 2-4 per day.  10/23/18 Clinic for U/S results, Lab results. Chronic Right knee pain asking for Tramadol RX from me instead of from Dr Ospina( Orthopedics).  RX Tramadol, discussed if needs monthly will  Need to sign agreement and provide Urine for UDS  At next appt.    18 LAb REsults CMP normal except k+= 3.5, BS= 139, GFR normal, A1C= 6.3 (~ 134, hx of Pre DM), Vit B12 = 467, Vit D= 39     18 U/S Abd REport(DR De La Paz ordered):  1.  There is a large amount of gas in the splenic fossa. No gross accessory spleen can be appreciated. Given limited visibility, CT may be helpful for more complete evaluation for accessory spleen.  2.  2.6 cm diameter left renal cyst.  3.  Hepatomegaly.     18 Hematology Appt with  Ana Cristina for f/u on easy bruising and hx of splenectomy.  ---> Ordered Abd U/S to look for accessory Spleen     18 Clinic visit for f/u on Hot Flashes, easy bruising.  --> Stop  Prozac, Start Venlafaxine, to see GYN in October regarding these symptoms.  F/u Labs ordered.     7/16/18 Labs CBC normal , Platelets slightly elevated = 487, PT, PTT okay.  7/10/18 Telephone Encounter from Natalie minor concern about low blood sugar and bruising. Labs ordered.   7/3/18 Clinic for F/u on skin wound, hot flashes. Rx changed from Paxil to Prozac, To see GYN (Appt set for 10/1/18)  6/22/18 Clinic Visit w Dr Louise for left arm dog scratches/wound infection. RX for Keflex, Kenalog cream. To f/u PCP.     6/5/18 Clinic visit for Hot Flashes post menopausal. Referred to GYN, RX for trial of Paxil.    ---------------------------------------------------------------------------------------------------------------------------------------------------------    Right knee pain/Chronic Use of Opiates for Therapeutic Purposes  Hx of Chronic Right knee pain. In past tx by Dr Ospina, [facility]    Reports ongoing knee pain and intermittent use of Naproxen and Tramadol.    States she is hoping to lose weight and then get knee replacement.  Does see Dr Ospina for intermittent injections to her right knee  Currently.    At last visit asked me to start prescribing Tramadol for intermittent use.  Instructed at this visit today she would need to provide Urine for UDS and  Sigh Controlled Substance Agreement for me to continue low dose  Tramadol prn pain. If escalating pain, and need for higher dosage  I would need to refer to pain clinic or back to Ortho as chronic narcotics  And increasing doses is not a good plan for chronic pain in my medical  Judgement.      Symptoms of upper respiratory infection (URI)  Hx of Asthma, Former Smoker  Reports her room-mate is sick and has pneumonia.  Reports told was contagious  And since Natalie has asthma and room-mate has been using Natalie's nebulizer..  Patient reports that she just has a little runny nose and no cough currently,  But is worried due to her recent steroid use and  history of asthma patient will catch this bacterial infection  From her roommate.  We discussed a contingent antibiotic for doxycycline if she develops cough with productive mucus, fever,  Or feels like she is developing bronchitis.  To continue using her inhalers and to clean her nebulizer and sterilize it prior to using it herself  As her roommate has been using.      Moderate persistent asthma  REports is using Symbicort and Albuterol and Atrovent.  Wants to get spacer  No wheezing now.    Essential hypertension  /80 in clinic today. Known hx of HTN.  Pt stating that they have been taking their medication as prescribed.   Pt denies HA, neurologic deficits, CP, SOB.       Patient Active Problem List    Diagnosis Date Noted   • Symptoms of upper respiratory infection (URI) 11/20/2018   • Spherocytosis (familial) ([facility]) 08/28/2018   • Visit for wound check 07/03/2018   • Hot flashes 06/05/2018   • Thrombocytosis ([facility]) 05/16/2018   • Easy bruising 05/08/2018   • Encounter for routine gynecological examination with Papanicolaou smear of cervix 03/28/2018   • Laceration of left index finger 03/12/2018   • Routine adult health maintenance 03/12/2018   • Cough 12/19/2017   • Stress incontinence of urine 11/29/2017   • Burn, thumb, first degree, left, initial encounter 11/13/2017   • Right calf pain 11/01/2017   • Fecal occult blood test positive 10/04/2017   • Injury of right lower extremity 08/22/2017   • Environmental allergies 03/30/2017   • Morbid obesity with BMI of 45.0-49.9, adult ([facility]) 02/16/2017   • Mood changes 12/13/2016   • Hoarseness, persistent 07/07/2016   • Insomnia 07/07/2016   • Hyperlipidemia 06/09/2016   • Osteoarthritis of knee 04/14/2016   • Vision problems 03/24/2016   • Right knee pain 03/24/2016   • Vitamin D deficiency 11/12/2015   • Type 2 diabetes mellitus without complication ([facility]) 11/12/2015   • Essential hypertension 10/22/2015   • Moderate persistent asthma 10/22/2015   • Episodic  headache 10/22/2015   • H/O splenectomy 10/22/2015       Allergies:Voltaren [diclofenac-disod edta]    Medicines as of today:  Current Outpatient Prescriptions   Medication Sig Dispense Refill   • Spacer/Aero-Holding Chambers (BREATHERITE HAROON SPACER ADULT) Misc Spacer for use with Inhaler Generic or Brand name as substitution if covered by insurance 1 Each 1   • doxycycline (MONODOX) 100 MG capsule Take 1 Cap by mouth 2 times a day. 14 Cap 0   • tramadol (ULTRAM) 50 MG Tab Take 1 Tab by mouth every 12 hours as needed for up to 7 days. 14 Tab 0   • [START ON 12/20/2018] tramadol (ULTRAM) 50 MG Tab Take 1 Tab by mouth every 12 hours as needed for up to 7 days. 14 Tab 0   • budesonide-formoterol (SYMBICORT) 160-4.5 MCG/ACT Aerosol Inhale 2 Puffs by mouth 2 Times a Day. 1 Inhaler 0   • tramadol (ULTRAM) 50 MG Tab Take 1 Tab by mouth every 8 hours as needed for up to 30 days. 14 Tab 0   • valsartan (DIOVAN) 160 MG Tab TAKE 1 TABLET ORALLY ONCE DAILY 30 Tab 5   • carBAMazepine (TEGRETOL) 200 MG Tab TAKE 1 TABLET ORALLY IN THE MORNING AND TAKE 2 TABLETS ORALLY IN THE EVENING 90 Tab 5   • montelukast (SINGULAIR) 10 MG Tab TAKE 1 TABLET ORALLY ONCE DAILY 30 Tab 2   • glipiZIDE (GLUCOTROL) 5 MG Tab TAKE 1 TABLET ORALLY 2 TIMES DAILY 60 Tab 2   • hydrOXYzine HCl (ATARAX) 25 MG Tab TAKE 1 TABLET ORALLY NIGHTLY AT BEDTIME IF NEEDED FOR ANXIETY/INSOMNIA 30 Tab 2   • amLODIPine (NORVASC) 10 MG Tab TAKE 1 TABLET ORALLY ONCE DAILY 30 Tab 2   • VENTOLIN  (90 Base) MCG/ACT Aero Soln inhalation aerosol Inhale 2 Puffs by mouth every 6 hours as needed for Shortness of Breath. 1 Inhaler 2   • venlafaxine XR (EFFEXOR XR) 37.5 MG CAPSULE SR 24 HR Take 1 Cap by mouth every day. 30 Cap 3   • ipratropium (ATROVENT) 17 MCG/ACT Aero Soln Inhale 2 Puffs by mouth every 6 hours. 17 g 5   • FLONASE ALLERGY RELIEF 50 MCG/ACT nasal spray SPRAY 1 SPRAY IN EACH NOSTRIL ONCE DAILY *60 DAY SUPPLY* 1 Bottle 2   • D3 SUPER STRENGTH 2000 units Cap  TAKE 2 CAPSULES ORALLY (4000 UN) ONCE DAILY 60 Cap 4   • loratadine (CLARITIN) 10 MG Tab Take 10 mg by mouth every day.     • Blood Glucose Monitoring Suppl (TRUE METRIX AIR GLUCOSE METER) Device 1 Each by Does not apply route every day. 1 Device 0   • glucose blood (TRUE METRIX BLOOD GLUCOSE TEST) strip 1 Strip by Other route as needed. Test daily fasting in am and if symptoms 50 Strip 5   • Lancets Misc Lancets for use with glucometer, daily and if symptoms 100 Each 5   • atorvastatin (LIPITOR) 40 MG Tab TAKE 1 TABLET ORALLY ONCE DAILY 30 Tab 11   • albuterol (PROVENTIL) 2.5mg/3ml Nebu Soln solution for nebulization 3 mL by Nebulization route every four hours as needed for Shortness of Breath. 75 mL 3   • estradiol (VAGIFEM) 10 MCG Tab Insert 1 Tab in vagina every 3 days. 8 Tab 2   • montelukast (SINGULAIR) 10 MG Tab TAKE 1 TABLET ORALLY ONCE DAILY 30 Tab 2   • hydrocodone-acetaminophen (NORCO) 5-325 MG Tab per tablet Take 1 Tab by mouth every 8 hours as needed. 21 Tab 0   • glucose blood (TRUE METRIX BLOOD GLUCOSE TEST) strip 1 Strip by Other route as needed. 50 Strip 2   • Blood Glucose Monitoring Suppl (TRUE METRIX AIR GLUCOSE METER) Device 1 Each by Does not apply route every day. 1 Device 0   • TECHLITE LANCETS Misc      • TRUE METRIX BLOOD GLUCOSE TEST strip FINGER STICK BLOOD SUGAR ONCE DAILY FASTING AND IF SYMPTOMATIC 50 Strip 11   • LUMIGAN 0.01 % Solution 1 Drop every bedtime.       No current facility-administered medications for this visit.        Social History   Substance Use Topics   • Smoking status: Former Smoker     Years: 5.00   • Smokeless tobacco: Never Used   • Alcohol use Not on file       Past Medical History:   Diagnosis Date   • Asthma    • Diabetes (HCC)    • Hypertension    • Spherocytosis (familial) (HCC)    • Spherocytosis (familial) (HCC) 8/28/2018       Family History   Problem Relation Age of Onset   • Diabetes Mother    • Heart Disease Mother        ROS:  Review of Systems   See  HPI Above    Exam:  Blood pressure 130/80, pulse 86, temperature 36.4 °C (97.5 °F), temperature source Temporal, resp. rate 20, height 1.524 m (5'), weight 109.3 kg (241 lb), last menstrual period 01/22/2010, SpO2 95 %, not currently breastfeeding. Body mass index is 47.07 kg/m².    General:  Well nourished, Obese., well developed female in NAD  HENT:Head is grossly normal. PERRL.  Neck: Supple. Trachea is midline.  Pulmonary: Clear to ausculation .  Normal effort. No rales, ronchi, or wheezing.   Cardiovascular: Regular rate and rhythm.  Abdomen-Abdomen is soft  Upper extremities- MAEW, Good ROM  Lower extremities- neg for edema, redness  Neuro- A & O x 4. Speech clear and appropriate.    Current medications, allergies, and problem list reviewed with patient and updated in EPIC today.    Assessment/Plan:  1. Chronic pain of right knee  [facility] PAIN MANAGEMENT SCREEN    Controlled Substance Treatment Agreement    tramadol (ULTRAM) 50 MG Tab Nov--> Dec    tramadol (ULTRAM) 50 MG Tab Dec--> Jan    Consent for Opiate Prescription   2. Symptoms of upper respiratory infection (URI)  To stay well hydrated, wash hands often.  Sterilize Nebulizer prior to use as her room-mate has been using it and room-mate is sick.   3. Chronic use of opiate for therapeutic purpose  [facility] PAIN MANAGEMENT SCREEN    Controlled Substance Treatment Agreement    tramadol (ULTRAM) 50 MG Tab    tramadol (ULTRAM) 50 MG Tab    Consent for Opiate Prescription   4. Moderate persistent asthma without complication  Spacer/Aero-Holding Chambers (BREATHERITE HAROON SPACER ADULT) Misc   5. Bronchitis  doxycycline (MONODOX) 100 MG capsule   6. Essential hypertension  Continue Valsartan.       Return in about 2 months (around 1/20/2019) for Pain Med F/U.

## 2018-12-24 RX ORDER — MONTELUKAST SODIUM 10 MG/1
TABLET ORAL
Qty: 30 TAB | Refills: 2 | Status: SHIPPED | OUTPATIENT
Start: 2018-12-24 | End: 2019-04-05 | Stop reason: SDUPTHER

## 2019-01-17 ENCOUNTER — OFFICE VISIT (OUTPATIENT)
Dept: MEDICAL GROUP | Facility: MEDICAL CENTER | Age: 59
End: 2019-01-17
Attending: NURSE PRACTITIONER
Payer: MEDICAID

## 2019-01-17 VITALS
HEIGHT: 60 IN | DIASTOLIC BLOOD PRESSURE: 70 MMHG | TEMPERATURE: 97.9 F | RESPIRATION RATE: 16 BRPM | OXYGEN SATURATION: 95 % | SYSTOLIC BLOOD PRESSURE: 122 MMHG | WEIGHT: 250 LBS | HEART RATE: 84 BPM | BODY MASS INDEX: 49.08 KG/M2

## 2019-01-17 DIAGNOSIS — R51.9 NONINTRACTABLE EPISODIC HEADACHE, UNSPECIFIED HEADACHE TYPE: ICD-10-CM

## 2019-01-17 DIAGNOSIS — N39.41 URGE INCONTINENCE OF URINE: ICD-10-CM

## 2019-01-17 DIAGNOSIS — Z13.21 ENCOUNTER FOR VITAMIN DEFICIENCY SCREENING: ICD-10-CM

## 2019-01-17 DIAGNOSIS — M25.561 CHRONIC PAIN OF RIGHT KNEE: ICD-10-CM

## 2019-01-17 DIAGNOSIS — N95.9 POST MENOPAUSAL PROBLEMS: ICD-10-CM

## 2019-01-17 DIAGNOSIS — E11.9 TYPE 2 DIABETES MELLITUS WITHOUT COMPLICATION, WITHOUT LONG-TERM CURRENT USE OF INSULIN (HCC): ICD-10-CM

## 2019-01-17 DIAGNOSIS — G89.29 CHRONIC PAIN OF RIGHT KNEE: ICD-10-CM

## 2019-01-17 DIAGNOSIS — E66.01 MORBID OBESITY WITH BMI OF 45.0-49.9, ADULT (HCC): ICD-10-CM

## 2019-01-17 DIAGNOSIS — J45.30 MILD PERSISTENT ASTHMA, UNCOMPLICATED: ICD-10-CM

## 2019-01-17 DIAGNOSIS — Z01.419 ENCOUNTER FOR ROUTINE GYNECOLOGICAL EXAMINATION WITH PAPANICOLAOU SMEAR OF CERVIX: ICD-10-CM

## 2019-01-17 DIAGNOSIS — G44.039 NONINTRACTABLE PAROXYSMAL HEMICRANIA, UNSPECIFIED CHRONICITY PATTERN: ICD-10-CM

## 2019-01-17 PROCEDURE — 99214 OFFICE O/P EST MOD 30 MIN: CPT | Performed by: NURSE PRACTITIONER

## 2019-01-17 RX ORDER — DIAZEPAM 2 MG/1
TABLET ORAL
Qty: 3 TAB | Refills: 0 | Status: SHIPPED | OUTPATIENT
Start: 2019-01-17 | End: 2019-01-23

## 2019-01-17 RX ORDER — OXYBUTYNIN CHLORIDE 10 MG/1
10 TABLET, EXTENDED RELEASE ORAL DAILY
Qty: 30 TAB | Refills: 2 | Status: SHIPPED | OUTPATIENT
Start: 2019-01-17 | End: 2019-04-15 | Stop reason: SDUPTHER

## 2019-01-17 RX ORDER — ALBUTEROL SULFATE 90 UG/1
AEROSOL, METERED RESPIRATORY (INHALATION)
Qty: 1 INHALER | Refills: 2 | Status: SHIPPED | OUTPATIENT
Start: 2019-01-17 | End: 2020-03-06

## 2019-01-17 RX ORDER — GLIPIZIDE 5 MG/1
TABLET ORAL
Qty: 60 TAB | Refills: 2 | Status: SHIPPED | OUTPATIENT
Start: 2019-01-17 | End: 2019-03-05

## 2019-01-17 RX ORDER — BUTALBITAL, ACETAMINOPHEN AND CAFFEINE 50; 325; 40 MG/1; MG/1; MG/1
1 TABLET ORAL EVERY 12 HOURS PRN
Qty: 20 TAB | Refills: 0 | Status: SHIPPED | OUTPATIENT
Start: 2019-01-17 | End: 2019-02-06

## 2019-01-17 NOTE — ASSESSMENT & PLAN NOTE
Chronic right knee pain and intermittent injections by her Orthopedic MD, Dr Hernandez.  At last visit we agreed to a small amt of Tramadol per month to help w break through pain, and she signed consent and gave UDS which was consistent.  Discussed ongoing knee pain  Discussed refill 3 months at small qty per month, but pt reports does not need it.  Recommend she f/u w Dr Ospina for intermittent injections and if more severe pain to discuss surgical or other options.

## 2019-01-17 NOTE — ASSESSMENT & PLAN NOTE
"Hx of Episodic HA in past which were \"sharp\" pains  Reports in past MRI was neg for Trigeminal neuralgia.  Tegretol R has helped minimize her headaches.    Today she reports 8 yrs ago it started on left temple pain  Tx by MD w Tegretol for possible trigeminal neuralgia  And MRI neg but Tegretol has helped..  REports increase in amt 3x/wk w 'sharp\" pain to left temple  Lasts about a moment and very intense.  Worried it has progressed.  No assoc n/v or photophobia.    This w/e \"lasted all day\".  None now.  "

## 2019-01-17 NOTE — PROGRESS NOTES
Chief Complaint:   Chief Complaint   Patient presents with   • Pain     knee, follow up   • Referral Needed     neurology- headaches       HPI:  Natalie is here today for a follow-up on Chronic pain issues.    PMH includes:  Situational Stress  Asthma  HTN  Hyperlipidemia  Obesity  Vitamin D Deficiency  DM-2  Right Knee DJD  Chronic Knee Pain  Splenectomy  Tonsillectomy    Cholecystectomy  Chronic Hoarse Throat  FIT Stool TEST positive (2017 and 10/2017)  Left calf strain  Easy bruising to hands  Slightly elevated Platelets        Established with    Orthopedics- Dr Ospina ([facility])  [facility]     Recent Referral Approved;  GYN- DR Errol Jordan     [place]  Report:  10/23/18 Tramadol 50 mg # 14 to last 30 days  3/20/18 Tramadol 50 mg # 14 by DR Ospina  6 RX and 2 Prescriber's in report  ---------------------------------------------------------------     Review of Records shows:  18 [facility] UDS- Positive for Venlafaxine, Neg for Tramadol  (rare use) and no inconsistencies.  18 F/u on Chronic Pain, tramadol refill for rare use.  URI symptoms, asthma concern.  [facility] UDS, Controlled Substance Agreement Signed.  RX Tramadol Nov--> Dec, And Dec--->.  RX Doxycycline, Valsartan refill, Spacer RX for Inhaler.    18 Clinic Appt w Dr Louise for Bronchitis  Wheezing. RX Symbicort, To increase Atrovent use to 2-4 per day.  10/23/18 Clinic for U/S results, Lab results. Chronic Right knee pain asking for Tramadol RX from me instead of from Dr Ospina( Orthopedics).  RX Tramadol, discussed if needs monthly will  Need to sign agreement and provide Urine for UDS  At next appt.     18 LAb REsults CMP normal except k+= 3.5, BS= 139, GFR normal, A1C= 6.3 (~ 134, hx of Pre DM), Vit B12 = 467, Vit D= 39     18 U/S Abd REport(DR De La Paz ordered):  1.  There is a large amount of gas in the splenic fossa. No gross accessory spleen can be appreciated. Given limited visibility, CT may be  "helpful for more complete evaluation for accessory spleen.  2.  2.6 cm diameter left renal cyst.  3.  Hepatomegaly.     8/28/18 Hematology Appt with  Ana Cristina for f/u on easy bruising and hx of splenectomy.  ---> Ordered Abd U/S to look for accessory Spleen     7/31/18 Clinic visit for f/u on Hot Flashes, easy bruising.  --> Stop Prozac, Start Venlafaxine, to see GYN in October regarding these symptoms.  F/u Labs ordered.       Right knee pain  Chronic right knee pain and intermittent injections by her Orthopedic MD, Dr Hernandez.  At last visit we agreed to a small amt of Tramadol per month to help w break through pain, and she signed consent and gave UDS which was consistent.  Discussed ongoing knee pain  Discussed refill 3 months at small qty per month, but pt reports does not need it(Tramadol) as has some left at home  Recommend she f/u w Dr Ospina for intermittent injections and if more severe pain to discuss surgical or other options.    Episodic headache  Hx of Episodic HA in past which were \"sharp\" pains  Reports in past MRI was neg for Trigeminal neuralgia.  Tegretol R has helped minimize her headaches.    Today she reports 8 yrs ago it started on left temple pain  Tx by MD mnior Tegretol for possible trigeminal neuralgia  And MRI neg but Tegretol has helped..  REports increase in amt 3x/wk w 'sharp\" pain to left temple  Lasts about a moment and very intense.  Worried it has progressed.  No assoc n/v or photophobia.    This w/e \"lasted all day\".  None now.    Urge incontinence of urine/Abnormal feeling at meatus.  REports urge incontinence greater than 4 years.  Asking for Vesicare but not covered by Insurance.  Recommend referral to Urology.    Also reports she thinks her uterus is \"sagging\" as feels \"somethin\" when  She wipes after urinating.  Denies dysuria or an odor to her urine.    Discussed possibility of Bladder Prolapse/Uterus prolapse and recommend we  Look into this further    Patient Active " Problem List    Diagnosis Date Noted   • Urge incontinence of urine 01/17/2019   • Symptoms of upper respiratory infection (URI) 11/20/2018   • Spherocytosis (familial) ([facility]) 08/28/2018   • Visit for wound check 07/03/2018   • Hot flashes 06/05/2018   • Thrombocytosis ([facility]) 05/16/2018   • Easy bruising 05/08/2018   • Encounter for routine gynecological examination with Papanicolaou smear of cervix 03/28/2018   • Laceration of left index finger 03/12/2018   • Routine adult health maintenance 03/12/2018   • Cough 12/19/2017   • Stress incontinence of urine 11/29/2017   • Burn, thumb, first degree, left, initial encounter 11/13/2017   • Right calf pain 11/01/2017   • Fecal occult blood test positive 10/04/2017   • Injury of right lower extremity 08/22/2017   • Environmental allergies 03/30/2017   • Morbid obesity with BMI of 45.0-49.9, adult ([facility]) 02/16/2017   • Mood changes 12/13/2016   • Hoarseness, persistent 07/07/2016   • Insomnia 07/07/2016   • Hyperlipidemia 06/09/2016   • Osteoarthritis of knee 04/14/2016   • Vision problems 03/24/2016   • Right knee pain 03/24/2016   • Vitamin D deficiency 11/12/2015   • Type 2 diabetes mellitus without complication ([facility]) 11/12/2015   • Essential hypertension 10/22/2015   • Moderate persistent asthma 10/22/2015   • Episodic headache 10/22/2015   • H/O splenectomy 10/22/2015       Allergies:Voltaren [diclofenac-disod edta]    Medicines as of today:  Current Outpatient Prescriptions   Medication Sig Dispense Refill   • oxybutynin SR (DITROPAN-XL) 10 MG CR tablet Take 1 Tab by mouth every day. 30 Tab 2   • diazePAM (VALIUM) 2 MG Tab Take 2 mg 45 minutes prior to MRI  May repeat x 1 every 45 minutes prn 3 Tab 0   • acetaminophen/caffeine/butalbital 325-40-50 mg (FIORICET) -40 MG Tab Take 1 Tab by mouth every 12 hours as needed for Headache for up to 30 days. 20 Tab 0   • montelukast (SINGULAIR) 10 MG Tab TAKE 1 TABLET ORALLY ONCE DAILY 30 Tab 2   • amLODIPine (NORVASC) 10 MG  Tab TAKE 1 TABLET ORALLY ONCE DAILY 30 Tab 5   • hydrOXYzine HCl (ATARAX) 25 MG Tab TAKE 1 TABLET ORALLY NIGHTLY AT BEDTIME IF NEEDED FOR ANXIETY/INSOMNIA 30 Tab 5   • venlafaxine XR (EFFEXOR XR) 37.5 MG CAPSULE SR 24 HR TAKE 1 CAPSULE ORALLY ONCE DAILY 30 Cap 5   • Spacer/Aero-Holding Chambers (BREATHERITE HAROON SPACER ADULT) Misc Spacer for use with Inhaler Generic or Brand name as substitution if covered by insurance 1 Each 1   • doxycycline (MONODOX) 100 MG capsule Take 1 Cap by mouth 2 times a day. 14 Cap 0   • ATROVENT HFA 17 MCG/ACT Aero Soln INHALE 2 PUFFS ORALLY EVERY 6 HOURS 1 Inhaler 0   • budesonide-formoterol (SYMBICORT) 160-4.5 MCG/ACT Aerosol Inhale 2 Puffs by mouth 2 Times a Day. 1 Inhaler 0   • valsartan (DIOVAN) 160 MG Tab TAKE 1 TABLET ORALLY ONCE DAILY 30 Tab 5   • carBAMazepine (TEGRETOL) 200 MG Tab TAKE 1 TABLET ORALLY IN THE MORNING AND TAKE 2 TABLETS ORALLY IN THE EVENING 90 Tab 5   • glipiZIDE (GLUCOTROL) 5 MG Tab TAKE 1 TABLET ORALLY 2 TIMES DAILY 60 Tab 2   • VENTOLIN  (90 Base) MCG/ACT Aero Soln inhalation aerosol Inhale 2 Puffs by mouth every 6 hours as needed for Shortness of Breath. 1 Inhaler 2   • ipratropium (ATROVENT) 17 MCG/ACT Aero Soln Inhale 2 Puffs by mouth every 6 hours. 17 g 5   • FLONASE ALLERGY RELIEF 50 MCG/ACT nasal spray SPRAY 1 SPRAY IN EACH NOSTRIL ONCE DAILY *60 DAY SUPPLY* 1 Bottle 2   • D3 SUPER STRENGTH 2000 units Cap TAKE 2 CAPSULES ORALLY (4000 UN) ONCE DAILY 60 Cap 4   • loratadine (CLARITIN) 10 MG Tab Take 10 mg by mouth every day.     • Lancets Misc Lancets for use with glucometer, daily and if symptoms 100 Each 5   • atorvastatin (LIPITOR) 40 MG Tab TAKE 1 TABLET ORALLY ONCE DAILY 30 Tab 11   • albuterol (PROVENTIL) 2.5mg/3ml Nebu Soln solution for nebulization 3 mL by Nebulization route every four hours as needed for Shortness of Breath. 75 mL 3   • estradiol (VAGIFEM) 10 MCG Tab Insert 1 Tab in vagina every 3 days. 8 Tab 2   •  hydrocodone-acetaminophen (NORCO) 5-325 MG Tab per tablet Take 1 Tab by mouth every 8 hours as needed. 21 Tab 0   • glucose blood (TRUE METRIX BLOOD GLUCOSE TEST) strip 1 Strip by Other route as needed. 50 Strip 2   • Blood Glucose Monitoring Suppl (TRUE METRIX AIR GLUCOSE METER) Device 1 Each by Does not apply route every day. 1 Device 0   • TECHLITE LANCETS Misc      • LUMIGAN 0.01 % Solution 1 Drop every bedtime.       No current facility-administered medications for this visit.        Social History   Substance Use Topics   • Smoking status: Former Smoker     Years: 5.00   • Smokeless tobacco: Never Used   • Alcohol use Not on file       Past Medical History:   Diagnosis Date   • Asthma    • Diabetes (HCC)    • Hypertension    • Spherocytosis (familial) (HCC)    • Spherocytosis (familial) (HCC) 8/28/2018       Family History   Problem Relation Age of Onset   • Diabetes Mother    • Heart Disease Mother        ROS:  Review of Systems   See HPI Above    Exam:  Blood pressure 122/70, pulse 84, temperature 36.6 °C (97.9 °F), temperature source Temporal, resp. rate 16, height 1.524 m (5'), weight 113.4 kg (250 lb), last menstrual period 01/22/2010, SpO2 95 %, not currently breastfeeding. Body mass index is 48.82 kg/m².    General:  Well nourished, well developed female in NAD  HENT:Head is grossly normal. PERRL. No temporal artery tenderness to palpation bilat  Neck: Supple. Trachea is midline.  Pulmonary: Clear to ausculation .  Normal effort. No rales, ronchi, or wheezing.   Cardiovascular: Regular rate and rhythm.  Abdomen-Abdomen is soft, No tenderness.  Upper extremities- Strong = . Good ROM  Lower extremities- neg for edema, redness, tenderness.  Neuro- A & O x 4. Speech clear and appropriate.    Current medications, allergies, and problem list reviewed with patient and updated in [facility] today.    Assessment/Plan:  1. Chronic pain of right knee  Continue ice, rare use of Tramadol.  F/u Dr Ospina (Ortho)  prn   2. Nonintractable episodic headache, unspecified headache type  diazePAM (VALIUM) 2 MG Tab for MRI    acetaminophen/caffeine/butalbital 325-40-50 mg (FIORICET) -40 MG Tab  TRIAL   3. Type 2 diabetes mellitus without complication, without long-term current use of insulin ([facility])  COMP METABOLIC PANEL    HEMOGLOBIN A1C    Lipid Profile    MICROALBUMIN CREAT RATIO URINE (LAB COLLECT)   4. Morbid obesity with BMI of 45.0-49.9, adult ([facility])  TSH   5. Encounter for vitamin deficiency screening  VITAMIN D,25 HYDROXY    VITAMIN B12   6. Nonintractable paroxysmal hemicrania, unspecified chronicity pattern  MR-BRAIN-W/O    REFFERAL TO NEUROLOGY   7. Urge incontinence of urine  oxybutynin SR (DITROPAN-XL) 10 MG CR tablet    REFERRAL TO UROLOGY    REFERRAL TO GYNECOLOGY   8. Encounter for routine gynecological examination with Papanicolaou smear of cervix  REFERRAL TO GYNECOLOGY   9. Post menopausal problems  REFERRAL TO GYNECOLOGY   Discussed HA red flags. To ER with neurologic symptoms, visual disturbances, jaw claudication, fever/chills, weight loss, nuchal rigidity, temporal artery tenderness.     Return in about 8 weeks (around 3/14/2019) for Imaging Results.

## 2019-01-17 NOTE — ASSESSMENT & PLAN NOTE
REports urge incontinence greater than 4 years.  Asking for Vesicare but not covered by Insurance.

## 2019-01-25 ENCOUNTER — HOSPITAL ENCOUNTER (OUTPATIENT)
Dept: LAB | Facility: MEDICAL CENTER | Age: 59
End: 2019-01-25
Attending: NURSE PRACTITIONER
Payer: MEDICAID

## 2019-01-25 DIAGNOSIS — E11.9 TYPE 2 DIABETES MELLITUS WITHOUT COMPLICATION, WITHOUT LONG-TERM CURRENT USE OF INSULIN (HCC): ICD-10-CM

## 2019-01-25 DIAGNOSIS — Z13.21 ENCOUNTER FOR VITAMIN DEFICIENCY SCREENING: ICD-10-CM

## 2019-01-25 DIAGNOSIS — E66.01 MORBID OBESITY WITH BMI OF 45.0-49.9, ADULT (HCC): ICD-10-CM

## 2019-01-25 LAB
25(OH)D3 SERPL-MCNC: 25 NG/ML (ref 30–100)
CREAT UR-MCNC: 198.2 MG/DL
MICROALBUMIN UR-MCNC: 1.1 MG/DL
MICROALBUMIN/CREAT UR: 6 MG/G (ref 0–30)
TSH SERPL DL<=0.005 MIU/L-ACNC: 1.94 UIU/ML (ref 0.38–5.33)
VIT B12 SERPL-MCNC: 371 PG/ML (ref 211–911)

## 2019-01-25 PROCEDURE — 82607 VITAMIN B-12: CPT

## 2019-01-25 PROCEDURE — 82043 UR ALBUMIN QUANTITATIVE: CPT

## 2019-01-25 PROCEDURE — 82306 VITAMIN D 25 HYDROXY: CPT

## 2019-01-25 PROCEDURE — 36415 COLL VENOUS BLD VENIPUNCTURE: CPT

## 2019-01-25 PROCEDURE — 84443 ASSAY THYROID STIM HORMONE: CPT

## 2019-01-25 PROCEDURE — 82570 ASSAY OF URINE CREATININE: CPT

## 2019-01-31 ENCOUNTER — TELEPHONE (OUTPATIENT)
Dept: MEDICAL GROUP | Facility: MEDICAL CENTER | Age: 59
End: 2019-01-31

## 2019-01-31 ENCOUNTER — HOSPITAL ENCOUNTER (OUTPATIENT)
Dept: RADIOLOGY | Facility: MEDICAL CENTER | Age: 59
End: 2019-01-31
Attending: NURSE PRACTITIONER
Payer: MEDICAID

## 2019-01-31 DIAGNOSIS — G44.039 NONINTRACTABLE PAROXYSMAL HEMICRANIA, UNSPECIFIED CHRONICITY PATTERN: ICD-10-CM

## 2019-01-31 PROCEDURE — 70551 MRI BRAIN STEM W/O DYE: CPT

## 2019-01-31 NOTE — TELEPHONE ENCOUNTER
----- Message from JOSS Krishna sent at 1/31/2019 10:27 AM PST -----  Results reviewed. Please inform Natalie her MRI Brain w/o report shows nothing that would explain her headaches. No signs of stroke or bleeding.   Have her call [phone] to inquire about the referral we placed to Neurology r/t her headaches.  Thanks  jaspreet

## 2019-01-31 NOTE — TELEPHONE ENCOUNTER
Phone Number Called: [phone] (home)       Message: Pt. Advised, stated verbal understanding.      Left Message for patient to call back: N\A

## 2019-02-06 ENCOUNTER — HOSPITAL ENCOUNTER (OUTPATIENT)
Dept: LAB | Facility: MEDICAL CENTER | Age: 59
End: 2019-02-06
Attending: PSYCHIATRY & NEUROLOGY
Payer: MEDICAID

## 2019-02-06 ENCOUNTER — OFFICE VISIT (OUTPATIENT)
Dept: NEUROLOGY | Facility: MEDICAL CENTER | Age: 59
End: 2019-02-06
Payer: MEDICAID

## 2019-02-06 VITALS
DIASTOLIC BLOOD PRESSURE: 72 MMHG | HEART RATE: 96 BPM | WEIGHT: 248 LBS | OXYGEN SATURATION: 92 % | SYSTOLIC BLOOD PRESSURE: 160 MMHG | HEIGHT: 61 IN | TEMPERATURE: 97.4 F | BODY MASS INDEX: 46.82 KG/M2 | RESPIRATION RATE: 20 BRPM

## 2019-02-06 DIAGNOSIS — G50.0 TRIGEMINAL NEURALGIA: ICD-10-CM

## 2019-02-06 DIAGNOSIS — D58.0 SPHEROCYTOSIS (FAMILIAL) (HCC): ICD-10-CM

## 2019-02-06 LAB — CARBAMAZEPINE SERPL-MCNC: 9.2 UG/ML (ref 4–12)

## 2019-02-06 PROCEDURE — 36415 COLL VENOUS BLD VENIPUNCTURE: CPT

## 2019-02-06 PROCEDURE — 80156 ASSAY CARBAMAZEPINE TOTAL: CPT

## 2019-02-06 PROCEDURE — 99204 OFFICE O/P NEW MOD 45 MIN: CPT | Performed by: PSYCHIATRY & NEUROLOGY

## 2019-02-06 RX ORDER — CARBAMAZEPINE 200 MG/1
200 TABLET ORAL 3 TIMES DAILY
Qty: 270 TAB | Refills: 1 | Status: SHIPPED | OUTPATIENT
Start: 2019-02-06 | End: 2019-03-28

## 2019-02-06 RX ORDER — TRAMADOL HYDROCHLORIDE 50 MG/1
50 TABLET ORAL EVERY 4 HOURS PRN
COMMUNITY

## 2019-02-06 ASSESSMENT — PATIENT HEALTH QUESTIONNAIRE - PHQ9: CLINICAL INTERPRETATION OF PHQ2 SCORE: 0

## 2019-02-06 NOTE — PATIENT INSTRUCTIONS
IMPRESSION:    1. Left facial pain since 2011-- responded to Tegretol at the beginning--- kind of similar to trigeminal neuralgia  2. Spherocytosis -- spleen removal ( one sister, and 2 kids )-- pneumococcal vaccine every 5 years now  3. HTN, DM, Asthma, Overweight, Knee arthritis    PLAN/RECOMMENDATIONS:      The MRI of brain was not remarkable  We will increase Tegretol ( Tegretol could induce the hepatic enzymes -- auto-induciton, the the tegreol blood level could drop as times goes by)        Increase Tegretol to 600mg Daily, 200mg AM 400mg HS  Will check the tegretol level    If the pain persists, please let us know  ________________________________________________________________________    REFERENCE TRIGEMINAL NEURALGIA        ________________________________________________________________________          SIGNATURE:  Garrett Wu    CC:  JOSE ANTONIO Krishna.        MRI of brain 2019    1.  Mild white matter microvascular ischemic changes versus demyelination or gliosis.  Greater than expected for age.  2.  No acute stroke or evidence for hemorrhage.  3.  Megacisterna magna versus posterior fossa arachnoid cyst, of doubtful clinical significance.

## 2019-02-06 NOTE — PROGRESS NOTES
NEUROLOGY NOTE    Referring Physician  JOSS Krishna      CHIEF COMPLAINT:  Left facial sharp pain since 2011-- neuralgia-- CA  Tegretol 200mg bid was given and the pain subsided 600mg HS made her sleepy in the past   Recently the pain came back and the patient was referred  Chief Complaint   Patient presents with   • Establish Care     Headaches       PRESENT ILLNESS:   Left facial sharp pain since 2011-- neuralgia-- CA, the patient was in remission till Jan 2019---  Tegretol 200mg bid was given and the pain subsided 600mg HS made her sleepy in the past   Recently the pain came back and the patient was referred    The patient has Hx of DM, HTN, Asthma    The patient is actively losing weight, she probably lost 30 lb in the last year        PAST MEDICAL HISTORY:  Past Medical History:   Diagnosis Date   • Asthma    • Diabetes (HCC)    • Hypertension    • Spherocytosis (familial) (HCC)    • Spherocytosis (familial) (HCC) 8/28/2018       PAST SURGICAL HISTORY:  Past Surgical History:   Procedure Laterality Date   • CHOLECYSTECTOMY  1995   • SPLENECTOMY  1994   • PRIMARY C SECTION  1987   • TONSILLECTOMY  1965       FAMILY HISTORY:  Family History   Problem Relation Age of Onset   • Diabetes Mother    • Heart Disease Mother        SOCIAL HISTORY:  Social History     Social History   • Marital status: Single     Spouse name: N/A   • Number of children: N/A   • Years of education: N/A     Occupational History   •  Other     Social History Main Topics   • Smoking status: Former Smoker     Years: 5.00   • Smokeless tobacco: Never Used   • Alcohol use Not on file   • Drug use: No   • Sexual activity: Yes     Partners: Female     Other Topics Concern   • Not on file     Social History Narrative   • No narrative on file     ALLERGIES:  Allergies   Allergen Reactions   • Voltaren [Diclofenac-Disod Edta] Itching     TOBHX  History   Smoking Status   • Former Smoker   • Years: 5.00   Smokeless Tobacco   • Never Used        ALCHX  History   Alcohol use Not on file     DRUGHX  History   Drug Use No           MEDICATIONS:  Current Outpatient Prescriptions   Medication   • tramadol (ULTRAM) 50 MG Tab   • VENTOLIN  (90 Base) MCG/ACT Aero Soln inhalation aerosol   • glipiZIDE (GLUCOTROL) 5 MG Tab   • oxybutynin SR (DITROPAN-XL) 10 MG CR tablet   • montelukast (SINGULAIR) 10 MG Tab   • amLODIPine (NORVASC) 10 MG Tab   • hydrOXYzine HCl (ATARAX) 25 MG Tab   • venlafaxine XR (EFFEXOR XR) 37.5 MG CAPSULE SR 24 HR   • valsartan (DIOVAN) 160 MG Tab   • carBAMazepine (TEGRETOL) 200 MG Tab   • ipratropium (ATROVENT) 17 MCG/ACT Aero Soln   • D3 SUPER STRENGTH 2000 units Cap   • loratadine (CLARITIN) 10 MG Tab   • atorvastatin (LIPITOR) 40 MG Tab   • glucose blood (TRUE METRIX BLOOD GLUCOSE TEST) strip   • Blood Glucose Monitoring Suppl (TRUE METRIX AIR GLUCOSE METER) Device   • LUMIGAN 0.01 % Solution   • acetaminophen/caffeine/butalbital 325-40-50 mg (FIORICET) -40 MG Tab   • Spacer/Aero-Holding Chambers (BREATHERITE HAROON SPACER ADULT) Misc   • doxycycline (MONODOX) 100 MG capsule   • ATROVENT HFA 17 MCG/ACT Aero Soln   • budesonide-formoterol (SYMBICORT) 160-4.5 MCG/ACT Aerosol   • FLONASE ALLERGY RELIEF 50 MCG/ACT nasal spray   • Lancets Misc   • albuterol (PROVENTIL) 2.5mg/3ml Nebu Soln solution for nebulization   • estradiol (VAGIFEM) 10 MCG Tab   • hydrocodone-acetaminophen (NORCO) 5-325 MG Tab per tablet   • TECHLITE LANCETS Misc     No current facility-administered medications for this visit.        REVIEW OF SYSTEM:    Constitutional: Denies fevers, Denies weight changes   Eyes: Denies changes in vision, no eye pain   Ears/Nose/Throat/Mouth: Denies nasal congestion or sore throat   Cardiovascular: HTN   Respiratory: asthma  Gastrointestinal/Hepatic: Denies abdominal pain, nausea, vomiting, diarrhea, constipation or GI bleeding   Genitourinary: Denies bladder dysfunction, dysuria or frequency   Musculoskeletal/Rheum: Denies  "joint pain and swelling   Skin/Breast: Denies rash, denies breast lumps or discharge   Neurological: left facial pain  Psychiatric: denies mood disorder   Endocrine: DM  Heme/Oncology/Lymph Nodes: spherocytosis, spleen removal( at the 36yo)  Allergic/Immunologic: Denies hx of allergies         PHYSICAL AND NEUROLOGICAL EXMAINATIONS:  VITAL SIGNS: /72 (BP Location: Right arm, Patient Position: Sitting, BP Cuff Size: Adult long)   Pulse 96   Temp 36.3 °C (97.4 °F) (Temporal)   Resp 20   Ht 1.549 m (5' 1\")   Wt 112.5 kg (248 lb)   LMP 01/22/2010   SpO2 92%   BMI 46.86 kg/m²   CURRENT WEIGHT: [unfilled]  BMI: Body mass index is 46.86 kg/m².  PREVIOUS WEIGHTS:  Wt Readings from Last 25 Encounters:   02/06/19 112.5 kg (248 lb)   01/17/19 113.4 kg (250 lb)   11/20/18 109.3 kg (241 lb)   11/09/18 112 kg (247 lb)   10/23/18 109.8 kg (242 lb)   08/28/18 107.6 kg (237 lb 5.2 oz)   07/31/18 106.6 kg (235 lb)   07/03/18 110.7 kg (244 lb)   06/22/18 108.9 kg (240 lb)   06/05/18 110.7 kg (244 lb)   05/16/18 110.2 kg (242 lb 13.4 oz)   05/16/18 110.2 kg (242 lb 13.4 oz)   05/16/18 109.2 kg (240 lb 11 oz)   05/08/18 108.9 kg (240 lb)   03/28/18 106.6 kg (235 lb)   03/12/18 106.6 kg (235 lb)   12/19/17 104.3 kg (230 lb)   11/29/17 104.8 kg (231 lb)   11/13/17 106.6 kg (235 lb)   11/01/17 106.6 kg (235 lb)   10/04/17 108.4 kg (239 lb)   08/22/17 108.4 kg (239 lb)   03/30/17 113.9 kg (251 lb)   02/16/17 116.1 kg (256 lb)   12/13/16 117.9 kg (260 lb)       General appearance of patient: WDWN(+) NAD(+)    EYES  o Fundus : Papilledem(-) Exudates(-) Hemorrhage(-)  Nervous System  Orientation to time, place and person(+)  Memory normal(+)  Language: aphasia(-)  Knowledge: past(+) Current(+)  Attention(+)  Cranial Nerves  • Nerve 2: intact  • Nerve 3,4,6: intact  • Nerve 5 : intact  • Nerve 7: intact  • Nerve 8: intact  • Nerve 9 & 10: intact  • Nerve 11: intact  • Nerve 12: intact  Muscle Power and muscle tone: symmetric, normal " in upper and lower  Sensory System: Pin sensation intact(+)  Reflexes: symmetric throughout  Cerebellar Function FNP normal   Gait : Steady(+) TandemGait steady(+)  Heart and Vascular  Peripheral Vasucular system : Edema (-) Swelling(-)  RHB, Breathing sound clear  abdomen bowel sound normoactive  Extremities freely moveable  Joints no contracture       NEUROIMAGING: I reviewed the MRI/CT of brain       LAB:            IMPRESSION:    1. Left facial pain since 2011-- responded to Tegretol at the beginning--- kind of similar to trigeminal neuralgia  2. Spherocytosis -- spleen removal ( one sister, and 2 kids )-- pneumococcal vaccine every 5 years now  3. HTN, DM, Asthma, Overweight, Knee arthritis    PLAN/RECOMMENDATIONS:      The MRI of brain was not remarkable  We will increase Tegretol ( Tegretol could induce the hepatic enzymes -- auto-induciton, the the tegreol blood level could drop as times goes by)      Increase Tegretol to 600mg Daily, 200mg AM 400mg HS  Will check the tegretol level    If the pain persists, please let us know    ________________________________________________________________________    REFERENCE TRIGEMINAL NEURALGIA        ________________________________________________________________________          SIGNATURE:  Garrett Wu    CC:  AMARI KrishnaPYECENIA.        MRI of brain 2019    1.  Mild white matter microvascular ischemic changes versus demyelination or gliosis.  Greater than expected for age.  2.  No acute stroke or evidence for hemorrhage.  3.  Megacisterna magna versus posterior fossa arachnoid cyst, of doubtful clinical significance.

## 2019-02-13 DIAGNOSIS — E78.49 OTHER HYPERLIPIDEMIA: ICD-10-CM

## 2019-02-13 RX ORDER — ATORVASTATIN CALCIUM 40 MG/1
TABLET, FILM COATED ORAL
Qty: 30 TAB | Refills: 0 | Status: SHIPPED | OUTPATIENT
Start: 2019-02-13 | End: 2019-04-10 | Stop reason: SDUPTHER

## 2019-03-05 ENCOUNTER — OFFICE VISIT (OUTPATIENT)
Dept: MEDICAL GROUP | Facility: MEDICAL CENTER | Age: 59
End: 2019-03-05
Attending: FAMILY MEDICINE
Payer: MEDICAID

## 2019-03-05 VITALS
OXYGEN SATURATION: 91 % | WEIGHT: 257 LBS | SYSTOLIC BLOOD PRESSURE: 142 MMHG | HEART RATE: 92 BPM | RESPIRATION RATE: 20 BRPM | HEIGHT: 61 IN | BODY MASS INDEX: 48.52 KG/M2 | DIASTOLIC BLOOD PRESSURE: 88 MMHG | TEMPERATURE: 97.5 F

## 2019-03-05 DIAGNOSIS — J32.9 SINUSITIS, UNSPECIFIED CHRONICITY, UNSPECIFIED LOCATION: ICD-10-CM

## 2019-03-05 DIAGNOSIS — J45.30 MILD PERSISTENT ASTHMA WITHOUT COMPLICATION: ICD-10-CM

## 2019-03-05 DIAGNOSIS — I10 ESSENTIAL HYPERTENSION: ICD-10-CM

## 2019-03-05 DIAGNOSIS — E11.9 TYPE 2 DIABETES MELLITUS WITHOUT COMPLICATION, WITHOUT LONG-TERM CURRENT USE OF INSULIN (HCC): ICD-10-CM

## 2019-03-05 PROBLEM — Z00.00 ROUTINE ADULT HEALTH MAINTENANCE: Status: RESOLVED | Noted: 2018-03-12 | Resolved: 2019-03-05

## 2019-03-05 PROBLEM — Z01.419 ENCOUNTER FOR ROUTINE GYNECOLOGICAL EXAMINATION WITH PAPANICOLAOU SMEAR OF CERVIX: Status: RESOLVED | Noted: 2018-03-28 | Resolved: 2019-03-05

## 2019-03-05 PROBLEM — N39.41 URGE INCONTINENCE OF URINE: Status: RESOLVED | Noted: 2019-01-17 | Resolved: 2019-03-05

## 2019-03-05 PROBLEM — R05.9 COUGH: Status: RESOLVED | Noted: 2017-12-19 | Resolved: 2019-03-05

## 2019-03-05 PROBLEM — Z51.89 VISIT FOR WOUND CHECK: Status: RESOLVED | Noted: 2018-07-03 | Resolved: 2019-03-05

## 2019-03-05 PROBLEM — S89.91XA INJURY OF RIGHT LOWER EXTREMITY: Status: RESOLVED | Noted: 2017-08-22 | Resolved: 2019-03-05

## 2019-03-05 PROBLEM — S61.211A LACERATION OF LEFT INDEX FINGER: Status: RESOLVED | Noted: 2018-03-12 | Resolved: 2019-03-05

## 2019-03-05 PROBLEM — T23.112A: Status: RESOLVED | Noted: 2017-11-13 | Resolved: 2019-03-05

## 2019-03-05 PROBLEM — R23.3 EASY BRUISING: Status: RESOLVED | Noted: 2018-05-08 | Resolved: 2019-03-05

## 2019-03-05 PROBLEM — R09.89 SYMPTOMS OF UPPER RESPIRATORY INFECTION (URI): Status: RESOLVED | Noted: 2018-11-20 | Resolved: 2019-03-05

## 2019-03-05 PROBLEM — M79.661 RIGHT CALF PAIN: Status: RESOLVED | Noted: 2017-11-01 | Resolved: 2019-03-05

## 2019-03-05 PROCEDURE — 99214 OFFICE O/P EST MOD 30 MIN: CPT | Performed by: FAMILY MEDICINE

## 2019-03-05 PROCEDURE — 99213 OFFICE O/P EST LOW 20 MIN: CPT | Performed by: FAMILY MEDICINE

## 2019-03-05 RX ORDER — FLUTICASONE PROPIONATE 50 MCG
1 SPRAY, SUSPENSION (ML) NASAL DAILY
Qty: 16 G | Refills: 1 | Status: SHIPPED | OUTPATIENT
Start: 2019-03-05

## 2019-03-05 NOTE — PROGRESS NOTES
CC: Here to establish care, concerns about sinus problem    HPI: Established patient new to me  Natalie presents today to establish care, 59 years old female PCP left recently, past medical problems significant for hypertension, diabetes, asthma, morbid obesity, history of spherocytosis status post splenectomy, history of dyslipidemia, osteoarthritis of the right knee, seasonal allergies.  Not working at this time, reviewed medical problems past surgical history, family/social history and medications today following concerns as follow:    Sinusitis, unspecified chronicity, unspecified location  Reports having this sinus discomfort and severe dryness in the nasal passages for the past 1 week associated with mild headache and no fever or nausea vomiting or purulent nasal discharge, patient said she has high heater and usually feels very warm and she was thinking either a sinusitis or is related to the heater.  Patient denies fever denies upper respiratory tract symptoms.     Essential hypertension  Blood pressure at the office today 142/88 continues to take her amlodipine and valsartan as directed without concerns, denies chest pain or lower extremity edema or shortness of breath     Type 2 diabetes mellitus without complication  Patient with history of borderline diabetes/diabetes A1c at some point was 6.4 she was started by previous PCP on glipizide that she continues to use every day to prevent further deterioration in blood sugar control, patient reports weight gain and struggling with her weight for weight loss.  Denies hypoglycemia.     Mild persistent asthma without complication  History of mild persistent asthma uses albuterol inhaler as needed only denies any symptoms of asthma at this time but she reports a lot of times she is dryness and mild sinus pressure.  No shortness of breath or chest pain or cough at this time requesting a new prescription for her nebulizer which was broken that she uses usually with  her asthma attack    Patient Active Problem List    Diagnosis Date Noted   • Trigeminal neuralgia 02/06/2019   • Spherocytosis (familial) ([facility]) 08/28/2018   • Hot flashes 06/05/2018   • Thrombocytosis ([facility]) 05/16/2018   • Stress incontinence of urine 11/29/2017   • Fecal occult blood test positive 10/04/2017   • Environmental allergies 03/30/2017   • Morbid obesity with BMI of 45.0-49.9, adult ([facility]) 02/16/2017   • Insomnia 07/07/2016   • Hyperlipidemia 06/09/2016   • Osteoarthritis of knee 04/14/2016   • Vision problems 03/24/2016   • Right knee pain 03/24/2016   • Vitamin D deficiency 11/12/2015   • Type 2 diabetes mellitus without complication ([facility]) 11/12/2015   • Essential hypertension 10/22/2015   • Moderate persistent asthma 10/22/2015   • Episodic headache 10/22/2015   • H/O splenectomy 10/22/2015       Current Outpatient Prescriptions   Medication Sig Dispense Refill   • fluticasone (FLONASE) 50 MCG/ACT nasal spray Spray 1 Spray in nose every day. 16 g 1   • metFORMIN (GLUCOPHAGE) 500 MG Tab Take 1 Tab by mouth 2 times a day, with meals. 60 Tab 6   • Misc. Devices Misc As directed and needed 1 Application 0   • Misc. Devices Misc As directed 1 Application 0   • atorvastatin (LIPITOR) 40 MG Tab TAKE 1 TABLET ORALLY ONCE DAILY 30 Tab 0   • tramadol (ULTRAM) 50 MG Tab Take 50 mg by mouth every four hours as needed.     • carBAMazepine (TEGRETOL) 200 MG Tab Take 1 Tab by mouth 3 times a day. 270 Tab 1   • VENTOLIN  (90 Base) MCG/ACT Aero Soln inhalation aerosol INHALE 2 PUFFS ORALLY EVERY 6 HOURS IF NEEDED FOR SHORTNESS OF BREATH 1 Inhaler 2   • oxybutynin SR (DITROPAN-XL) 10 MG CR tablet Take 1 Tab by mouth every day. 30 Tab 2   • montelukast (SINGULAIR) 10 MG Tab TAKE 1 TABLET ORALLY ONCE DAILY 30 Tab 2   • amLODIPine (NORVASC) 10 MG Tab TAKE 1 TABLET ORALLY ONCE DAILY 30 Tab 5   • hydrOXYzine HCl (ATARAX) 25 MG Tab TAKE 1 TABLET ORALLY NIGHTLY AT BEDTIME IF NEEDED FOR ANXIETY/INSOMNIA 30 Tab 5   •  venlafaxine XR (EFFEXOR XR) 37.5 MG CAPSULE SR 24 HR TAKE 1 CAPSULE ORALLY ONCE DAILY 30 Cap 5   • Spacer/Aero-Holding Chambers (BREATHERITE HAROON SPACER ADULT) Misc Spacer for use with Inhaler Generic or Brand name as substitution if covered by insurance 1 Each 1   • valsartan (DIOVAN) 160 MG Tab TAKE 1 TABLET ORALLY ONCE DAILY 30 Tab 5   • FLONASE ALLERGY RELIEF 50 MCG/ACT nasal spray SPRAY 1 SPRAY IN EACH NOSTRIL ONCE DAILY *60 DAY SUPPLY* 1 Bottle 2   • D3 SUPER STRENGTH 2000 units Cap TAKE 2 CAPSULES ORALLY (4000 UN) ONCE DAILY 60 Cap 4   • Lancets Misc Lancets for use with glucometer, daily and if symptoms 100 Each 5   • albuterol (PROVENTIL) 2.5mg/3ml Nebu Soln solution for nebulization 3 mL by Nebulization route every four hours as needed for Shortness of Breath. 75 mL 3   • estradiol (VAGIFEM) 10 MCG Tab Insert 1 Tab in vagina every 3 days. (Patient not taking: Reported on 2/6/2019) 8 Tab 2   • glucose blood (TRUE METRIX BLOOD GLUCOSE TEST) strip 1 Strip by Other route as needed. 50 Strip 2   • Blood Glucose Monitoring Suppl (TRUE METRIX AIR GLUCOSE METER) Device 1 Each by Does not apply route every day. 1 Device 0   • TECHLITE LANCETS Misc        No current facility-administered medications for this visit.          Allergies as of 03/05/2019 - Reviewed 02/06/2019   Allergen Reaction Noted   • Voltaren [diclofenac-disod edta] Itching 04/14/2016        ROS: Denies any chest pain, Shortness of breath, Changes bowel or bladder, Lower extremity edema.    Physical Exam:  Gen.: Well-developed, well-nourished, morbidly obese, no apparent distress,pleasant and cooperative with the examination  Skin:  Warm and dry with good turgor. No rashes or suspicious lesions in visible areas  Eye: PERRLA, conjunctiva and sclera clear, lids normal  HEENT: Normocephalic/atraumatic, sinuses nontender with palpation, TMs clear, nares patent with pink mucosa and clear rhinorrhea, lips without lesions, oropharynx clear.  Neck: Trachea  midline,no masses or adenopathy  Thyroid: normal consistency and size. No masses or nodules. Not tender with palpation.  Cor: Regular rate and rhythm without murmur, gallop or rub.  Lungs: Respirations unlabored.Clear to auscultation with equal breath sounds bilaterally. No wheezes, rhonchi.  Abdomen: Soft nontender, here to abdomen related to her body habitus and obesity unable to assess status or masses.  Normoactive bowel sounds. No hernias.  Extremities: No cyanosis, clubbing or edema, Symmetrical without deformities or malformations. Pulses 2+ and symmetrical both upper and lower extremities  Lymphatic: No abnormal adenopathy of the neck groin or axillae.  Psych: Alert and oriented x 3.Normal affect, judgement,insight and memory.        Assessment and Plan.   59 y.o. female here to establish care    1. Sinusitis, unspecified chronicity, unspecified location  Likely related to allergic sinusitis versus effect of heat or warmth in her house, advised to use a humidifier Flonase and Claritin as directed if not resolved to come back for further evaluation and assistance assessment likely related to bacterial sinusitis at this time no need for antibiotics.  - fluticasone (FLONASE) 50 MCG/ACT nasal spray; Spray 1 Spray in nose every day.  Dispense: 16 g; Refill: 1  - Misc. Devices Misc; As directed and needed  Dispense: 1 Application; Refill: 0  - Misc. Devices Misc; As directed  Dispense: 1 Application; Refill: 0    2. Essential hypertension  controlled on medications advised to continue same regimen    3. Type 2 diabetes mellitus without complication, without long-term current use of insulin ([facility])  Advised the patient to discontinue glipizide because of its side effects of long-term hypoglycemia and also weight gain and to start metformin low dose will recheck her A1c after the use of metformin  - metFORMIN (GLUCOPHAGE) 500 MG Tab; Take 1 Tab by mouth 2 times a day, with meals.  Dispense: 60 Tab; Refill: 6    4.  Mild persistent asthma without complication  Controlled on as needed use of albuterol advised to continue same regimen no red flags at this time  - Misc. Devices Misc; As directed and needed  Dispense: 1 Application; Refill: 0  - Misc. Devices Misc; As directed  Dispense: 1 Application; Refill: 0      Please note that this dictation was created using voice recognition software. I have made every reasonable attempt to correct obvious errors but there may be errors of grammar and content that I may have overlooked prior to finalization of this note.

## 2019-03-19 ENCOUNTER — TELEPHONE (OUTPATIENT)
Dept: MEDICAL GROUP | Facility: MEDICAL CENTER | Age: 59
End: 2019-03-19

## 2019-03-19 NOTE — TELEPHONE ENCOUNTER
Pt called and left a message stating that she is still getting the sharp pains in her temple. Reports she has 6 or7 of them yesterday. Has not had one today, but feels like she is going to. Her eye is getting twitchy. Pt wondering what she should do. Please advise.

## 2019-03-20 NOTE — TELEPHONE ENCOUNTER
Patient will need to schedule an appointment for further evaluation of her concern if I am not available she can see another provider as soon as possible.  Thank you

## 2019-03-22 ENCOUNTER — OFFICE VISIT (OUTPATIENT)
Dept: MEDICAL GROUP | Facility: MEDICAL CENTER | Age: 59
End: 2019-03-22
Attending: FAMILY MEDICINE
Payer: MEDICAID

## 2019-03-22 VITALS
DIASTOLIC BLOOD PRESSURE: 96 MMHG | HEIGHT: 61 IN | TEMPERATURE: 96.8 F | HEART RATE: 88 BPM | WEIGHT: 256 LBS | BODY MASS INDEX: 48.33 KG/M2 | RESPIRATION RATE: 22 BRPM | SYSTOLIC BLOOD PRESSURE: 156 MMHG | OXYGEN SATURATION: 91 %

## 2019-03-22 DIAGNOSIS — R51.9 TEMPORAL PAIN: ICD-10-CM

## 2019-03-22 DIAGNOSIS — I10 ESSENTIAL HYPERTENSION: ICD-10-CM

## 2019-03-22 DIAGNOSIS — E11.9 TYPE 2 DIABETES MELLITUS WITHOUT COMPLICATION, WITHOUT LONG-TERM CURRENT USE OF INSULIN (HCC): ICD-10-CM

## 2019-03-22 PROCEDURE — 99213 OFFICE O/P EST LOW 20 MIN: CPT | Performed by: FAMILY MEDICINE

## 2019-03-22 PROCEDURE — 99214 OFFICE O/P EST MOD 30 MIN: CPT | Performed by: FAMILY MEDICINE

## 2019-03-22 RX ORDER — METHYLPREDNISOLONE 4 MG/1
4 TABLET ORAL DAILY
Qty: 1 KIT | Refills: 0 | Status: SHIPPED | OUTPATIENT
Start: 2019-03-22 | End: 2019-07-11

## 2019-03-22 RX ORDER — SUMATRIPTAN 25 MG/1
25 TABLET, FILM COATED ORAL
Qty: 10 TAB | Refills: 0 | Status: SHIPPED | OUTPATIENT
Start: 2019-03-22

## 2019-03-22 NOTE — PROGRESS NOTES
Chief Complaint:   Chief Complaint   Patient presents with   • Headache     temple pain        HPI: Established patient  Natalie Eugene is a 59 y.o. female who presents for evaluation of facial/temporal pain/headache     Temporal pain  Patient reports that she has long history of trigeminal neuralgia, she has been followed up by Dr. adkins neurologist, recently an MRI was done and there was no significant findings.  On carbamazepine 200 mg 3 times a day to control the pain it was increased recently by her neurologist.  She said for the past 1 week or so has been experiencing more pain on the left side temple area associated with eye twitching and feels it is not well controlled.  Does not report any dizziness or loss of consciousness or nausea or vomiting or neck stiffness no fever.  Has been taking her medication as directed per neurology.     Essential hypertension  Blood pressure at the office today elevated around 150/96 patient reports that she did not take her blood pressure medication today, she reports the headache but denies chest pain or shortness of breath or lower extremity edema or palpitations patient on amlodipine 10 mg daily.  On Diovan 160 mg daily.    Type 2 diabetes mellitus without complication, without long-term current use of insulin ([facility])    Patient was started recently on metformin 500 mg twice a day, today I encouraged her to increase the dose 2000 mg twice a day since she is tolerating the medication for better control of her blood sugar and I will recheck her A1c next visit.  Denies side effects.        Past medical history, family history, social history and medications reviewed and updated in the record. Today   Current medications, problem list and allergies reviewed in EPIC today   Health maintenance topics are reviewed and updated.    Patient Active Problem List    Diagnosis Date Noted   • Trigeminal neuralgia 02/06/2019   • Spherocytosis (familial) ([facility]) 08/28/2018   • Hot flashes  06/05/2018   • Thrombocytosis ([facility]) 05/16/2018   • Stress incontinence of urine 11/29/2017   • Fecal occult blood test positive 10/04/2017   • Environmental allergies 03/30/2017   • Morbid obesity with BMI of 45.0-49.9, adult ([facility]) 02/16/2017   • Insomnia 07/07/2016   • Hyperlipidemia 06/09/2016   • Osteoarthritis of knee 04/14/2016   • Vision problems 03/24/2016   • Right knee pain 03/24/2016   • Vitamin D deficiency 11/12/2015   • Type 2 diabetes mellitus without complication ([facility]) 11/12/2015   • Essential hypertension 10/22/2015   • Moderate persistent asthma 10/22/2015   • Episodic headache 10/22/2015   • H/O splenectomy 10/22/2015     Family History   Problem Relation Age of Onset   • Diabetes Mother    • Heart Disease Mother    • Other Mother         parkinson   • Other Father         spherocytosis    • Other Sister         spherocytosis    • Other Brother      Social History     Social History   • Marital status: Single     Spouse name: N/A   • Number of children: N/A   • Years of education: N/A     Occupational History   •  Other     Social History Main Topics   • Smoking status: Former Smoker     Years: 5.00   • Smokeless tobacco: Never Used   • Alcohol use Not on file   • Drug use: No   • Sexual activity: Not Currently     Partners: Male     Other Topics Concern   • Not on file     Social History Narrative   • No narrative on file     Current Outpatient Prescriptions   Medication Sig Dispense Refill   • MethylPREDNISolone (MEDROL DOSEPAK) 4 MG Tablet Therapy Pack Take 1 Tab by mouth every day. 1 Kit 0   • metformin (GLUCOPHAGE) 1000 MG tablet Take 1 Tab by mouth 2 times a day, with meals. 60 Tab 6   • SUMAtriptan (IMITREX) 25 MG Tab tablet Take 1 Tab by mouth Once PRN for Migraine for up to 1 dose. 10 Tab 0   • acetaminophen/caffeine/butalbital 325-40-50 mg (FIORICET) -40 MG Tab TAKE 1 TABLET ORALLY EVERY 12 HOURS IF NEEDED HEADACHE FOR UP TO 30 DAYS 20 Tab 2   • fluticasone (FLONASE) 50 MCG/ACT  nasal spray Spray 1 Spray in nose every day. 16 g 1   • Misc. Devices Misc As directed and needed 1 Application 0   • Misc. Devices Misc As directed 1 Application 0   • atorvastatin (LIPITOR) 40 MG Tab TAKE 1 TABLET ORALLY ONCE DAILY 30 Tab 0   • tramadol (ULTRAM) 50 MG Tab Take 50 mg by mouth every four hours as needed.     • carBAMazepine (TEGRETOL) 200 MG Tab Take 1 Tab by mouth 3 times a day. 270 Tab 1   • VENTOLIN  (90 Base) MCG/ACT Aero Soln inhalation aerosol INHALE 2 PUFFS ORALLY EVERY 6 HOURS IF NEEDED FOR SHORTNESS OF BREATH 1 Inhaler 2   • oxybutynin SR (DITROPAN-XL) 10 MG CR tablet Take 1 Tab by mouth every day. 30 Tab 2   • montelukast (SINGULAIR) 10 MG Tab TAKE 1 TABLET ORALLY ONCE DAILY 30 Tab 2   • amLODIPine (NORVASC) 10 MG Tab TAKE 1 TABLET ORALLY ONCE DAILY 30 Tab 5   • hydrOXYzine HCl (ATARAX) 25 MG Tab TAKE 1 TABLET ORALLY NIGHTLY AT BEDTIME IF NEEDED FOR ANXIETY/INSOMNIA 30 Tab 5   • venlafaxine XR (EFFEXOR XR) 37.5 MG CAPSULE SR 24 HR TAKE 1 CAPSULE ORALLY ONCE DAILY 30 Cap 5   • Spacer/Aero-Holding Chambers (BREATHERITE HAROON SPACER ADULT) Misc Spacer for use with Inhaler Generic or Brand name as substitution if covered by insurance 1 Each 1   • valsartan (DIOVAN) 160 MG Tab TAKE 1 TABLET ORALLY ONCE DAILY 30 Tab 5   • FLONASE ALLERGY RELIEF 50 MCG/ACT nasal spray SPRAY 1 SPRAY IN EACH NOSTRIL ONCE DAILY *60 DAY SUPPLY* 1 Bottle 2   • D3 SUPER STRENGTH 2000 units Cap TAKE 2 CAPSULES ORALLY (4000 UN) ONCE DAILY 60 Cap 4   • Lancets Misc Lancets for use with glucometer, daily and if symptoms 100 Each 5   • albuterol (PROVENTIL) 2.5mg/3ml Nebu Soln solution for nebulization 3 mL by Nebulization route every four hours as needed for Shortness of Breath. 75 mL 3   • estradiol (VAGIFEM) 10 MCG Tab Insert 1 Tab in vagina every 3 days. (Patient not taking: Reported on 2/6/2019) 8 Tab 2   • glucose blood (TRUE METRIX BLOOD GLUCOSE TEST) strip 1 Strip by Other route as needed. 50 Strip 2   • Blood  "Glucose Monitoring Suppl (TRUE METRIX AIR GLUCOSE METER) Device 1 Each by Does not apply route every day. 1 Device 0   • TECHLITE LANCETS Misc        No current facility-administered medications for this visit.            Review Of Systems  As documented in HPI above  PHYSICAL EXAMINATION:    /96 (BP Location: Left arm, Patient Position: Sitting, BP Cuff Size: Large adult)   Pulse 88   Temp 36 °C (96.8 °F) (Temporal)   Resp (!) 22   Ht 1.549 m (5' 1\")   Wt 116.1 kg (256 lb)   LMP 01/22/2010   SpO2 91%   BMI 48.37 kg/m²   Gen.: Well-developed, well-nourished, no apparent distress, pleasant and cooperative with the examination  HEENT: Normocephalic/atraumatic, sinuses nontender with palpation, TMs clear, nares patent with pink mucosa and clear rhinorrhea, oropharynx clear, no tenderness on palpating the left temporal area where the patient reports her pain  Neck: No JVD or bruits, no adenopathy  Cor: Regular rate and rhythm without murmur gallop or rub  Lungs: Clear to auscultation with equal breath sounds bilaterally. No wheezes, rhonchi.    Extremities: No cyanosis, clubbing or edema        ASSESSMENT/Plan:  1. Temporal pain   likely related to trigeminal neuralgia versus migraine headaches, symptoms are uncontrolled continue the carbamazepine 200 mg 3 times a day will add Medrol Dosepak, Imitrex as needed for headaches.  Follow-up with a neurologist as directed.  If any red flags patient report to emergency room follow-up in 2 weeks with me  MethylPREDNISolone (MEDROL DOSEPAK) 4 MG Tablet Therapy Pack    SUMAtriptan (IMITREX) 25 MG Tab tablet   2. Essential hypertension   uncontrolled for patient not taking her medication, discussed importance of taking her medication at the same time every day in the morning continue same regimen recheck blood pressure in 1-2 weeks no red flags at this time   3. Type 2 diabetes mellitus without complication, without long-term current use of insulin (HCC)   increase " the dose for the goal of 1000 twice a day for good control of her blood sugar will recheck A1c next visit.    metformin (GLUCOPHAGE) 1000 MG tablet       Please note that this dictation was created using voice recognition software. I have made every reasonable attempt to correct obvious errors but there may be errors of grammar and content that I may have overlooked prior to finalization of this note.

## 2019-03-28 ENCOUNTER — OFFICE VISIT (OUTPATIENT)
Dept: NEUROLOGY | Facility: MEDICAL CENTER | Age: 59
End: 2019-03-28
Payer: MEDICAID

## 2019-03-28 ENCOUNTER — HOSPITAL ENCOUNTER (OUTPATIENT)
Dept: LAB | Facility: MEDICAL CENTER | Age: 59
End: 2019-03-28
Attending: PSYCHIATRY & NEUROLOGY
Payer: MEDICAID

## 2019-03-28 VITALS
TEMPERATURE: 97.9 F | DIASTOLIC BLOOD PRESSURE: 70 MMHG | WEIGHT: 255.2 LBS | SYSTOLIC BLOOD PRESSURE: 132 MMHG | BODY MASS INDEX: 48.18 KG/M2 | HEART RATE: 83 BPM | OXYGEN SATURATION: 94 % | HEIGHT: 61 IN

## 2019-03-28 DIAGNOSIS — G50.0 TRIGEMINAL NEURALGIA: ICD-10-CM

## 2019-03-28 LAB
CARBAMAZEPINE SERPL-MCNC: 11.6 UG/ML (ref 4–12)
CRP SERPL HS-MCNC: 1.79 MG/DL (ref 0–0.75)
ERYTHROCYTE [SEDIMENTATION RATE] IN BLOOD BY WESTERGREN METHOD: 12 MM/HOUR (ref 0–30)

## 2019-03-28 PROCEDURE — 99214 OFFICE O/P EST MOD 30 MIN: CPT | Performed by: PSYCHIATRY & NEUROLOGY

## 2019-03-28 PROCEDURE — 80156 ASSAY CARBAMAZEPINE TOTAL: CPT

## 2019-03-28 PROCEDURE — 36415 COLL VENOUS BLD VENIPUNCTURE: CPT

## 2019-03-28 PROCEDURE — 85652 RBC SED RATE AUTOMATED: CPT

## 2019-03-28 PROCEDURE — 86140 C-REACTIVE PROTEIN: CPT

## 2019-03-28 RX ORDER — CARBAMAZEPINE 200 MG/1
400 TABLET, EXTENDED RELEASE ORAL 2 TIMES DAILY
Qty: 120 TAB | Refills: 4 | Status: SHIPPED | OUTPATIENT
Start: 2019-03-28 | End: 2020-05-22 | Stop reason: SDUPTHER

## 2019-03-28 NOTE — PROGRESS NOTES
NEUROLOGY NOTE    Referring Physician  JOSS Krishna      CHIEF COMPLAINT:  Left facial sharp pain since 2011-- neuralgia-- CA  Tegretol 200mg bid was given and the pain subsided 600mg HS made her sleepy in the past   Recently the pain came back and the patient was referred back to us  Chief Complaint   Patient presents with   • Follow-Up     Spherocytosis (familial)/Nonintractable episodic headache, unspecified headache type G44.039/Nonintractable paroxysmal hemicrania, unspecified chronicity pattern       PRESENT ILLNESS:   Left facial sharp pain since 2011-- neuralgia-- CA, the patient was in remission till Jan 2019---  Tegretol 200mg bid was given and the pain subsided 600mg HS made her sleepy in the past   Recently the pain came back and the patient was referred back to us    The patient has Hx of DM, HTN, Asthma    The patient is actively losing weight, she probably lost 30 lb in the last year        PAST MEDICAL HISTORY:  Past Medical History:   Diagnosis Date   • Asthma    • Diabetes (HCC)    • Hypertension    • Spherocytosis (familial) (HCC)    • Spherocytosis (familial) (HCC) 8/28/2018       PAST SURGICAL HISTORY:  Past Surgical History:   Procedure Laterality Date   • CHOLECYSTECTOMY  1995   • SPLENECTOMY  1994   • PRIMARY C SECTION  1987   • TONSILLECTOMY  1965       FAMILY HISTORY:  Family History   Problem Relation Age of Onset   • Diabetes Mother    • Heart Disease Mother    • Other Mother         parkinson   • Other Father         spherocytosis    • Other Sister         spherocytosis    • Other Brother        SOCIAL HISTORY:  Social History     Social History   • Marital status: Single     Spouse name: N/A   • Number of children: N/A   • Years of education: N/A     Occupational History   •  Other     Social History Main Topics   • Smoking status: Former Smoker     Years: 5.00   • Smokeless tobacco: Never Used   • Alcohol use Not on file   • Drug use: No   • Sexual activity: Not Currently        Partners: Male     Other Topics Concern   • Not on file     Social History Narrative   • No narrative on file     ALLERGIES:  Allergies   Allergen Reactions   • Voltaren [Diclofenac-Disod Edta] Itching     TOBHX  History   Smoking Status   • Former Smoker   • Years: 5.00   Smokeless Tobacco   • Never Used     ALCHX  History   Alcohol use Not on file     DRUGHX  History   Drug Use No           MEDICATIONS:  Current Outpatient Prescriptions   Medication   • metformin (GLUCOPHAGE) 1000 MG tablet   • fluticasone (FLONASE) 50 MCG/ACT nasal spray   • Misc. Devices Misc   • Misc. Devices Misc   • atorvastatin (LIPITOR) 40 MG Tab   • tramadol (ULTRAM) 50 MG Tab   • carBAMazepine (TEGRETOL) 200 MG Tab   • VENTOLIN  (90 Base) MCG/ACT Aero Soln inhalation aerosol   • oxybutynin SR (DITROPAN-XL) 10 MG CR tablet   • montelukast (SINGULAIR) 10 MG Tab   • amLODIPine (NORVASC) 10 MG Tab   • hydrOXYzine HCl (ATARAX) 25 MG Tab   • venlafaxine XR (EFFEXOR XR) 37.5 MG CAPSULE SR 24 HR   • Spacer/Aero-Holding Chambers (BREATHERITE HAROON SPACER ADULT) Misc   • valsartan (DIOVAN) 160 MG Tab   • FLONASE ALLERGY RELIEF 50 MCG/ACT nasal spray   • D3 SUPER STRENGTH 2000 units Cap   • Lancets Misc   • albuterol (PROVENTIL) 2.5mg/3ml Nebu Soln solution for nebulization   • estradiol (VAGIFEM) 10 MCG Tab   • glucose blood (TRUE METRIX BLOOD GLUCOSE TEST) strip   • Blood Glucose Monitoring Suppl (TRUE METRIX AIR GLUCOSE METER) Device   • TECHLITE LANCETS Misc   • acetaminophen/caffeine/butalbital 325-40-50 mg (FIORICET) -40 MG Tab   • MethylPREDNISolone (MEDROL DOSEPAK) 4 MG Tablet Therapy Pack   • SUMAtriptan (IMITREX) 25 MG Tab tablet     No current facility-administered medications for this visit.        REVIEW OF SYSTEM:    Constitutional: Denies fevers, Denies weight changes   Eyes: Denies changes in vision, no eye pain   Ears/Nose/Throat/Mouth: Denies nasal congestion or sore throat   Cardiovascular: HTN   Respiratory:  "asthma  Gastrointestinal/Hepatic: Denies abdominal pain, nausea, vomiting, diarrhea, constipation or GI bleeding   Genitourinary: Denies bladder dysfunction, dysuria or frequency   Musculoskeletal/Rheum: Denies joint pain and swelling   Skin/Breast: Denies rash, denies breast lumps or discharge   Neurological: left facial pain  Psychiatric: denies mood disorder   Endocrine: DM  Heme/Oncology/Lymph Nodes: spherocytosis, spleen removal( at the 36yo)  Allergic/Immunologic: Denies hx of allergies         PHYSICAL AND NEUROLOGICAL EXMAINATIONS:  VITAL SIGNS: /70   Pulse 83   Temp 36.6 °C (97.9 °F) (Temporal)   Ht 1.549 m (5' 1\")   Wt 115.8 kg (255 lb 3.2 oz)   LMP 01/22/2010   SpO2 94%   BMI 48.22 kg/m²   CURRENT WEIGHT: [unfilled]  BMI: Body mass index is 48.22 kg/m².  PREVIOUS WEIGHTS:  Wt Readings from Last 25 Encounters:   03/28/19 115.8 kg (255 lb 3.2 oz)   03/22/19 116.1 kg (256 lb)   03/05/19 116.6 kg (257 lb)   02/06/19 112.5 kg (248 lb)   01/17/19 113.4 kg (250 lb)   11/20/18 109.3 kg (241 lb)   11/09/18 112 kg (247 lb)   10/23/18 109.8 kg (242 lb)   08/28/18 107.6 kg (237 lb 5.2 oz)   07/31/18 106.6 kg (235 lb)   07/03/18 110.7 kg (244 lb)   06/22/18 108.9 kg (240 lb)   06/05/18 110.7 kg (244 lb)   05/16/18 110.2 kg (242 lb 13.4 oz)   05/16/18 110.2 kg (242 lb 13.4 oz)   05/16/18 109.2 kg (240 lb 11 oz)   05/08/18 108.9 kg (240 lb)   03/28/18 106.6 kg (235 lb)   03/12/18 106.6 kg (235 lb)   12/19/17 104.3 kg (230 lb)   11/29/17 104.8 kg (231 lb)   11/13/17 106.6 kg (235 lb)   11/01/17 106.6 kg (235 lb)   10/04/17 108.4 kg (239 lb)   08/22/17 108.4 kg (239 lb)       General appearance of patient: WDWN(+) NAD(+)    EYES  o Fundus : Papilledem(-) Exudates(-) Hemorrhage(-)  Nervous System  Orientation to time, place and person(+)  Memory normal(+)  Language: aphasia(-)  Knowledge: past(+) Current(+)  Attention(+)  Cranial Nerves  • Nerve 2: intact  • Nerve 3,4,6: intact  • Nerve 5 : intact  • Nerve 7: " intact  • Nerve 8: intact  • Nerve 9 & 10: intact  • Nerve 11: intact  • Nerve 12: intact  Muscle Power and muscle tone: symmetric, normal in upper and lower  Sensory System: Pin sensation intact(+)  Reflexes: symmetric throughout  Cerebellar Function FNP normal   Gait : Steady(+) TandemGait steady(+)  Heart and Vascular  Peripheral Vasucular system : Edema (-) Swelling(-)  RHB, Breathing sound clear  abdomen bowel sound normoactive  Extremities freely moveable  Joints no contracture       NEUROIMAGING: I reviewed the MRI/CT of brain       LAB:            IMPRESSION:    1. Left facial pain since 2011-- responded to Tegretol at the beginning--- kind of similar to trigeminal neuralgia---will exclude temporal arteritis  2. Spherocytosis -- spleen removal ( one sister, and 2 kids )-- pneumococcal vaccine every 5 years now  3. HTN, DM, Asthma, Overweight, Knee arthritis    PLAN/RECOMMENDATIONS:      We will increase Tegretol ( Tegretol could induce the hepatic enzymes -- auto-induciton, the the tegreol blood level could drop as times goes by)      Increase Tegretol to 800mg Daily, 400mg AM 400mg HS  Will check the tegretol level and ESR and CRP    If the pain persists, please let us know      We also discuss about surgical options, including gamma knife, Glycerol injection, microvascular surgery    Gamma Knife radiosurgery. In this procedure, a surgeon directs a focused dose of radiation to the root of your trigeminal nerve.    Glycerol injection. During this procedure, your doctor inserts a needle through your face and into an opening in the base of your skull.    Radiofrequency thermal lesioning. This procedure selectively destroys nerve fibers associated with  pain    REFERENCE  [email or web address]    [email or web address]    ________________________________________________________________________        ________________________________________________________________________          SIGNATURE:  Garrett Wu    CC:  JOSE ANTONIO Krishna.        MRI of brain 2019    1.  Mild white matter microvascular ischemic changes versus demyelination or gliosis.  Greater than expected for age.  2.  No acute stroke or evidence for hemorrhage.  3.  Megacisterna magna versus posterior fossa arachnoid cyst, of doubtful clinical significance.

## 2019-03-28 NOTE — PATIENT INSTRUCTIONS
IMPRESSION:    1. Left facial pain since 2011-- responded to Tegretol at the beginning--- kind of similar to trigeminal neuralgia---will exclude temporal arteritis  2. Spherocytosis -- spleen removal ( one sister, and 2 kids )-- pneumococcal vaccine every 5 years now  3. HTN, DM, Asthma, Overweight, Knee arthritis    PLAN/RECOMMENDATIONS:      We will increase Tegretol ( Tegretol could induce the hepatic enzymes -- auto-induciton, the the tegreol blood level could drop as times goes by)      Increase Tegretol to 800mg Daily, 400mg AM 400mg HS  Will check the tegretol level and ESR and CRP    If the pain persists, please let us know      We also discuss about surgical options, including gamma knife, Glycerol injection, microvascular surgery    Gamma Knife radiosurgery. In this procedure, a surgeon directs a focused dose of radiation to the root of your trigeminal nerve.    Glycerol injection. During this procedure, your doctor inserts a needle through your face and into an opening in the base of your skull.    Radiofrequency thermal lesioning. This procedure selectively destroys nerve fibers associated with pain    REFERENCE  [email or web address]    [email or web address]    ________________________________________________________________________        ________________________________________________________________________          SIGNATURE:  Garrett Wu    CC:  JOSE ANTONIO Krishna.        MRI of brain 2019    1.  Mild white matter microvascular ischemic changes versus demyelination or gliosis.  Greater than expected for age.  2.  No acute stroke or evidence for hemorrhage.  3.  Megacisterna magna versus posterior fossa arachnoid cyst, of doubtful clinical significance.

## 2019-03-29 ENCOUNTER — TELEPHONE (OUTPATIENT)
Dept: NEUROLOGY | Facility: MEDICAL CENTER | Age: 59
End: 2019-03-29

## 2019-03-29 NOTE — TELEPHONE ENCOUNTER
Pt called and LVM this morning stating that she was very drowsy all day taking her Carbamazepine SR 2 tabs BID. Pt is wanting to know if she can take all 4 tabs at night instead so she isn't drowsy all day. Please advise thank you.

## 2019-04-02 NOTE — TELEPHONE ENCOUNTER
"Called and LVM to pt and advised via mychart on documentation below:    \"Reduce to 200mg AM 400mg HS for 2 weeks,   Then could retry   400mg bid 3 weeks later \"  "

## 2019-04-05 ENCOUNTER — OFFICE VISIT (OUTPATIENT)
Dept: MEDICAL GROUP | Facility: MEDICAL CENTER | Age: 59
End: 2019-04-05
Attending: FAMILY MEDICINE
Payer: MEDICAID

## 2019-04-05 VITALS
WEIGHT: 257 LBS | HEIGHT: 61 IN | DIASTOLIC BLOOD PRESSURE: 72 MMHG | OXYGEN SATURATION: 93 % | SYSTOLIC BLOOD PRESSURE: 122 MMHG | BODY MASS INDEX: 48.52 KG/M2 | RESPIRATION RATE: 20 BRPM | TEMPERATURE: 97 F | HEART RATE: 87 BPM

## 2019-04-05 DIAGNOSIS — Z90.81 ACQUIRED ASPLENIA: ICD-10-CM

## 2019-04-05 DIAGNOSIS — M54.2 NECK PAIN: ICD-10-CM

## 2019-04-05 DIAGNOSIS — G50.0 TRIGEMINAL NEURALGIA: ICD-10-CM

## 2019-04-05 PROCEDURE — 99213 OFFICE O/P EST LOW 20 MIN: CPT | Performed by: FAMILY MEDICINE

## 2019-04-05 PROCEDURE — 99214 OFFICE O/P EST MOD 30 MIN: CPT | Performed by: FAMILY MEDICINE

## 2019-04-05 RX ORDER — CYCLOBENZAPRINE HCL 10 MG
10 TABLET ORAL
Qty: 10 TAB | Refills: 0 | Status: SHIPPED | OUTPATIENT
Start: 2019-04-05

## 2019-04-05 RX ORDER — NAPROXEN 500 MG/1
500 TABLET ORAL 2 TIMES DAILY WITH MEALS
Qty: 6 TAB | Refills: 0 | Status: SHIPPED | OUTPATIENT
Start: 2019-04-05 | End: 2019-04-15 | Stop reason: SDUPTHER

## 2019-04-05 RX ORDER — MONTELUKAST SODIUM 10 MG/1
TABLET ORAL
Qty: 30 TAB | Refills: 1 | Status: SHIPPED | OUTPATIENT
Start: 2019-04-05 | End: 2019-06-19 | Stop reason: SDUPTHER

## 2019-04-05 NOTE — PROGRESS NOTES
Chief Complaint:   Chief Complaint   Patient presents with   • Follow-Up     from neuro       HPI: Established patient  Natalie Eugene is a 59 y.o. fe/male who presents for follow-up for neuralgia pain   Neck pain  Patient reports pain for the past few weeks specifically 1-2 weeks on the left side of her neck, she said she noticed it after she woke up in the morning one day and she feels it when she stops the neck from side to side, denies any recent injury or trauma.  Denies tingling sensation or numbness to her upper extremities.  Past medical history significant for migraine and trigeminal neuralgia    Trigeminal neuralgia  Patient was seen and evaluated recently by her neurologist her Tegretol was increased.  She said she completed the Medrol Dosepak that I prescribed last visit and it improved her pain at that time and then the pain reoccurred after she steroids.  ESR and CRP without significant findings.  Ruling out possible temporal arteritis.  I have reviewed neurology records today.        Past medical history, family history, social history and medications reviewed and updated in the record.  Today  Current medications, problem list and allergies reviewed in [facility] today  Health maintenance topics are reviewed and updated.    Patient Active Problem List    Diagnosis Date Noted   • Trigeminal neuralgia 02/06/2019   • Spherocytosis (familial) ([facility]) 08/28/2018   • Hot flashes 06/05/2018   • Thrombocytosis ([facility]) 05/16/2018   • Stress incontinence of urine 11/29/2017   • Fecal occult blood test positive 10/04/2017   • Environmental allergies 03/30/2017   • Morbid obesity with BMI of 45.0-49.9, adult ([facility]) 02/16/2017   • Insomnia 07/07/2016   • Hyperlipidemia 06/09/2016   • Osteoarthritis of knee 04/14/2016   • Vision problems 03/24/2016   • Right knee pain 03/24/2016   • Vitamin D deficiency 11/12/2015   • Type 2 diabetes mellitus without complication ([facility]) 11/12/2015   • Essential hypertension 10/22/2015   •  Moderate persistent asthma 10/22/2015   • Episodic headache 10/22/2015   • H/O splenectomy 10/22/2015     Family History   Problem Relation Age of Onset   • Diabetes Mother    • Heart Disease Mother    • Other Mother         parkinson   • Other Father         spherocytosis    • Other Sister         spherocytosis    • Other Brother      Social History     Social History   • Marital status: Single     Spouse name: N/A   • Number of children: N/A   • Years of education: N/A     Occupational History   •  Other     Social History Main Topics   • Smoking status: Former Smoker     Years: 5.00   • Smokeless tobacco: Never Used   • Alcohol use Not on file   • Drug use: No   • Sexual activity: Not Currently     Partners: Male     Other Topics Concern   • Not on file     Social History Narrative   • No narrative on file         Current Outpatient Prescriptions   Medication Sig Dispense Refill   • naproxen (NAPROSYN) 500 MG Tab Take 1 Tab by mouth 2 times a day, with meals. 6 Tab 0   • cyclobenzaprine (FLEXERIL) 10 MG Tab Take 1 Tab by mouth 1 time daily as needed. 10 Tab 0   • carbamazepine SR (TEGRETOL XR) 200 MG TABLET SR 12 HR extended-release tablet Take 2 Tabs by mouth 2 times a day. 120 Tab 4   • acetaminophen/caffeine/butalbital 325-40-50 mg (FIORICET) -40 MG Tab TAKE 1 TABLET ORALLY EVERY 12 HOURS IF NEEDED HEADACHE FOR UP TO 30 DAYS 20 Tab 0   • MethylPREDNISolone (MEDROL DOSEPAK) 4 MG Tablet Therapy Pack Take 1 Tab by mouth every day. (Patient not taking: Reported on 3/28/2019) 1 Kit 0   • metformin (GLUCOPHAGE) 1000 MG tablet Take 1 Tab by mouth 2 times a day, with meals. 60 Tab 6   • SUMAtriptan (IMITREX) 25 MG Tab tablet Take 1 Tab by mouth Once PRN for Migraine for up to 1 dose. (Patient not taking: Reported on 3/28/2019) 10 Tab 0   • fluticasone (FLONASE) 50 MCG/ACT nasal spray Spray 1 Spray in nose every day. 16 g 1   • Misc. Devices Misc As directed and needed 1 Application 0   • Misc. Devices Misc As  directed 1 Application 0   • atorvastatin (LIPITOR) 40 MG Tab TAKE 1 TABLET ORALLY ONCE DAILY 30 Tab 0   • tramadol (ULTRAM) 50 MG Tab Take 50 mg by mouth every four hours as needed.     • VENTOLIN  (90 Base) MCG/ACT Aero Soln inhalation aerosol INHALE 2 PUFFS ORALLY EVERY 6 HOURS IF NEEDED FOR SHORTNESS OF BREATH 1 Inhaler 2   • oxybutynin SR (DITROPAN-XL) 10 MG CR tablet Take 1 Tab by mouth every day. 30 Tab 2   • montelukast (SINGULAIR) 10 MG Tab TAKE 1 TABLET ORALLY ONCE DAILY 30 Tab 2   • amLODIPine (NORVASC) 10 MG Tab TAKE 1 TABLET ORALLY ONCE DAILY 30 Tab 5   • hydrOXYzine HCl (ATARAX) 25 MG Tab TAKE 1 TABLET ORALLY NIGHTLY AT BEDTIME IF NEEDED FOR ANXIETY/INSOMNIA 30 Tab 5   • venlafaxine XR (EFFEXOR XR) 37.5 MG CAPSULE SR 24 HR TAKE 1 CAPSULE ORALLY ONCE DAILY 30 Cap 5   • Spacer/Aero-Holding Chambers (BREATHERITE HAROON SPACER ADULT) Misc Spacer for use with Inhaler Generic or Brand name as substitution if covered by insurance 1 Each 1   • valsartan (DIOVAN) 160 MG Tab TAKE 1 TABLET ORALLY ONCE DAILY 30 Tab 5   • FLONASE ALLERGY RELIEF 50 MCG/ACT nasal spray SPRAY 1 SPRAY IN EACH NOSTRIL ONCE DAILY *60 DAY SUPPLY* 1 Bottle 2   • D3 SUPER STRENGTH 2000 units Cap TAKE 2 CAPSULES ORALLY (4000 UN) ONCE DAILY 60 Cap 4   • Lancets Misc Lancets for use with glucometer, daily and if symptoms 100 Each 5   • albuterol (PROVENTIL) 2.5mg/3ml Nebu Soln solution for nebulization 3 mL by Nebulization route every four hours as needed for Shortness of Breath. 75 mL 3   • estradiol (VAGIFEM) 10 MCG Tab Insert 1 Tab in vagina every 3 days. 8 Tab 2   • glucose blood (TRUE METRIX BLOOD GLUCOSE TEST) strip 1 Strip by Other route as needed. 50 Strip 2   • Blood Glucose Monitoring Suppl (TRUE METRIX AIR GLUCOSE METER) Device 1 Each by Does not apply route every day. 1 Device 0   • TECHLITE LANCETS Misc        No current facility-administered medications for this visit.        Review Of Systems  As documented in HPI  "above  PHYSICAL EXAMINATION:    /72 (BP Location: Left arm, Patient Position: Sitting, BP Cuff Size: Large adult)   Pulse 87   Temp 36.1 °C (97 °F) (Temporal)   Resp 20   Ht 1.549 m (5' 1\")   Wt 116.6 kg (257 lb)   LMP 01/22/2010   SpO2 93%   BMI 48.56 kg/m²   Gen.: Well-developed, well-nourished, no apparent distress, pleasant and cooperative with the examination  HEENT: Normocephalic/atraumatic, sinuses nontender with palpation, TMs clear, nares patent with pink mucosa and clear rhinorrhea, oropharynx clear  Neck: No JVD or bruits, no adenopathy, no evidence of tenderness or deformity or neck rigidity.  Patient is able to do all the cement and neck movement without any pain.  Cor: Regular rate and rhythm without murmur gallop or rub    Extremities: No cyanosis, clubbing or edema          ASSESSMENT/Plan:  1. Neck pain   new pain   possibly muscular pain advised to use anti-inflammatory naproxen and muscle relaxant as directed if not resolved to come back for reassessment    naproxen (NAPROSYN) 500 MG Tab    cyclobenzaprine (FLEXERIL) 10 MG Tab   2. Trigeminal neuralgia   patient to continue taking the increased dose of Tegretol as directed by neurology and follow-up with neurology as directed no red flags at this time ruled out temporal arthritis.       Please note that this dictation was created using voice recognition software. I have made every reasonable attempt to correct obvious errors but there may be errors of grammar and content that I may have overlooked prior to finalization of this note.       "

## 2019-06-07 ENCOUNTER — TELEPHONE (OUTPATIENT)
Dept: MEDICAL GROUP | Facility: MEDICAL CENTER | Age: 59
End: 2019-06-07

## 2019-06-07 NOTE — TELEPHONE ENCOUNTER
Phone number is disconnected.  Emailed patient about no show to appointment today 6/7/19.  Explained this was her first no show and the no show policy.

## 2019-07-05 ENCOUNTER — OFFICE VISIT (OUTPATIENT)
Dept: MEDICAL GROUP | Facility: MEDICAL CENTER | Age: 59
End: 2019-07-05
Attending: FAMILY MEDICINE
Payer: MEDICAID

## 2019-07-05 VITALS
HEART RATE: 78 BPM | TEMPERATURE: 97.1 F | SYSTOLIC BLOOD PRESSURE: 134 MMHG | HEIGHT: 61 IN | DIASTOLIC BLOOD PRESSURE: 82 MMHG | BODY MASS INDEX: 47.58 KG/M2 | WEIGHT: 252 LBS | RESPIRATION RATE: 20 BRPM | OXYGEN SATURATION: 95 %

## 2019-07-05 DIAGNOSIS — T78.40XA ALLERGIC DISORDER, INITIAL ENCOUNTER: ICD-10-CM

## 2019-07-05 DIAGNOSIS — J04.0 LARYNGITIS: ICD-10-CM

## 2019-07-05 PROCEDURE — 99214 OFFICE O/P EST MOD 30 MIN: CPT | Performed by: FAMILY MEDICINE

## 2019-07-05 RX ORDER — LORATADINE 10 MG/1
10 TABLET, ORALLY DISINTEGRATING ORAL DAILY
Qty: 30 TAB | Refills: 1 | Status: SHIPPED | OUTPATIENT
Start: 2019-07-05 | End: 2019-09-06

## 2019-07-05 RX ORDER — MONTELUKAST SODIUM 10 MG/1
10 TABLET ORAL DAILY
Qty: 30 TAB | Refills: 6 | Status: SHIPPED | OUTPATIENT
Start: 2020-01-15 | End: 2020-12-09 | Stop reason: SDUPTHER

## 2019-07-05 RX ORDER — FLUTICASONE PROPIONATE 50 MCG
1 SPRAY, SUSPENSION (ML) NASAL DAILY
Qty: 16 G | Refills: 1 | Status: SHIPPED | OUTPATIENT
Start: 2019-07-05

## 2019-07-05 NOTE — PROGRESS NOTES
Chief Complaint:   Chief Complaint   Patient presents with   • Laryngitis      voice lost x3 weeks       HPI:Established patient   Natalie Eugene is a 59 y.o. female who presents for concerns about change in her voice for the past 3 weeks      Change in her voice   Patient reports 3 weeks ago she moved in with her daughter and they started cleaning the baseboard where she said there was black discoloration and mold that they had to to clean with chemicals and Clorox.  Patient said few days after that she started to have discomfort in her throat and hoarseness of voice that has been continuing until this time.  She did not use any type of medications she thinks she might be allergic to mold and she would like to be tested for that.  Patient denied any other systemic infection signs or symptoms like fever or cough or sputum changes or shortness of breath or cough   Allergic disorder, initial encounter    History of allergies in the past recently exposed to molds and chemicals to clean it, has been experiencing horsing of voice and possible allergy symptoms.  Denies chest pain discomfort or shortness of breath        Past medical history, family history, social history and medications reviewed and updated in the record. Today   Current medications, problem list and allergies reviewed in [facility] today   Health maintenance topics are reviewed and updated.    Patient Active Problem List    Diagnosis Date Noted   • Acquired asplenia 04/05/2019     Priority: High   • Spherocytosis (familial) ([facility]) 08/28/2018     Priority: Low   • Trigeminal neuralgia 02/06/2019   • Hot flashes 06/05/2018   • Thrombocytosis ([facility]) 05/16/2018   • Stress incontinence of urine 11/29/2017   • Fecal occult blood test positive 10/04/2017   • Environmental allergies 03/30/2017   • Morbid obesity with BMI of 45.0-49.9, adult ([facility]) 02/16/2017   • Insomnia 07/07/2016   • Hyperlipidemia 06/09/2016   • Osteoarthritis of knee 04/14/2016   • Vision  problems 03/24/2016   • Right knee pain 03/24/2016   • Vitamin D deficiency 11/12/2015   • Type 2 diabetes mellitus without complication ([facility]) 11/12/2015   • Essential hypertension 10/22/2015   • Moderate persistent asthma 10/22/2015   • Episodic headache 10/22/2015   • H/O splenectomy 10/22/2015     Family History   Problem Relation Age of Onset   • Diabetes Mother    • Heart Disease Mother    • Other Mother         parkinson   • Other Father         spherocytosis    • Other Sister         spherocytosis    • Other Brother      Social History     Social History   • Marital status: Single     Spouse name: N/A   • Number of children: N/A   • Years of education: N/A     Occupational History   •  Other     Social History Main Topics   • Smoking status: Former Smoker     Years: 5.00   • Smokeless tobacco: Never Used   • Alcohol use Not on file   • Drug use: No   • Sexual activity: Not Currently     Partners: Male     Other Topics Concern   • Not on file     Social History Narrative   • No narrative on file     Current Outpatient Prescriptions   Medication Sig Dispense Refill   • loratadine (CLARITIN REDITABS) 10 MG dissolvable tablet Take 1 Tab by mouth every day. 30 Tab 1   • montelukast (SINGULAIR) 10 MG Tab Take 1 Tab by mouth every day. 30 Tab 6   • fluticasone (FLONASE) 50 MCG/ACT nasal spray Spray 1 Spray in nose every day. 16 g 1   • oxybutynin SR (DITROPAN-XL) 10 MG CR tablet TAKE 1 TABLET ORALLY ONCE DAILY 30 Tab 6   • venlafaxine XR (EFFEXOR XR) 37.5 MG CAPSULE SR 24 HR TAKE 1 CAPSULE ORALLY ONCE DAILY 30 Cap 2   • hydrOXYzine HCl (ATARAX) 25 MG Tab TAKE 1 TABLET ORALLY NIGHTLY AT BEDTIME IF NEEDED FOR ANXIETY/INSOMNIA 30 Tab 2   • montelukast (SINGULAIR) 10 MG Tab TAKE 1 TABLET ORALLY ONCE DAILY 30 Tab 6   • amLODIPine (NORVASC) 10 MG Tab TAKE 1 TABLET ORALLY ONCE DAILY 30 Tab 2   • valsartan (DIOVAN) 160 MG Tab TAKE 1 TABLET ORALLY ONCE DAILY 30 Tab 2   • naproxen (NAPROSYN) 500 MG Tab TAKE 1 TABLET ORALLY 2  TIMES DAILY WITH MEALS 6 Tab 0   • atorvastatin (LIPITOR) 40 MG Tab TAKE 1 TABLET ORALLY ONCE DAILY 30 Tab 6   • cyclobenzaprine (FLEXERIL) 10 MG Tab Take 1 Tab by mouth 1 time daily as needed. 10 Tab 0   • carbamazepine SR (TEGRETOL XR) 200 MG TABLET SR 12 HR extended-release tablet Take 2 Tabs by mouth 2 times a day. 120 Tab 4   • metformin (GLUCOPHAGE) 1000 MG tablet Take 1 Tab by mouth 2 times a day, with meals. 60 Tab 6   • SUMAtriptan (IMITREX) 25 MG Tab tablet Take 1 Tab by mouth Once PRN for Migraine for up to 1 dose. 10 Tab 0   • tramadol (ULTRAM) 50 MG Tab Take 50 mg by mouth every four hours as needed.     • VENTOLIN  (90 Base) MCG/ACT Aero Soln inhalation aerosol INHALE 2 PUFFS ORALLY EVERY 6 HOURS IF NEEDED FOR SHORTNESS OF BREATH 1 Inhaler 2   • Spacer/Aero-Holding Chambers (BREATHERITE HAROON SPACER ADULT) Misc Spacer for use with Inhaler Generic or Brand name as substitution if covered by insurance 1 Each 1   • FLONASE ALLERGY RELIEF 50 MCG/ACT nasal spray SPRAY 1 SPRAY IN EACH NOSTRIL ONCE DAILY *60 DAY SUPPLY* 1 Bottle 2   • D3 SUPER STRENGTH 2000 units Cap TAKE 2 CAPSULES ORALLY (4000 UN) ONCE DAILY 60 Cap 4   • Lancets Misc Lancets for use with glucometer, daily and if symptoms 100 Each 5   • albuterol (PROVENTIL) 2.5mg/3ml Nebu Soln solution for nebulization 3 mL by Nebulization route every four hours as needed for Shortness of Breath. 75 mL 3   • glucose blood (TRUE METRIX BLOOD GLUCOSE TEST) strip 1 Strip by Other route as needed. 50 Strip 2   • Blood Glucose Monitoring Suppl (TRUE METRIX AIR GLUCOSE METER) Device 1 Each by Does not apply route every day. 1 Device 0   • MethylPREDNISolone (MEDROL DOSEPAK) 4 MG Tablet Therapy Pack Take 1 Tab by mouth every day. (Patient not taking: Reported on 3/28/2019) 1 Kit 0   • fluticasone (FLONASE) 50 MCG/ACT nasal spray Spray 1 Spray in nose every day. (Patient not taking: Reported on 7/5/2019) 16 g 1   • Misc. Devices Misc As directed and needed  "(Patient not taking: Reported on 7/5/2019) 1 Application 0   • Misc. Devices Misc As directed (Patient not taking: Reported on 7/5/2019) 1 Application 0   • estradiol (VAGIFEM) 10 MCG Tab Insert 1 Tab in vagina every 3 days. (Patient not taking: Reported on 7/5/2019) 8 Tab 2   • TECHLITE LANCETS Misc        No current facility-administered medications for this visit.            Review Of Systems  As documented in HPI above  PHYSICAL EXAMINATION:    /82 (BP Location: Left arm, Patient Position: Sitting, BP Cuff Size: Large adult)   Pulse 78   Temp 36.2 °C (97.1 °F) (Temporal)   Resp 20   Ht 1.549 m (5' 1\")   Wt 114.3 kg (252 lb)   LMP 01/22/2010   SpO2 95%   BMI 47.61 kg/m²   Gen.: Well-developed, well-nourished, no apparent distress, pleasant and cooperative with the examination  HEENT: Normocephalic/atraumatic, sinuses nontender with palpation, TMs clear, nares patent with pink mucosa and clear rhinorrhea, oropharynx clear  Neck: No JVD or bruits, no adenopathy  Cor: Regular rate and rhythm without murmur gallop or rub  Lungs: Clear to auscultation with equal breath sounds bilaterally. No wheezes, rhonchi.  Skin: With multiple skin tags and moles in the back area  Extremities: No cyanosis, clubbing or edema          ASSESSMENT/Plan:      1. Laryngitis  ikely related to allergic reaction which possibly might be related to mold or to the chemicals she used to clean it with.  Advised to avoid all the triggers and possibly to treat the mold area in the house.  I will treat patient with Claritin Flonase and singular and encouraged lots of hydration and warm tea.    At this time and advised if she is not responding or the hoarseness of voice did not resolve patient to come back in couple of weeks for further evaluation possibly to also treat with Z-Leobardo and Medrol Dosepak and refer her to see an ENT specialist for laryngoscopy and further evaluation of hoarseness of voice.  Patient voices understanding  - " loratadine (CLARITIN REDITABS) 10 MG dissolvable tablet; Take 1 Tab by mouth every day.  Dispense: 30 Tab; Refill: 1  - montelukast (SINGULAIR) 10 MG Tab; Take 1 Tab by mouth every day.  Dispense: 30 Tab; Refill: 6  - fluticasone (FLONASE) 50 MCG/ACT nasal spray; Spray 1 Spray in nose every day.  Dispense: 16 g; Refill: 1    2. Allergic disorder, initial encounter  Advised to avoid triggers discussed with the patient today, if the symptoms did not resolve will send patient to allergy specialist and ENT for further evaluation of her hoarseness of voice.  Follow-up as directed  - loratadine (CLARITIN REDITABS) 10 MG dissolvable tablet; Take 1 Tab by mouth every day.  Dispense: 30 Tab; Refill: 1  - montelukast (SINGULAIR) 10 MG Tab; Take 1 Tab by mouth every day.  Dispense: 30 Tab; Refill: 6  - fluticasone (FLONASE) 50 MCG/ACT nasal spray; Spray 1 Spray in nose every day.  Dispense: 16 g; Refill: 1    Please note that this dictation was created using voice recognition software. I have made every reasonable attempt to correct obvious errors but there may be errors of grammar and content that I may have overlooked prior to finalization of this note.

## 2019-07-11 RX ORDER — AZITHROMYCIN 250 MG/1
TABLET, FILM COATED ORAL
Qty: 6 TAB | Refills: 0 | Status: SHIPPED | OUTPATIENT
Start: 2019-07-11

## 2019-07-11 RX ORDER — METHYLPREDNISOLONE 4 MG/1
TABLET ORAL
Qty: 1 KIT | Refills: 0 | Status: SHIPPED | OUTPATIENT
Start: 2019-07-11

## 2019-07-17 ENCOUNTER — PATIENT MESSAGE (OUTPATIENT)
Dept: MEDICAL GROUP | Facility: MEDICAL CENTER | Age: 59
End: 2019-07-17

## 2019-07-30 ENCOUNTER — OFFICE VISIT (OUTPATIENT)
Dept: MEDICAL GROUP | Facility: MEDICAL CENTER | Age: 59
End: 2019-07-30
Attending: FAMILY MEDICINE
Payer: MEDICAID

## 2019-07-30 VITALS
DIASTOLIC BLOOD PRESSURE: 79 MMHG | HEART RATE: 91 BPM | BODY MASS INDEX: 46.82 KG/M2 | SYSTOLIC BLOOD PRESSURE: 132 MMHG | WEIGHT: 248 LBS | OXYGEN SATURATION: 93 % | RESPIRATION RATE: 22 BRPM | HEIGHT: 61 IN | TEMPERATURE: 97 F

## 2019-07-30 DIAGNOSIS — R53.83 OTHER FATIGUE: ICD-10-CM

## 2019-07-30 DIAGNOSIS — R49.0 HOARSENESS OF VOICE: ICD-10-CM

## 2019-07-30 PROCEDURE — 99213 OFFICE O/P EST LOW 20 MIN: CPT | Performed by: FAMILY MEDICINE

## 2019-07-30 NOTE — PROGRESS NOTES
Chief Complaint:   Chief Complaint   Patient presents with   • Fatigue     x 6 weeks   • Referral Needed     ENT       HPI:Established patient   Natalie Eugene is a 59 y.o. female who presents for the following concerns as follows today:     Hoarseness of voice  Patient is here for follow-up on hoarseness of voice that has been going on for the past at least 6 to 8 weeks, treated with azithromycin and prednisone also treated with Claritin without resolution of symptoms, patient said she is concerned because the symptoms has not been resolving and now she is feeling very uncomfortable with talking.  Denies any difficulty on swallowing but she reports also weight loss     Other fatigue  Patient reports for the past few weeks has been also experiencing low energy fatigue tiredness and unintentional weight loss she denies loss of appetite the only concern is hoarseness of voice for 8 weeks now          Past medical history, family history, social history and medications reviewed and updated in the record. today  Current medications, problem list and allergies reviewed in [facility] today  Health maintenance topics are reviewed and updated.    Patient Active Problem List    Diagnosis Date Noted   • Acquired asplenia 04/05/2019     Priority: High   • Spherocytosis (familial) ([facility]) 08/28/2018     Priority: Low   • Trigeminal neuralgia 02/06/2019   • Hot flashes 06/05/2018   • Thrombocytosis ([facility]) 05/16/2018   • Stress incontinence of urine 11/29/2017   • Fecal occult blood test positive 10/04/2017   • Environmental allergies 03/30/2017   • Morbid obesity with BMI of 45.0-49.9, adult ([facility]) 02/16/2017   • Insomnia 07/07/2016   • Hyperlipidemia 06/09/2016   • Osteoarthritis of knee 04/14/2016   • Vision problems 03/24/2016   • Right knee pain 03/24/2016   • Vitamin D deficiency 11/12/2015   • Type 2 diabetes mellitus without complication ([facility]) 11/12/2015   • Essential hypertension 10/22/2015   • Moderate persistent asthma  10/22/2015   • Episodic headache 10/22/2015   • H/O splenectomy 10/22/2015     Family History   Problem Relation Age of Onset   • Diabetes Mother    • Heart Disease Mother    • Other Mother         parkinson   • Other Father         spherocytosis    • Other Sister         spherocytosis    • Other Brother      Social History     Social History   • Marital status: Single     Spouse name: N/A   • Number of children: N/A   • Years of education: N/A     Occupational History   •  Other     Social History Main Topics   • Smoking status: Former Smoker     Years: 5.00   • Smokeless tobacco: Never Used   • Alcohol use Not on file   • Drug use: No   • Sexual activity: Not Currently     Partners: Male     Other Topics Concern   • Not on file     Social History Narrative   • No narrative on file       Current Outpatient Medications   Medication Sig Dispense Refill   • azithromycin (ZITHROMAX) 250 MG Tab 2 tab first day , then 1 tab daily x 4 days 6 Tab 0   • methylPREDNISolone (MEDROL DOSEPAK) 4 MG Tablet Therapy Pack As per pack direction 1 Kit 0   • loratadine (CLARITIN REDITABS) 10 MG dissolvable tablet Take 1 Tab by mouth every day. 30 Tab 1   • montelukast (SINGULAIR) 10 MG Tab Take 1 Tab by mouth every day. 30 Tab 6   • fluticasone (FLONASE) 50 MCG/ACT nasal spray Spray 1 Spray in nose every day. 16 g 1   • oxybutynin SR (DITROPAN-XL) 10 MG CR tablet TAKE 1 TABLET ORALLY ONCE DAILY 30 Tab 6   • venlafaxine XR (EFFEXOR XR) 37.5 MG CAPSULE SR 24 HR TAKE 1 CAPSULE ORALLY ONCE DAILY 30 Cap 2   • hydrOXYzine HCl (ATARAX) 25 MG Tab TAKE 1 TABLET ORALLY NIGHTLY AT BEDTIME IF NEEDED FOR ANXIETY/INSOMNIA 30 Tab 2   • montelukast (SINGULAIR) 10 MG Tab TAKE 1 TABLET ORALLY ONCE DAILY 30 Tab 6   • amLODIPine (NORVASC) 10 MG Tab TAKE 1 TABLET ORALLY ONCE DAILY 30 Tab 2   • valsartan (DIOVAN) 160 MG Tab TAKE 1 TABLET ORALLY ONCE DAILY 30 Tab 2   • naproxen (NAPROSYN) 500 MG Tab TAKE 1 TABLET ORALLY 2 TIMES DAILY WITH MEALS 6 Tab 0   •  atorvastatin (LIPITOR) 40 MG Tab TAKE 1 TABLET ORALLY ONCE DAILY 30 Tab 6   • cyclobenzaprine (FLEXERIL) 10 MG Tab Take 1 Tab by mouth 1 time daily as needed. 10 Tab 0   • carbamazepine SR (TEGRETOL XR) 200 MG TABLET SR 12 HR extended-release tablet Take 2 Tabs by mouth 2 times a day. 120 Tab 4   • metformin (GLUCOPHAGE) 1000 MG tablet Take 1 Tab by mouth 2 times a day, with meals. 60 Tab 6   • SUMAtriptan (IMITREX) 25 MG Tab tablet Take 1 Tab by mouth Once PRN for Migraine for up to 1 dose. 10 Tab 0   • fluticasone (FLONASE) 50 MCG/ACT nasal spray Spray 1 Spray in nose every day. (Patient not taking: Reported on 7/5/2019) 16 g 1   • Misc. Devices Misc As directed and needed (Patient not taking: Reported on 7/5/2019) 1 Application 0   • Misc. Devices Misc As directed (Patient not taking: Reported on 7/5/2019) 1 Application 0   • tramadol (ULTRAM) 50 MG Tab Take 50 mg by mouth every four hours as needed.     • VENTOLIN  (90 Base) MCG/ACT Aero Soln inhalation aerosol INHALE 2 PUFFS ORALLY EVERY 6 HOURS IF NEEDED FOR SHORTNESS OF BREATH 1 Inhaler 2   • Spacer/Aero-Holding Chambers (BREATHERITE HAROON SPACER ADULT) Misc Spacer for use with Inhaler Generic or Brand name as substitution if covered by insurance 1 Each 1   • FLONASE ALLERGY RELIEF 50 MCG/ACT nasal spray SPRAY 1 SPRAY IN EACH NOSTRIL ONCE DAILY *60 DAY SUPPLY* 1 Bottle 2   • D3 SUPER STRENGTH 2000 units Cap TAKE 2 CAPSULES ORALLY (4000 UN) ONCE DAILY 60 Cap 4   • Lancets Misc Lancets for use with glucometer, daily and if symptoms 100 Each 5   • albuterol (PROVENTIL) 2.5mg/3ml Nebu Soln solution for nebulization 3 mL by Nebulization route every four hours as needed for Shortness of Breath. 75 mL 3   • estradiol (VAGIFEM) 10 MCG Tab Insert 1 Tab in vagina every 3 days. (Patient not taking: Reported on 7/5/2019) 8 Tab 2   • glucose blood (TRUE METRIX BLOOD GLUCOSE TEST) strip 1 Strip by Other route as needed. 50 Strip 2   • Blood Glucose Monitoring Suppl  "(TRUE METRIX AIR GLUCOSE METER) Device 1 Each by Does not apply route every day. 1 Device 0   • TECHLITE LANCETS Misc        No current facility-administered medications for this visit.          Review Of Systems  As documented in HPI above  PHYSICAL EXAMINATION:    /79 (BP Location: Left arm, Patient Position: Sitting, BP Cuff Size: Large adult)   Pulse 91   Temp 36.1 °C (97 °F) (*RETIRED* Temporal)   Resp (!) 22   Ht 1.549 m (5' 1\")   Wt 112.5 kg (248 lb)   LMP 01/22/2010   SpO2 93%   BMI 46.86 kg/m²   Gen.: Well-developed, well-nourished, no apparent distress, pleasant and cooperative with the examination  HEENT: Normocephalic/atraumatic,   Patient has no evidence of lymphadenopathy in the cervical or submandibular region, throat has been clear without evidence of exudate or erythema.      Neck: No JVD or bruits, no adenopathy  Cor: Regular rate and rhythm without murmur gallop or rub  Lungs: Clear to auscultation with equal breath sounds bilaterally. No wheezes, rhonchi.    Extremities: No cyanosis, clubbing or edema          ASSESSMENT/Plan:  1. Hoarseness of voice   patient has failed treatment with antibiotics and prednisone, persistent hoarseness of voice for the past 8 weeks will send patient to see ENT specialist for further evaluation and possible laryngoscopy to rule out mass or other causes also I will order CAT scan of the neck area of soft tissue to rule out mass or other pathology      REFERRAL TO ENT    CT-SOFT TISSUE NECK WITH & W/O   2. Other fatigue  CBC WITH DIFFERENTIAL    Comp Metabolic Panel    WESTERGREN SED RATE    TSH    HEMOGLOBIN A1C       Please note that this dictation was created using voice recognition software. I have made every reasonable attempt to correct obvious errors but there may be errors of grammar and content that I may have overlooked prior to finalization of this note.       "

## 2019-08-09 ENCOUNTER — HOSPITAL ENCOUNTER (OUTPATIENT)
Dept: LAB | Facility: MEDICAL CENTER | Age: 59
End: 2019-08-09
Attending: FAMILY MEDICINE
Payer: MEDICAID

## 2019-08-09 ENCOUNTER — TELEPHONE (OUTPATIENT)
Dept: MEDICAL GROUP | Facility: MEDICAL CENTER | Age: 59
End: 2019-08-09

## 2019-08-09 DIAGNOSIS — R53.83 OTHER FATIGUE: ICD-10-CM

## 2019-08-09 LAB
ALBUMIN SERPL BCP-MCNC: 4 G/DL (ref 3.2–4.9)
ALBUMIN/GLOB SERPL: 1.4 G/DL
ALP SERPL-CCNC: 94 U/L (ref 30–99)
ALT SERPL-CCNC: 18 U/L (ref 2–50)
ANION GAP SERPL CALC-SCNC: 8 MMOL/L (ref 0–11.9)
AST SERPL-CCNC: 19 U/L (ref 12–45)
BASOPHILS # BLD AUTO: 0.6 % (ref 0–1.8)
BASOPHILS # BLD: 0.06 K/UL (ref 0–0.12)
BILIRUB SERPL-MCNC: 0.5 MG/DL (ref 0.1–1.5)
BUN SERPL-MCNC: 11 MG/DL (ref 8–22)
CALCIUM SERPL-MCNC: 8.6 MG/DL (ref 8.5–10.5)
CHLORIDE SERPL-SCNC: 101 MMOL/L (ref 96–112)
CO2 SERPL-SCNC: 34 MMOL/L (ref 20–33)
CREAT SERPL-MCNC: 0.57 MG/DL (ref 0.5–1.4)
EOSINOPHIL # BLD AUTO: 0.26 K/UL (ref 0–0.51)
EOSINOPHIL NFR BLD: 2.8 % (ref 0–6.9)
ERYTHROCYTE [DISTWIDTH] IN BLOOD BY AUTOMATED COUNT: 42.2 FL (ref 35.9–50)
ERYTHROCYTE [SEDIMENTATION RATE] IN BLOOD BY WESTERGREN METHOD: 8 MM/HOUR (ref 0–30)
EST. AVERAGE GLUCOSE BLD GHB EST-MCNC: 143 MG/DL
GLOBULIN SER CALC-MCNC: 2.9 G/DL (ref 1.9–3.5)
GLUCOSE SERPL-MCNC: 157 MG/DL (ref 65–99)
HBA1C MFR BLD: 6.6 % (ref 0–5.6)
HCT VFR BLD AUTO: 43.9 % (ref 37–47)
HGB BLD-MCNC: 15.2 G/DL (ref 12–16)
IMM GRANULOCYTES # BLD AUTO: 0.04 K/UL (ref 0–0.11)
IMM GRANULOCYTES NFR BLD AUTO: 0.4 % (ref 0–0.9)
LYMPHOCYTES # BLD AUTO: 2.52 K/UL (ref 1–4.8)
LYMPHOCYTES NFR BLD: 26.7 % (ref 22–41)
MCH RBC QN AUTO: 31.5 PG (ref 27–33)
MCHC RBC AUTO-ENTMCNC: 34.6 G/DL (ref 33.6–35)
MCV RBC AUTO: 90.9 FL (ref 81.4–97.8)
MONOCYTES # BLD AUTO: 0.99 K/UL (ref 0–0.85)
MONOCYTES NFR BLD AUTO: 10.5 % (ref 0–13.4)
NEUTROPHILS # BLD AUTO: 5.58 K/UL (ref 2–7.15)
NEUTROPHILS NFR BLD: 59 % (ref 44–72)
NRBC # BLD AUTO: 0 K/UL
NRBC BLD-RTO: 0 /100 WBC
PLATELET # BLD AUTO: 433 K/UL (ref 164–446)
PMV BLD AUTO: 9.4 FL (ref 9–12.9)
POTASSIUM SERPL-SCNC: 2.7 MMOL/L (ref 3.6–5.5)
PROT SERPL-MCNC: 6.9 G/DL (ref 6–8.2)
RBC # BLD AUTO: 4.83 M/UL (ref 4.2–5.4)
SODIUM SERPL-SCNC: 143 MMOL/L (ref 135–145)
TSH SERPL DL<=0.005 MIU/L-ACNC: 2.66 UIU/ML (ref 0.38–5.33)
WBC # BLD AUTO: 9.5 K/UL (ref 4.8–10.8)

## 2019-08-09 PROCEDURE — 83036 HEMOGLOBIN GLYCOSYLATED A1C: CPT

## 2019-08-09 PROCEDURE — 80053 COMPREHEN METABOLIC PANEL: CPT

## 2019-08-09 PROCEDURE — 85652 RBC SED RATE AUTOMATED: CPT

## 2019-08-09 PROCEDURE — 84443 ASSAY THYROID STIM HORMONE: CPT

## 2019-08-09 PROCEDURE — 85025 COMPLETE CBC W/AUTO DIFF WBC: CPT

## 2019-08-09 PROCEDURE — 36415 COLL VENOUS BLD VENIPUNCTURE: CPT

## 2019-08-09 RX ORDER — POTASSIUM CHLORIDE 20 MEQ/1
20 TABLET, EXTENDED RELEASE ORAL 2 TIMES DAILY
Qty: 20 TAB | Refills: 1 | Status: SHIPPED | OUTPATIENT
Start: 2019-08-09 | End: 2019-10-17 | Stop reason: SDUPTHER

## 2019-08-09 NOTE — TELEPHONE ENCOUNTER
----- Message from Nico Girffin M.D. sent at 8/9/2019 12:56 PM PDT -----  Call patient please notify her that her lab work results shows evidence of low potassium level, please verify if patient is taking any water pill like furosemide/ Lasix or hydrochlorothiazide because that is a very common cause of low potassium.  I will send to pharmacy potassium supplementation patient has to take it twice a day before rechecking potassium level again in 1 week.  Thank you

## 2019-08-09 NOTE — TELEPHONE ENCOUNTER
----- Message from Nico Griffin M.D. sent at 8/9/2019 12:56 PM PDT -----  Call patient please notify her that her lab work results shows evidence of low potassium level, please verify if patient is taking any water pill like furosemide/ Lasix or hydrochlorothiazide because that is a very common cause of low potassium.  I will send to pharmacy potassium supplementation patient has to take it twice a day before rechecking potassium level again in 1 week.  Thank you

## 2019-08-19 ENCOUNTER — HOSPITAL ENCOUNTER (OUTPATIENT)
Dept: RADIOLOGY | Facility: MEDICAL CENTER | Age: 59
End: 2019-08-19
Attending: FAMILY MEDICINE
Payer: MEDICAID

## 2019-08-19 DIAGNOSIS — R49.0 HOARSENESS OF VOICE: ICD-10-CM

## 2019-08-19 PROCEDURE — 70491 CT SOFT TISSUE NECK W/DYE: CPT

## 2019-08-19 PROCEDURE — 700117 HCHG RX CONTRAST REV CODE 255: Performed by: FAMILY MEDICINE

## 2019-08-19 RX ADMIN — IOHEXOL 80 ML: 350 INJECTION, SOLUTION INTRAVENOUS at 08:45

## 2019-08-20 ENCOUNTER — TELEPHONE (OUTPATIENT)
Dept: MEDICAL GROUP | Facility: MEDICAL CENTER | Age: 59
End: 2019-08-20

## 2019-08-21 ENCOUNTER — TELEPHONE (OUTPATIENT)
Dept: MEDICAL GROUP | Facility: MEDICAL CENTER | Age: 59
End: 2019-08-21

## 2019-08-21 NOTE — TELEPHONE ENCOUNTER
----- Message from Nico Griffin M.D. sent at 8/20/2019  1:30 PM PDT -----  Please call patient reassured about the CAT scan results, without significant findings at this time.  Patient to follow-up with ENT for further assessment of hoarseness of voice

## 2019-08-21 NOTE — LETTER
August 23, 2019        Natalie Eugene  [address]  [place] [address]    Rene Estrada,     We have attempted to reach you via phone three times but have been unsuccessful. We are attempting to reach you in order to go over recent CAT Scan Results.     Please contact your primary care physician Dr. Griffin at the number listed above in order to obtain your results.       Thank you       [facility]

## 2019-08-21 NOTE — TELEPHONE ENCOUNTER
Phone Number Called: [phone] (home) [phone] (work)      Call outcome: left message for patient to call back regarding message below    Message: Attempted to call patient for results. VM not set up (1st attempt)

## 2019-08-22 NOTE — TELEPHONE ENCOUNTER
Phone Number Called: [phone] (home) [phone] (work)      Call outcome: Vociemail not set up    Message: Attempted to call patient for results. Unable to leave message (2nd attempt)

## 2019-08-23 NOTE — TELEPHONE ENCOUNTER
Phone Number Called: [phone] (home) [phone] (work)      Call outcome: Unable to leave message. VM not set up    Message: (3rd attempt) to reach patient. Will send a letter with results to home address.

## 2019-09-06 ENCOUNTER — OFFICE VISIT (OUTPATIENT)
Dept: MEDICAL GROUP | Facility: MEDICAL CENTER | Age: 59
End: 2019-09-06
Attending: FAMILY MEDICINE
Payer: MEDICAID

## 2019-09-06 VITALS
TEMPERATURE: 98.1 F | WEIGHT: 246.2 LBS | HEIGHT: 61 IN | OXYGEN SATURATION: 94 % | BODY MASS INDEX: 46.48 KG/M2 | RESPIRATION RATE: 18 BRPM | SYSTOLIC BLOOD PRESSURE: 118 MMHG | HEART RATE: 85 BPM | DIASTOLIC BLOOD PRESSURE: 70 MMHG

## 2019-09-06 DIAGNOSIS — R25.1 TREMOR: ICD-10-CM

## 2019-09-06 DIAGNOSIS — F41.9 ANXIOUSNESS: ICD-10-CM

## 2019-09-06 DIAGNOSIS — R49.0 HOARSENESS OF VOICE: ICD-10-CM

## 2019-09-06 PROCEDURE — 99214 OFFICE O/P EST MOD 30 MIN: CPT | Performed by: FAMILY MEDICINE

## 2019-09-06 PROCEDURE — 99213 OFFICE O/P EST LOW 20 MIN: CPT | Performed by: FAMILY MEDICINE

## 2019-09-06 RX ORDER — HYDROXYZINE HYDROCHLORIDE 25 MG/1
25 TABLET, FILM COATED ORAL 3 TIMES DAILY PRN
Qty: 30 TAB | Refills: 0 | Status: SHIPPED | OUTPATIENT
Start: 2019-09-06

## 2019-09-06 NOTE — PROGRESS NOTES
Chief Complaint:   Chief Complaint   Patient presents with   • Hoarse     Follow-up on hoarseness of voice   • Anxiety       HPI: Established patient  Natalie Eugene is a 59 y.o. female who presents for follow-up discuss the following concerns as follows today:    Anxiousness / Tremor  Patient reports that for the past couple of weeks has been feeling more anxious and having tremors, she said she recently had an argument with her daughter and she went very anxious with a lot of tremors in her hands and all her body and her heart was palpitating, patient said she is very concerned because of family history of Parkinson, her mother was diagnosed with Parkinson when she was in her 60s and she wants to make sure that she does not have Parkinson disease.  Patient also is very concerned because recently she has been evaluated for hoarseness of voice that is unresolved and the ENT specialist is ruling out cancer    Hoarseness of voice    Reports that the hoarseness of voice is still persistent, she has already established with ENT specialist who is doing work-up to rule out cancer.  ENT specialist is Dr. Nava        Past medical history, family history, social history and medications reviewed and updated in the record.  Today  Current medications, problem list and allergies reviewed in [facility] today  Health maintenance topics are reviewed and updated.    Patient Active Problem List    Diagnosis Date Noted   • Acquired asplenia 04/05/2019     Priority: High   • Spherocytosis (familial) ([facility]) 08/28/2018     Priority: Low   • Trigeminal neuralgia 02/06/2019   • Hot flashes 06/05/2018   • Thrombocytosis ([facility]) 05/16/2018   • Stress incontinence of urine 11/29/2017   • Fecal occult blood test positive 10/04/2017   • Environmental allergies 03/30/2017   • Morbid obesity with BMI of 45.0-49.9, adult ([facility]) 02/16/2017   • Insomnia 07/07/2016   • Hyperlipidemia 06/09/2016   • Osteoarthritis of knee 04/14/2016   • Vision problems  03/24/2016   • Right knee pain 03/24/2016   • Vitamin D deficiency 11/12/2015   • Type 2 diabetes mellitus without complication ([facility]) 11/12/2015   • Essential hypertension 10/22/2015   • Moderate persistent asthma 10/22/2015   • Episodic headache 10/22/2015   • H/O splenectomy 10/22/2015     Family History   Problem Relation Age of Onset   • Diabetes Mother    • Heart Disease Mother    • Other Mother         parkinson   • Other Father         spherocytosis    • Other Sister         spherocytosis    • Other Brother      Social History     Socioeconomic History   • Marital status: Single     Spouse name: Not on file   • Number of children: Not on file   • Years of education: Not on file   • Highest education level: Not on file   Occupational History     Employer: OTHER   Social Needs   • Financial resource strain: Not on file   • Food insecurity:     Worry: Not on file     Inability: Not on file   • Transportation needs:     Medical: Not on file     Non-medical: Not on file   Tobacco Use   • Smoking status: Former Smoker     Years: 5.00   • Smokeless tobacco: Never Used   Substance and Sexual Activity   • Alcohol use: Not on file   • Drug use: No   • Sexual activity: Not Currently     Partners: Male   Lifestyle   • Physical activity:     Days per week: Not on file     Minutes per session: Not on file   • Stress: Not on file   Relationships   • Social connections:     Talks on phone: Not on file     Gets together: Not on file     Attends Congregational service: Not on file     Active member of club or organization: Not on file     Attends meetings of clubs or organizations: Not on file     Relationship status: Not on file   • Intimate partner violence:     Fear of current or ex partner: Not on file     Emotionally abused: Not on file     Physically abused: Not on file     Forced sexual activity: Not on file   Other Topics Concern   • Not on file   Social History Narrative   • Not on file       Current Outpatient Medications    Medication Sig Dispense Refill   • hydrOXYzine HCl (ATARAX) 25 MG Tab Take 1 Tab by mouth 3 times a day as needed for Itching. 30 Tab 0   • venlafaxine XR (EFFEXOR XR) 37.5 MG CAPSULE SR 24 HR TAKE 1 CAPSULE ORALLY ONCE DAILY 30 Cap 2   • valsartan (DIOVAN) 160 MG Tab TAKE 1 TABLET ORALLY ONCE DAILY 30 Tab 2   • hydrOXYzine HCl (ATARAX) 25 MG Tab TAKE 1 TABLET ORALLY NIGHTLY AT BEDTIME IF NEEDED FOR ANXIETY/INSOMNIA 30 Tab 2   • amLODIPine (NORVASC) 10 MG Tab TAKE 1 TABLET ORALLY ONCE DAILY 30 Tab 2   • potassium chloride SA (KDUR) 20 MEQ Tab CR Take 1 Tab by mouth 2 times a day. 20 Tab 1   • azithromycin (ZITHROMAX) 250 MG Tab 2 tab first day , then 1 tab daily x 4 days 6 Tab 0   • methylPREDNISolone (MEDROL DOSEPAK) 4 MG Tablet Therapy Pack As per pack direction 1 Kit 0   • montelukast (SINGULAIR) 10 MG Tab Take 1 Tab by mouth every day. 30 Tab 6   • fluticasone (FLONASE) 50 MCG/ACT nasal spray Spray 1 Spray in nose every day. 16 g 1   • oxybutynin SR (DITROPAN-XL) 10 MG CR tablet TAKE 1 TABLET ORALLY ONCE DAILY 30 Tab 6   • montelukast (SINGULAIR) 10 MG Tab TAKE 1 TABLET ORALLY ONCE DAILY 30 Tab 6   • naproxen (NAPROSYN) 500 MG Tab TAKE 1 TABLET ORALLY 2 TIMES DAILY WITH MEALS 6 Tab 0   • atorvastatin (LIPITOR) 40 MG Tab TAKE 1 TABLET ORALLY ONCE DAILY 30 Tab 6   • cyclobenzaprine (FLEXERIL) 10 MG Tab Take 1 Tab by mouth 1 time daily as needed. 10 Tab 0   • carbamazepine SR (TEGRETOL XR) 200 MG TABLET SR 12 HR extended-release tablet Take 2 Tabs by mouth 2 times a day. 120 Tab 4   • metformin (GLUCOPHAGE) 1000 MG tablet Take 1 Tab by mouth 2 times a day, with meals. 60 Tab 6   • SUMAtriptan (IMITREX) 25 MG Tab tablet Take 1 Tab by mouth Once PRN for Migraine for up to 1 dose. 10 Tab 0   • fluticasone (FLONASE) 50 MCG/ACT nasal spray Spray 1 Spray in nose every day. (Patient not taking: Reported on 7/5/2019) 16 g 1   • Misc. Devices Misc As directed and needed (Patient not taking: Reported on 7/5/2019) 1  "Application 0   • Misc. Devices Misc As directed (Patient not taking: Reported on 7/5/2019) 1 Application 0   • tramadol (ULTRAM) 50 MG Tab Take 50 mg by mouth every four hours as needed.     • VENTOLIN  (90 Base) MCG/ACT Aero Soln inhalation aerosol INHALE 2 PUFFS ORALLY EVERY 6 HOURS IF NEEDED FOR SHORTNESS OF BREATH 1 Inhaler 2   • Spacer/Aero-Holding Chambers (BREATHERITE HAROON SPACER ADULT) Misc Spacer for use with Inhaler Generic or Brand name as substitution if covered by insurance 1 Each 1   • FLONASE ALLERGY RELIEF 50 MCG/ACT nasal spray SPRAY 1 SPRAY IN EACH NOSTRIL ONCE DAILY *60 DAY SUPPLY* 1 Bottle 2   • D3 SUPER STRENGTH 2000 units Cap TAKE 2 CAPSULES ORALLY (4000 UN) ONCE DAILY 60 Cap 4   • Lancets Misc Lancets for use with glucometer, daily and if symptoms 100 Each 5   • albuterol (PROVENTIL) 2.5mg/3ml Nebu Soln solution for nebulization 3 mL by Nebulization route every four hours as needed for Shortness of Breath. 75 mL 3   • estradiol (VAGIFEM) 10 MCG Tab Insert 1 Tab in vagina every 3 days. (Patient not taking: Reported on 7/5/2019) 8 Tab 2   • glucose blood (TRUE METRIX BLOOD GLUCOSE TEST) strip 1 Strip by Other route as needed. 50 Strip 2   • Blood Glucose Monitoring Suppl (TRUE METRIX AIR GLUCOSE METER) Device 1 Each by Does not apply route every day. 1 Device 0   • TECHLITE LANCETS Misc        No current facility-administered medications for this visit.          Review Of Systems  As documented in HPI above  PHYSICAL EXAMINATION:    /70   Pulse 85   Temp 36.7 °C (98.1 °F) (Temporal)   Resp 18   Ht 1.549 m (5' 1\")   Wt 111.7 kg (246 lb 3.2 oz)   LMP 01/22/2010   SpO2 94%   BMI 46.52 kg/m²   Gen.: Well-developed, well-nourished, no apparent distress, pleasant and cooperative with the examination  HEENT: Normocephalic/atraumatic,  Neck: No JVD or bruits, no adenopathy  Cor: Regular rate and rhythm without murmur gallop or rub  Lungs: Clear to auscultation with equal breath " sounds bilaterally. No wheezes, rhonchi.  Abdomen: Soft nontender without hepatosplenomegaly or masses appreciated, normoactive bowel sounds  Extremities: No cyanosis, clubbing or edema    Neurological exam: No neurological deficit, no cranial nerve abnormality.      ASSESSMENT/Plan:  1. Anxiousness   discussed with the patient this might be related to her anxiousness related to waiting for diagnosis of possible cancer, advised to take hydroxyzine as needed and directed reassured and advised relaxation techniques and meditation.  Follow-up as directed thyroid function test was done recently and it is within normal limits.    hydrOXYzine HCl (ATARAX) 25 MG Tab   2. Tremor   possibly in high likely related to anxiety, Parkinson disease clinically patient does not have any features of Parkinson disease normal gait, no rigidity or slow movement.  She is scheduled for an MRI of the head and neck for evaluation of the hoarseness of voice that she has been experiencing advised if there is any evidence of brain abnormality that should be recorded on the MRI that is upcoming.  The MRI that was done 6 months ago for evaluation of headaches was without significant findings    hydrOXYzine HCl (ATARAX) 25 MG Tab   3. Hoarseness of voice   follow-up with ENT specialist and do the MRI as directed for further work-up to rule out laryngeal cancer, problem is unresolved and persistent       Please note that this dictation was created using voice recognition software. I have made every reasonable attempt to correct obvious errors but there may be errors of grammar and content that I may have overlooked prior to finalization of this note.

## 2019-09-24 ENCOUNTER — HOSPITAL ENCOUNTER (OUTPATIENT)
Dept: RADIOLOGY | Facility: MEDICAL CENTER | Age: 59
End: 2019-09-24
Attending: OTOLARYNGOLOGY
Payer: MEDICAID

## 2019-09-24 DIAGNOSIS — R49.8 NEUROLOGIC VOICE DISORDER: ICD-10-CM

## 2019-09-24 DIAGNOSIS — R49.0 HOARSENESS: ICD-10-CM

## 2019-09-24 DIAGNOSIS — R22.1 NECK MASS: ICD-10-CM

## 2019-09-24 PROCEDURE — A9270 NON-COVERED ITEM OR SERVICE: HCPCS | Performed by: OTOLARYNGOLOGY

## 2019-09-24 PROCEDURE — 700112 HCHG RX REV CODE 229: Performed by: OTOLARYNGOLOGY

## 2019-09-24 PROCEDURE — 70543 MRI ORBT/FAC/NCK W/O &W/DYE: CPT

## 2019-09-24 PROCEDURE — A9576 INJ PROHANCE MULTIPACK: HCPCS | Performed by: OTOLARYNGOLOGY

## 2019-09-24 PROCEDURE — 700117 HCHG RX CONTRAST REV CODE 255: Performed by: OTOLARYNGOLOGY

## 2019-09-24 PROCEDURE — 74220 X-RAY XM ESOPHAGUS 1CNTRST: CPT

## 2019-09-24 RX ADMIN — GADOTERIDOL 20 ML: 279.3 INJECTION, SOLUTION INTRAVENOUS at 09:55

## 2019-09-24 RX ADMIN — ANTACID/ANTIFLATULENT 1 PACKET: 380; 550; 10; 10 GRANULE, EFFERVESCENT ORAL at 10:10

## 2019-10-15 ENCOUNTER — TELEPHONE (OUTPATIENT)
Dept: MEDICAL GROUP | Facility: MEDICAL CENTER | Age: 59
End: 2019-10-15

## 2019-10-16 NOTE — TELEPHONE ENCOUNTER
Voicemail not set up.  Emailed patient about no show to appointment today 10/15/19.  Explained this was her 2nd no show and the no show policy.

## 2019-10-17 DIAGNOSIS — M54.2 NECK PAIN: ICD-10-CM

## 2019-10-17 RX ORDER — POTASSIUM CHLORIDE 20 MEQ/1
TABLET, EXTENDED RELEASE ORAL
Qty: 20 TAB | Refills: 1 | Status: SHIPPED | OUTPATIENT
Start: 2019-10-17 | End: 2019-12-17

## 2019-10-17 RX ORDER — NAPROXEN 500 MG/1
TABLET ORAL
Qty: 15 TAB | Refills: 0 | Status: SHIPPED | OUTPATIENT
Start: 2019-10-17 | End: 2019-11-13 | Stop reason: SDUPTHER

## 2019-11-13 DIAGNOSIS — I10 ESSENTIAL HYPERTENSION: ICD-10-CM

## 2019-11-13 DIAGNOSIS — M54.2 NECK PAIN: ICD-10-CM

## 2019-11-13 DIAGNOSIS — E78.49 OTHER HYPERLIPIDEMIA: ICD-10-CM

## 2019-11-15 RX ORDER — ATORVASTATIN CALCIUM 40 MG/1
TABLET, FILM COATED ORAL
Qty: 30 TAB | Refills: 5 | Status: SHIPPED | OUTPATIENT
Start: 2019-11-15 | End: 2020-05-19

## 2019-11-15 RX ORDER — VALSARTAN 160 MG/1
TABLET ORAL
Qty: 30 TAB | Refills: 1 | Status: SHIPPED | OUTPATIENT
Start: 2019-11-15 | End: 2020-01-17

## 2019-11-15 RX ORDER — NAPROXEN 500 MG/1
TABLET ORAL
Qty: 15 TAB | Refills: 0 | Status: SHIPPED | OUTPATIENT
Start: 2019-11-15 | End: 2020-01-17

## 2020-03-23 ENCOUNTER — TELEPHONE (OUTPATIENT)
Dept: HEALTH INFORMATION MANAGEMENT | Facility: OTHER | Age: 60
End: 2020-03-23

## 2020-03-23 NOTE — TELEPHONE ENCOUNTER
1. Caller Name: Natalie Eugene                 Call Back Number: [phone]  [facility] PCP or Specialty Provider: Yes         2.  Does patient have any active symptoms of respiratory illness (fever OR cough OR shortness of breath OR sore throat)? No.    3.  Does patient have any comoribidities? None     4.  Has the patient traveled in the last 14 days OR had any known contact with someone who is suspected or confirmed to have COVID-19?  No.    5. Disposition: Advised to perform self care, monitor for worsening symptoms and to call back in 3 days if no improvement    Note routed to [facility] Provider: FYI only.

## 2020-05-22 DIAGNOSIS — G50.0 TRIGEMINAL NEURALGIA: ICD-10-CM

## 2020-05-22 RX ORDER — CARBAMAZEPINE 200 MG/1
400 TABLET, EXTENDED RELEASE ORAL 2 TIMES DAILY
Qty: 120 TAB | Refills: 4 | Status: SHIPPED | OUTPATIENT
Start: 2020-05-22

## 2020-08-19 ENCOUNTER — PHARMACY VISIT (OUTPATIENT)
Dept: PHARMACY | Facility: MEDICAL CENTER | Age: 60
End: 2020-08-19
Payer: COMMERCIAL

## 2020-08-19 PROCEDURE — RXMED WILLOW AMBULATORY MEDICATION CHARGE: Performed by: FAMILY MEDICINE

## 2020-09-17 DIAGNOSIS — E11.9 TYPE 2 DIABETES MELLITUS WITHOUT COMPLICATION, WITHOUT LONG-TERM CURRENT USE OF INSULIN (HCC): ICD-10-CM

## 2020-09-17 DIAGNOSIS — Z76.0 PRESCRIPTION REFILL: ICD-10-CM

## 2020-09-17 PROCEDURE — RXMED WILLOW AMBULATORY MEDICATION CHARGE: Performed by: FAMILY MEDICINE

## 2020-09-17 PROCEDURE — RXMED WILLOW AMBULATORY MEDICATION CHARGE: Performed by: OPHTHALMOLOGY

## 2020-09-17 RX ORDER — ATORVASTATIN CALCIUM 40 MG/1
TABLET, FILM COATED ORAL
Qty: 30 TAB | Refills: 5 | Status: SHIPPED | OUTPATIENT
Start: 2020-09-17 | End: 2021-09-17

## 2020-09-18 PROCEDURE — RXMED WILLOW AMBULATORY MEDICATION CHARGE: Performed by: FAMILY MEDICINE

## 2020-09-29 ENCOUNTER — PHARMACY VISIT (OUTPATIENT)
Dept: PHARMACY | Facility: MEDICAL CENTER | Age: 60
End: 2020-09-29
Payer: COMMERCIAL

## 2020-12-09 DIAGNOSIS — T78.40XA ALLERGIC DISORDER, INITIAL ENCOUNTER: ICD-10-CM

## 2020-12-09 DIAGNOSIS — J04.0 LARYNGITIS: ICD-10-CM

## 2020-12-09 PROCEDURE — RXMED WILLOW AMBULATORY MEDICATION CHARGE: Performed by: FAMILY MEDICINE

## 2020-12-09 RX ORDER — MONTELUKAST SODIUM 10 MG/1
10 TABLET ORAL DAILY
Qty: 30 TAB | Refills: 5 | Status: SHIPPED | OUTPATIENT
Start: 2020-12-09 | End: 2022-07-20 | Stop reason: SDUPTHER

## 2020-12-10 ENCOUNTER — PHARMACY VISIT (OUTPATIENT)
Dept: PHARMACY | Facility: MEDICAL CENTER | Age: 60
End: 2020-12-10
Payer: COMMERCIAL

## 2020-12-29 PROCEDURE — RXMED WILLOW AMBULATORY MEDICATION CHARGE: Performed by: FAMILY MEDICINE

## 2021-01-14 ENCOUNTER — PHARMACY VISIT (OUTPATIENT)
Dept: PHARMACY | Facility: MEDICAL CENTER | Age: 61
End: 2021-01-14
Payer: COMMERCIAL

## 2021-01-22 ENCOUNTER — PHARMACY VISIT (OUTPATIENT)
Dept: PHARMACY | Facility: MEDICAL CENTER | Age: 61
End: 2021-01-22
Payer: COMMERCIAL

## 2021-01-22 DIAGNOSIS — I10 ESSENTIAL HYPERTENSION: ICD-10-CM

## 2021-01-22 PROCEDURE — RXMED WILLOW AMBULATORY MEDICATION CHARGE: Performed by: FAMILY MEDICINE

## 2021-01-25 RX ORDER — VALSARTAN 160 MG/1
TABLET ORAL
Qty: 30 TAB | Refills: 0 | Status: SHIPPED | OUTPATIENT
Start: 2021-01-25 | End: 2021-06-24 | Stop reason: SDUPTHER

## 2021-03-11 PROCEDURE — RXMED WILLOW AMBULATORY MEDICATION CHARGE: Performed by: FAMILY MEDICINE

## 2021-03-15 DIAGNOSIS — Z23 NEED FOR VACCINATION: ICD-10-CM

## 2021-03-17 ENCOUNTER — PHARMACY VISIT (OUTPATIENT)
Dept: PHARMACY | Facility: MEDICAL CENTER | Age: 61
End: 2021-03-17
Payer: COMMERCIAL

## 2021-03-29 PROCEDURE — RXMED WILLOW AMBULATORY MEDICATION CHARGE: Performed by: FAMILY MEDICINE

## 2021-04-01 DIAGNOSIS — R23.2 HOT FLASHES: ICD-10-CM

## 2021-04-01 RX ORDER — VENLAFAXINE HYDROCHLORIDE 37.5 MG/1
37.5 CAPSULE, EXTENDED RELEASE ORAL DAILY
Qty: 30 CAPSULE | Refills: 5 | OUTPATIENT
Start: 2021-04-01

## 2021-04-05 ENCOUNTER — TELEPHONE (OUTPATIENT)
Dept: MEDICAL GROUP | Facility: MEDICAL CENTER | Age: 61
End: 2021-04-05

## 2021-04-05 ENCOUNTER — PHARMACY VISIT (OUTPATIENT)
Dept: PHARMACY | Facility: MEDICAL CENTER | Age: 61
End: 2021-04-05
Payer: COMMERCIAL

## 2021-04-05 DIAGNOSIS — Z76.0 PRESCRIPTION REFILL: ICD-10-CM

## 2021-04-05 PROCEDURE — RXMED WILLOW AMBULATORY MEDICATION CHARGE: Performed by: FAMILY MEDICINE

## 2021-04-05 RX ORDER — AMLODIPINE BESYLATE 10 MG/1
TABLET ORAL
Qty: 30 TABLET | Refills: 1 | Status: SHIPPED | OUTPATIENT
Start: 2021-04-05 | End: 2021-06-24 | Stop reason: SDUPTHER

## 2021-04-05 NOTE — TELEPHONE ENCOUNTER
Please let Natalie know that her prescription has been refilled for 1 month with 1 refill.  This is to allow her time to schedule a get established visit with a new provider since Dr. Griffin no longer works at the [facility]

## 2021-04-05 NOTE — TELEPHONE ENCOUNTER
Phone Number Called: [phone] (home)       Call outcome: Left detailed message for patient. Informed to call back with any additional questions.    Message: left message/ mychart message     Please let Natalie know that her prescription has been refilled for 1 month with 1 refill.  This is to allow her time to schedule a get established visit with a new provider since Dr. Griffin no longer works at the [facility]

## 2021-04-09 ENCOUNTER — PHARMACY VISIT (OUTPATIENT)
Dept: PHARMACY | Facility: MEDICAL CENTER | Age: 61
End: 2021-04-09
Payer: COMMERCIAL

## 2021-04-29 DIAGNOSIS — R23.2 HOT FLASHES: ICD-10-CM

## 2021-04-29 DIAGNOSIS — M17.11 PRIMARY OSTEOARTHRITIS OF RIGHT KNEE: ICD-10-CM

## 2021-04-30 RX ORDER — NAPROXEN 500 MG/1
TABLET ORAL
Qty: 15 TABLET | Refills: 0 | Status: SHIPPED | OUTPATIENT
Start: 2021-04-30

## 2021-04-30 RX ORDER — HYDROXYZINE HYDROCHLORIDE 25 MG/1
TABLET, FILM COATED ORAL
Qty: 30 TABLET | Refills: 3 | Status: SHIPPED | OUTPATIENT
Start: 2021-04-30 | End: 2022-06-02

## 2021-04-30 RX ORDER — OXYBUTYNIN CHLORIDE 10 MG/1
TABLET, EXTENDED RELEASE ORAL
Qty: 30 TABLET | Refills: 6 | Status: SHIPPED | OUTPATIENT
Start: 2021-04-30 | End: 2022-04-30

## 2021-04-30 RX ORDER — VENLAFAXINE HYDROCHLORIDE 37.5 MG/1
37.5 CAPSULE, EXTENDED RELEASE ORAL DAILY
Qty: 30 CAPSULE | Refills: 5 | Status: SHIPPED | OUTPATIENT
Start: 2021-04-30

## 2021-04-30 NOTE — TELEPHONE ENCOUNTER
Received request via: Pharmacy    Was the patient seen in the last year in this department? No   LOV 09/06/2019  Does the patient have an active prescription (recently filled or refills available) for medication(s) requested? No

## 2021-05-19 PROCEDURE — RXMED WILLOW AMBULATORY MEDICATION CHARGE: Performed by: FAMILY MEDICINE

## 2021-05-20 ENCOUNTER — PHARMACY VISIT (OUTPATIENT)
Dept: PHARMACY | Facility: MEDICAL CENTER | Age: 61
End: 2021-05-20
Payer: COMMERCIAL

## 2021-05-20 PROCEDURE — RXMED WILLOW AMBULATORY MEDICATION CHARGE: Performed by: OPHTHALMOLOGY

## 2021-06-14 DIAGNOSIS — I10 ESSENTIAL HYPERTENSION: ICD-10-CM

## 2021-06-14 DIAGNOSIS — Z76.0 PRESCRIPTION REFILL: ICD-10-CM

## 2021-06-14 RX ORDER — VALSARTAN 160 MG/1
TABLET ORAL
Qty: 30 TABLET | Refills: 0 | OUTPATIENT
Start: 2021-06-14 | End: 2022-06-11

## 2021-06-14 RX ORDER — AMLODIPINE BESYLATE 10 MG/1
TABLET ORAL
Qty: 30 TABLET | Refills: 1 | OUTPATIENT
Start: 2021-06-14

## 2021-06-24 DIAGNOSIS — Z76.0 PRESCRIPTION REFILL: ICD-10-CM

## 2021-06-24 DIAGNOSIS — J45.901 MILD ASTHMA WITH EXACERBATION, UNSPECIFIED WHETHER PERSISTENT: ICD-10-CM

## 2021-06-24 DIAGNOSIS — I10 ESSENTIAL HYPERTENSION: ICD-10-CM

## 2021-06-24 RX ORDER — IPRATROPIUM BROMIDE 17 UG/1
AEROSOL, METERED RESPIRATORY (INHALATION)
Qty: 12.9 G | Refills: 6 | Status: SHIPPED | OUTPATIENT
Start: 2021-06-24 | End: 2022-09-30 | Stop reason: SDUPTHER

## 2021-06-24 RX ORDER — AMLODIPINE BESYLATE 10 MG/1
TABLET ORAL
Qty: 90 TABLET | Refills: 3 | Status: SHIPPED | OUTPATIENT
Start: 2021-06-24 | End: 2024-03-01 | Stop reason: SDUPTHER

## 2021-06-24 RX ORDER — VALSARTAN 160 MG/1
TABLET ORAL
Qty: 90 TABLET | Refills: 3 | Status: SHIPPED | OUTPATIENT
Start: 2021-06-24 | End: 2022-07-02

## 2021-08-23 PROCEDURE — RXMED WILLOW AMBULATORY MEDICATION CHARGE: Performed by: FAMILY MEDICINE

## 2021-08-25 ENCOUNTER — PHARMACY VISIT (OUTPATIENT)
Dept: PHARMACY | Facility: MEDICAL CENTER | Age: 61
End: 2021-08-25
Payer: COMMERCIAL

## 2021-09-20 PROCEDURE — RXMED WILLOW AMBULATORY MEDICATION CHARGE: Performed by: FAMILY MEDICINE

## 2021-09-24 ENCOUNTER — PHARMACY VISIT (OUTPATIENT)
Dept: PHARMACY | Facility: MEDICAL CENTER | Age: 61
End: 2021-09-24
Payer: COMMERCIAL

## 2021-10-13 DIAGNOSIS — I10 ESSENTIAL HYPERTENSION: ICD-10-CM

## 2021-10-13 NOTE — TELEPHONE ENCOUNTER
Received request via: Pharmacy    Was the patient seen in the last year in this department? No     Does the patient have an active prescription (recently filled or refills available) for medication(s) requested? No     Killian is requesting a 60 day supply of medication. Pharmacy on fax: [facility] home delivery pharmacy

## 2021-10-14 RX ORDER — VALSARTAN 160 MG/1
TABLET ORAL
Qty: 90 TABLET | Refills: 3 | Status: SHIPPED | OUTPATIENT
Start: 2021-10-14 | End: 2024-03-01 | Stop reason: SDUPTHER

## 2021-11-05 DIAGNOSIS — E11.9 TYPE 2 DIABETES MELLITUS WITHOUT COMPLICATION, WITHOUT LONG-TERM CURRENT USE OF INSULIN (HCC): ICD-10-CM

## 2021-11-05 PROCEDURE — RXMED WILLOW AMBULATORY MEDICATION CHARGE: Performed by: FAMILY MEDICINE

## 2021-11-10 DIAGNOSIS — E11.9 TYPE 2 DIABETES MELLITUS WITHOUT COMPLICATION, WITHOUT LONG-TERM CURRENT USE OF INSULIN (HCC): ICD-10-CM

## 2021-11-17 ENCOUNTER — PHARMACY VISIT (OUTPATIENT)
Dept: PHARMACY | Facility: MEDICAL CENTER | Age: 61
End: 2021-11-17
Payer: COMMERCIAL

## 2021-12-15 DIAGNOSIS — J04.0 LARYNGITIS: ICD-10-CM

## 2021-12-15 DIAGNOSIS — E11.9 TYPE 2 DIABETES MELLITUS WITHOUT COMPLICATION, WITHOUT LONG-TERM CURRENT USE OF INSULIN (HCC): ICD-10-CM

## 2021-12-15 DIAGNOSIS — T78.40XA ALLERGIC DISORDER, INITIAL ENCOUNTER: ICD-10-CM

## 2021-12-15 RX ORDER — MONTELUKAST SODIUM 10 MG/1
10 TABLET ORAL DAILY
Qty: 30 TABLET | Refills: 5 | OUTPATIENT
Start: 2021-12-15

## 2021-12-30 DIAGNOSIS — T78.40XA ALLERGIC DISORDER, INITIAL ENCOUNTER: ICD-10-CM

## 2021-12-30 DIAGNOSIS — J04.0 LARYNGITIS: ICD-10-CM

## 2021-12-30 PROCEDURE — RXMED WILLOW AMBULATORY MEDICATION CHARGE: Performed by: FAMILY MEDICINE

## 2021-12-30 PROCEDURE — RXMED WILLOW AMBULATORY MEDICATION CHARGE: Performed by: NURSE PRACTITIONER

## 2021-12-30 RX ORDER — MONTELUKAST SODIUM 10 MG/1
10 TABLET ORAL DAILY
Qty: 30 TABLET | Refills: 5 | OUTPATIENT
Start: 2021-12-30

## 2021-12-31 ENCOUNTER — PHARMACY VISIT (OUTPATIENT)
Dept: PHARMACY | Facility: MEDICAL CENTER | Age: 61
End: 2021-12-31
Payer: COMMERCIAL

## 2022-02-08 DIAGNOSIS — J04.0 LARYNGITIS: ICD-10-CM

## 2022-02-08 DIAGNOSIS — T78.40XA ALLERGIC DISORDER, INITIAL ENCOUNTER: ICD-10-CM

## 2022-02-08 PROCEDURE — RXMED WILLOW AMBULATORY MEDICATION CHARGE: Performed by: NURSE PRACTITIONER

## 2022-02-08 PROCEDURE — RXMED WILLOW AMBULATORY MEDICATION CHARGE: Performed by: FAMILY MEDICINE

## 2022-02-08 RX ORDER — MONTELUKAST SODIUM 10 MG/1
10 TABLET ORAL DAILY
Qty: 30 TABLET | Refills: 5 | OUTPATIENT
Start: 2022-02-08

## 2022-02-08 NOTE — TELEPHONE ENCOUNTER
Patient has not been seen since 2019.   No further medications until patient establishes with new provider.

## 2022-02-15 DIAGNOSIS — T78.40XA ALLERGIC DISORDER, INITIAL ENCOUNTER: ICD-10-CM

## 2022-02-15 DIAGNOSIS — J04.0 LARYNGITIS: ICD-10-CM

## 2022-02-15 RX ORDER — MONTELUKAST SODIUM 10 MG/1
10 TABLET ORAL DAILY
Qty: 30 TABLET | Refills: 5 | OUTPATIENT
Start: 2022-02-15

## 2022-02-17 ENCOUNTER — PHARMACY VISIT (OUTPATIENT)
Dept: PHARMACY | Facility: MEDICAL CENTER | Age: 62
End: 2022-02-17
Payer: MEDICARE

## 2022-03-21 DIAGNOSIS — J04.0 LARYNGITIS: ICD-10-CM

## 2022-03-21 DIAGNOSIS — T78.40XA ALLERGIC DISORDER, INITIAL ENCOUNTER: ICD-10-CM

## 2022-03-21 PROCEDURE — RXMED WILLOW AMBULATORY MEDICATION CHARGE: Performed by: NURSE PRACTITIONER

## 2022-03-21 PROCEDURE — RXMED WILLOW AMBULATORY MEDICATION CHARGE: Performed by: FAMILY MEDICINE

## 2022-03-21 RX ORDER — MONTELUKAST SODIUM 10 MG/1
10 TABLET ORAL DAILY
Qty: 30 TABLET | Refills: 5 | OUTPATIENT
Start: 2022-03-21

## 2022-03-29 ENCOUNTER — PHARMACY VISIT (OUTPATIENT)
Dept: PHARMACY | Facility: MEDICAL CENTER | Age: 62
End: 2022-03-29
Payer: MEDICARE

## 2022-04-20 PROCEDURE — RXMED WILLOW AMBULATORY MEDICATION CHARGE: Performed by: FAMILY MEDICINE

## 2022-04-20 PROCEDURE — RXMED WILLOW AMBULATORY MEDICATION CHARGE: Performed by: NURSE PRACTITIONER

## 2022-05-03 ENCOUNTER — PHARMACY VISIT (OUTPATIENT)
Dept: PHARMACY | Facility: MEDICAL CENTER | Age: 62
End: 2022-05-03
Payer: MEDICARE

## 2022-05-20 PROCEDURE — RXMED WILLOW AMBULATORY MEDICATION CHARGE: Performed by: NURSE PRACTITIONER

## 2022-05-20 PROCEDURE — RXMED WILLOW AMBULATORY MEDICATION CHARGE: Performed by: FAMILY MEDICINE

## 2022-05-24 ENCOUNTER — PHARMACY VISIT (OUTPATIENT)
Dept: PHARMACY | Facility: MEDICAL CENTER | Age: 62
End: 2022-05-24
Payer: MEDICARE

## 2022-07-05 DIAGNOSIS — T78.40XA ALLERGIC DISORDER, INITIAL ENCOUNTER: ICD-10-CM

## 2022-07-05 DIAGNOSIS — J04.0 LARYNGITIS: ICD-10-CM

## 2022-07-05 DIAGNOSIS — Z76.0 PRESCRIPTION REFILL: ICD-10-CM

## 2022-07-05 DIAGNOSIS — I10 ESSENTIAL HYPERTENSION: ICD-10-CM

## 2022-07-05 PROCEDURE — RXMED WILLOW AMBULATORY MEDICATION CHARGE: Performed by: FAMILY MEDICINE

## 2022-07-05 PROCEDURE — RXMED WILLOW AMBULATORY MEDICATION CHARGE: Performed by: NURSE PRACTITIONER

## 2022-07-05 RX ORDER — AMLODIPINE BESYLATE 10 MG/1
TABLET ORAL
Qty: 90 TABLET | Refills: 3 | OUTPATIENT
Start: 2022-07-05

## 2022-07-05 RX ORDER — MONTELUKAST SODIUM 10 MG/1
10 TABLET ORAL DAILY
Qty: 30 TABLET | Refills: 5 | OUTPATIENT
Start: 2022-07-05

## 2022-07-05 RX ORDER — VALSARTAN 160 MG/1
TABLET ORAL
Qty: 90 TABLET | Refills: 3 | OUTPATIENT
Start: 2022-07-05 | End: 2023-07-02

## 2022-07-05 RX ORDER — HYDROXYZINE HYDROCHLORIDE 25 MG/1
25 TABLET, FILM COATED ORAL
Qty: 30 TABLET | Refills: 2 | OUTPATIENT
Start: 2022-07-05

## 2022-07-05 NOTE — TELEPHONE ENCOUNTER
Received request via: Pharmacy    Was the patient seen in the last year in this department? No, not seen since 2019    Does the patient have an active prescription (recently filled or refills available) for medication(s) requested? No

## 2022-07-06 DIAGNOSIS — T78.40XA ALLERGIC DISORDER, INITIAL ENCOUNTER: ICD-10-CM

## 2022-07-06 DIAGNOSIS — Z76.0 PRESCRIPTION REFILL: ICD-10-CM

## 2022-07-06 DIAGNOSIS — J04.0 LARYNGITIS: ICD-10-CM

## 2022-07-06 DIAGNOSIS — M17.11 PRIMARY OSTEOARTHRITIS OF RIGHT KNEE: ICD-10-CM

## 2022-07-07 RX ORDER — AMLODIPINE BESYLATE 10 MG/1
TABLET ORAL
Qty: 90 TABLET | Refills: 3 | OUTPATIENT
Start: 2022-07-07

## 2022-07-07 RX ORDER — NAPROXEN 500 MG/1
TABLET ORAL
Qty: 15 TABLET | Refills: 0 | OUTPATIENT
Start: 2022-07-07

## 2022-07-07 RX ORDER — MONTELUKAST SODIUM 10 MG/1
10 TABLET ORAL DAILY
Qty: 30 TABLET | Refills: 5 | OUTPATIENT
Start: 2022-07-07

## 2022-07-08 NOTE — ASSESSMENT & PLAN NOTE
BP normotensive in clinic today. Known hx of HTN. Pt stating that they have been taking their medication as prescribed without adverse effect. Pt denies HA, vision changes, neurologic deficits, CP, SOB.    LC in to see this P2 patient. She states breastfeeding and pumping has been painful. She states that this happened with her first. LC assisted with this feeding and noted that she expressed that the pain was almost too painful to breastfeed. LC provided different flanges to her pump to see if a different style and minimal suction would help her with tolerating and still inducing lactation. LC noted that infant's latch had no issues and mom positioned infant well. LC discussed with patient about  normal  breastfeeding behaviors and breastfeeding expectations for the next 2 days and to call as needed for lactation assistance . Patient showed good understanding.

## 2022-07-18 DIAGNOSIS — M17.11 PRIMARY OSTEOARTHRITIS OF RIGHT KNEE: ICD-10-CM

## 2022-07-18 DIAGNOSIS — J04.0 LARYNGITIS: ICD-10-CM

## 2022-07-18 DIAGNOSIS — T78.40XA ALLERGIC DISORDER, INITIAL ENCOUNTER: ICD-10-CM

## 2022-07-18 DIAGNOSIS — Z76.0 PRESCRIPTION REFILL: ICD-10-CM

## 2022-07-18 RX ORDER — NAPROXEN 500 MG/1
TABLET ORAL
Qty: 15 TABLET | Refills: 0 | OUTPATIENT
Start: 2022-07-18

## 2022-07-18 RX ORDER — MONTELUKAST SODIUM 10 MG/1
10 TABLET ORAL DAILY
Qty: 30 TABLET | Refills: 5 | OUTPATIENT
Start: 2022-07-18

## 2022-07-18 RX ORDER — HYDROXYZINE HYDROCHLORIDE 25 MG/1
25 TABLET, FILM COATED ORAL
Qty: 30 TABLET | Refills: 2 | OUTPATIENT
Start: 2022-07-18

## 2022-07-20 ENCOUNTER — PHARMACY VISIT (OUTPATIENT)
Dept: PHARMACY | Facility: MEDICAL CENTER | Age: 62
End: 2022-07-20
Payer: MEDICARE

## 2022-07-20 DIAGNOSIS — I10 ESSENTIAL HYPERTENSION: ICD-10-CM

## 2022-07-20 DIAGNOSIS — T78.40XA ALLERGIC DISORDER, INITIAL ENCOUNTER: ICD-10-CM

## 2022-07-20 DIAGNOSIS — J04.0 LARYNGITIS: ICD-10-CM

## 2022-07-20 DIAGNOSIS — Z76.0 PRESCRIPTION REFILL: ICD-10-CM

## 2022-07-20 RX ORDER — VALSARTAN 160 MG/1
TABLET ORAL
Qty: 90 TABLET | Refills: 3 | OUTPATIENT
Start: 2022-07-20 | End: 2023-07-17

## 2022-07-20 RX ORDER — HYDROXYZINE HYDROCHLORIDE 25 MG/1
25 TABLET, FILM COATED ORAL
Qty: 30 TABLET | Refills: 2 | OUTPATIENT
Start: 2022-07-20

## 2022-07-20 RX ORDER — MONTELUKAST SODIUM 10 MG/1
10 TABLET ORAL DAILY
Qty: 30 TABLET | Refills: 5 | OUTPATIENT
Start: 2022-07-20

## 2022-07-20 RX ORDER — AMLODIPINE BESYLATE 10 MG/1
TABLET ORAL
Qty: 90 TABLET | Refills: 3 | OUTPATIENT
Start: 2022-07-20

## 2022-07-21 PROCEDURE — RXMED WILLOW AMBULATORY MEDICATION CHARGE: Performed by: FAMILY MEDICINE

## 2022-07-21 RX ORDER — MONTELUKAST SODIUM 10 MG/1
10 TABLET ORAL DAILY
Qty: 30 TABLET | Refills: 5 | Status: SHIPPED | OUTPATIENT
Start: 2022-07-21

## 2022-07-22 DIAGNOSIS — Z76.0 PRESCRIPTION REFILL: ICD-10-CM

## 2022-07-22 RX ORDER — HYDROXYZINE HYDROCHLORIDE 25 MG/1
25 TABLET, FILM COATED ORAL
Qty: 30 TABLET | Refills: 2 | OUTPATIENT
Start: 2022-07-22

## 2022-07-22 RX ORDER — AMLODIPINE BESYLATE 10 MG/1
TABLET ORAL
Qty: 90 TABLET | Refills: 3 | OUTPATIENT
Start: 2022-07-22

## 2022-07-29 DIAGNOSIS — Z76.0 PRESCRIPTION REFILL: ICD-10-CM

## 2022-07-29 RX ORDER — HYDROXYZINE HYDROCHLORIDE 25 MG/1
25 TABLET, FILM COATED ORAL
Qty: 30 TABLET | Refills: 2 | OUTPATIENT
Start: 2022-07-29

## 2022-07-29 RX ORDER — AMLODIPINE BESYLATE 10 MG/1
TABLET ORAL
Qty: 90 TABLET | Refills: 3 | OUTPATIENT
Start: 2022-07-29

## 2022-08-02 ENCOUNTER — PHARMACY VISIT (OUTPATIENT)
Dept: PHARMACY | Facility: MEDICAL CENTER | Age: 62
End: 2022-08-02
Payer: MEDICARE

## 2022-08-03 DIAGNOSIS — Z76.0 PRESCRIPTION REFILL: ICD-10-CM

## 2022-08-03 DIAGNOSIS — J45.901 MILD ASTHMA WITH EXACERBATION, UNSPECIFIED WHETHER PERSISTENT: ICD-10-CM

## 2022-08-03 RX ORDER — AMLODIPINE BESYLATE 10 MG/1
TABLET ORAL
Qty: 90 TABLET | Refills: 3 | OUTPATIENT
Start: 2022-08-03

## 2022-08-03 RX ORDER — IPRATROPIUM BROMIDE 17 UG/1
AEROSOL, METERED RESPIRATORY (INHALATION)
Qty: 12.9 G | Refills: 6 | OUTPATIENT
Start: 2022-08-03

## 2022-08-08 PROCEDURE — RXMED WILLOW AMBULATORY MEDICATION CHARGE: Performed by: FAMILY MEDICINE

## 2022-08-15 DIAGNOSIS — Z76.0 PRESCRIPTION REFILL: ICD-10-CM

## 2022-08-15 RX ORDER — AMLODIPINE BESYLATE 10 MG/1
TABLET ORAL
Qty: 90 TABLET | Refills: 3 | Status: CANCELLED | OUTPATIENT
Start: 2022-08-03

## 2022-08-16 DIAGNOSIS — Z76.0 PRESCRIPTION REFILL: ICD-10-CM

## 2022-08-16 RX ORDER — AMLODIPINE BESYLATE 10 MG/1
TABLET ORAL
Qty: 90 TABLET | Refills: 3 | OUTPATIENT
Start: 2022-08-16

## 2022-08-17 DIAGNOSIS — Z76.0 PRESCRIPTION REFILL: ICD-10-CM

## 2022-08-17 RX ORDER — AMLODIPINE BESYLATE 10 MG/1
TABLET ORAL
Qty: 90 TABLET | Refills: 3 | OUTPATIENT
Start: 2022-08-17

## 2022-08-18 ENCOUNTER — PHARMACY VISIT (OUTPATIENT)
Dept: PHARMACY | Facility: MEDICAL CENTER | Age: 62
End: 2022-08-18
Payer: MEDICARE

## 2022-08-19 RX ORDER — AMLODIPINE BESYLATE 10 MG/1
TABLET ORAL
Qty: 90 TABLET | Refills: 3 | OUTPATIENT
Start: 2022-08-19

## 2022-08-22 DIAGNOSIS — Z76.0 PRESCRIPTION REFILL: ICD-10-CM

## 2022-08-22 RX ORDER — AMLODIPINE BESYLATE 10 MG/1
TABLET ORAL
Qty: 90 TABLET | Refills: 3 | OUTPATIENT
Start: 2022-08-22

## 2022-08-23 ENCOUNTER — TELEPHONE (OUTPATIENT)
Dept: MEDICAL GROUP | Facility: MEDICAL CENTER | Age: 62
End: 2022-08-23
Payer: COMMERCIAL

## 2022-09-30 DIAGNOSIS — J45.901 MILD ASTHMA WITH EXACERBATION, UNSPECIFIED WHETHER PERSISTENT: ICD-10-CM

## 2022-09-30 DIAGNOSIS — E78.49 OTHER HYPERLIPIDEMIA: ICD-10-CM

## 2022-09-30 RX ORDER — IPRATROPIUM BROMIDE 17 UG/1
2 AEROSOL, METERED RESPIRATORY (INHALATION) EVERY 6 HOURS
Qty: 12.9 G | Refills: 0 | Status: SHIPPED | OUTPATIENT
Start: 2022-09-30

## 2022-09-30 RX ORDER — ATORVASTATIN CALCIUM 40 MG/1
40 TABLET, FILM COATED ORAL DAILY
Qty: 30 TABLET | Refills: 0 | Status: SHIPPED | OUTPATIENT
Start: 2022-09-30

## 2022-10-17 DIAGNOSIS — Z76.0 PRESCRIPTION REFILL: ICD-10-CM

## 2022-10-17 RX ORDER — AMLODIPINE BESYLATE 10 MG/1
TABLET ORAL
Qty: 90 TABLET | Refills: 3 | OUTPATIENT
Start: 2022-10-17

## 2022-10-17 RX ORDER — HYDROXYZINE HYDROCHLORIDE 25 MG/1
TABLET, FILM COATED ORAL
Qty: 30 TABLET | Refills: 3 | OUTPATIENT
Start: 2022-10-17 | End: 2023-10-16

## 2022-11-02 DIAGNOSIS — I10 ESSENTIAL HYPERTENSION: ICD-10-CM

## 2022-11-02 DIAGNOSIS — Z76.0 PRESCRIPTION REFILL: ICD-10-CM

## 2022-11-02 RX ORDER — VALSARTAN 160 MG/1
TABLET ORAL
Qty: 90 TABLET | Refills: 3 | Status: CANCELLED | OUTPATIENT
Start: 2022-11-02

## 2022-11-08 RX ORDER — HYDROXYZINE HYDROCHLORIDE 25 MG/1
TABLET, FILM COATED ORAL
Qty: 30 TABLET | Refills: 3 | OUTPATIENT
Start: 2022-11-08 | End: 2023-11-01

## 2022-11-08 RX ORDER — VALSARTAN 160 MG/1
TABLET ORAL
Qty: 90 TABLET | Refills: 3 | OUTPATIENT
Start: 2022-11-08

## 2022-11-08 RX ORDER — AMLODIPINE BESYLATE 10 MG/1
TABLET ORAL
Qty: 90 TABLET | Refills: 3 | OUTPATIENT
Start: 2022-11-08

## 2024-03-01 ENCOUNTER — OFFICE VISIT (OUTPATIENT)
Dept: URGENT CARE | Facility: CLINIC | Age: 64
End: 2024-03-01
Payer: COMMERCIAL

## 2024-03-01 VITALS
RESPIRATION RATE: 20 BRPM | OXYGEN SATURATION: 96 % | TEMPERATURE: 98 F | DIASTOLIC BLOOD PRESSURE: 88 MMHG | WEIGHT: 240 LBS | BODY MASS INDEX: 45.31 KG/M2 | SYSTOLIC BLOOD PRESSURE: 138 MMHG | HEIGHT: 61 IN | HEART RATE: 74 BPM

## 2024-03-01 DIAGNOSIS — I10 ESSENTIAL HYPERTENSION: ICD-10-CM

## 2024-03-01 DIAGNOSIS — E11.9 TYPE 2 DIABETES MELLITUS WITHOUT COMPLICATION, WITHOUT LONG-TERM CURRENT USE OF INSULIN (HCC): ICD-10-CM

## 2024-03-01 PROCEDURE — 3075F SYST BP GE 130 - 139MM HG: CPT | Performed by: REGISTERED NURSE

## 2024-03-01 PROCEDURE — 3079F DIAST BP 80-89 MM HG: CPT | Performed by: REGISTERED NURSE

## 2024-03-01 PROCEDURE — 99203 OFFICE O/P NEW LOW 30 MIN: CPT | Performed by: REGISTERED NURSE

## 2024-03-01 RX ORDER — VALSARTAN 160 MG/1
160 TABLET ORAL DAILY
COMMUNITY
End: 2024-03-01

## 2024-03-01 RX ORDER — VALSARTAN 160 MG/1
TABLET ORAL
Qty: 90 TABLET | Refills: 3 | Status: SHIPPED | OUTPATIENT
Start: 2024-03-01 | End: 2024-03-01

## 2024-03-01 RX ORDER — VALSARTAN 160 MG/1
TABLET ORAL
Qty: 90 TABLET | Refills: 3 | Status: SHIPPED
Start: 2024-03-01 | End: 2024-03-01

## 2024-03-01 RX ORDER — VALSARTAN 160 MG/1
160 TABLET ORAL DAILY
Qty: 30 TABLET | Refills: 2 | Status: SHIPPED | OUTPATIENT
Start: 2024-03-01

## 2024-03-01 RX ORDER — VALSARTAN 160 MG/1
TABLET ORAL
Qty: 90 TABLET | Refills: 3 | Status: SHIPPED
Start: 2024-03-01

## 2024-03-01 RX ORDER — AMLODIPINE BESYLATE 10 MG/1
TABLET ORAL
Qty: 90 TABLET | Refills: 3 | Status: SHIPPED | OUTPATIENT
Start: 2024-03-01

## 2024-03-01 RX ORDER — AMLODIPINE BESYLATE 10 MG/1
TABLET ORAL
Qty: 90 TABLET | Refills: 3 | Status: SHIPPED | OUTPATIENT
Start: 2024-03-01 | End: 2024-03-01

## 2024-03-01 ASSESSMENT — ENCOUNTER SYMPTOMS
WEIGHT LOSS: 0
POLYDIPSIA: 0
CHILLS: 0
ABDOMINAL PAIN: 0
FEVER: 0
SHORTNESS OF BREATH: 0

## 2024-03-01 NOTE — PROGRESS NOTES
"Subjective:   Gisela Eugene is a 64 y.o. female who presents for Hypertension and Diabetes      HPI  Hx of HTN, Valsartan 160mg qday, Amlodipine 10mg daily.  Does not check at home.  No lightheadedness, chest pain or shortness of breath.  Needs refills    Hx of DM2, has not had A1C checked in quite some time. Takes metformin 1000mg BID.  Needs refill    Review of Systems   Constitutional:  Negative for chills, fever and weight loss.   Respiratory:  Negative for shortness of breath.    Cardiovascular:  Negative for chest pain.   Gastrointestinal:  Negative for abdominal pain.   Endo/Heme/Allergies:  Negative for polydipsia.       Medications, Allergies, and current problem list reviewed today in Epic.     Objective:     /88   Pulse 74   Temp 36.7 °C (98 °F) (Temporal)   Resp 20   Ht 1.549 m (5' 1\")   Wt 109 kg (240 lb)   SpO2 96%     Physical Exam  Vitals and nursing note reviewed.   Constitutional:       Appearance: Normal appearance. She is not ill-appearing or toxic-appearing.   HENT:      Head: Normocephalic.      Mouth/Throat:      Mouth: Mucous membranes are moist.   Eyes:      Pupils: Pupils are equal, round, and reactive to light.   Cardiovascular:      Rate and Rhythm: Normal rate and regular rhythm.   Pulmonary:      Effort: Pulmonary effort is normal. No respiratory distress.      Breath sounds: Normal breath sounds.   Musculoskeletal:         General: Normal range of motion.      Cervical back: Normal range of motion.   Skin:     General: Skin is warm and dry.      Capillary Refill: Capillary refill takes less than 2 seconds.      Findings: No rash.   Neurological:      General: No focal deficit present.      Mental Status: She is alert and oriented to person, place, and time.   Psychiatric:         Mood and Affect: Mood normal.         Assessment/Plan:       1. Essential hypertension  valsartan (DIOVAN) 160 MG Tab    amLODIPine (NORVASC) 10 MG Tab      2. Type 2 diabetes mellitus " without complication, without long-term current use of insulin (HCC)  metformin (GLUCOPHAGE) 1000 MG tablet        Hypertension currently on valsartan 160 mg daily and amlodipine 10 mg daily, controlled with in-clinic reading 138/88.  Medication refilled.  Follow-up with primary care for ongoing issues    Type 2 diabetes, chronic, no recent A1c's as she has not had primary care, compliant with metformin 1000 mg twice daily.  Medication refilled. Healthy living strategies.    We also reviewed side effects of medication including allergic response, GI upset, tendon injury, rash, sedation etc. Patient and/or guardian voices understanding.      Advised the patient to follow-up with the primary care physician for recheck, reevaluation, and consideration of further management.    I personally reviewed prior external notes and test results pertinent to today's visit as well as additional imaging and testing completed in clinic today.     Please note that this dictation was created using voice recognition software. I have made every reasonable attempt to correct obvious errors, but I expect that there are errors of grammar and possibly content that I did not discover before finalizing the note.    This note was electronically signed by CYNTHIA Matamoros

## 2024-03-16 ENCOUNTER — HOSPITAL ENCOUNTER (EMERGENCY)
Facility: MEDICAL CENTER | Age: 64
End: 2024-03-16
Attending: EMERGENCY MEDICINE
Payer: COMMERCIAL

## 2024-03-16 VITALS
SYSTOLIC BLOOD PRESSURE: 132 MMHG | TEMPERATURE: 97.9 F | BODY MASS INDEX: 37.7 KG/M2 | WEIGHT: 199.52 LBS | RESPIRATION RATE: 18 BRPM | HEART RATE: 98 BPM | OXYGEN SATURATION: 95 % | DIASTOLIC BLOOD PRESSURE: 74 MMHG

## 2024-03-16 DIAGNOSIS — S61.211A LACERATION OF LEFT INDEX FINGER WITHOUT FOREIGN BODY WITHOUT DAMAGE TO NAIL, INITIAL ENCOUNTER: ICD-10-CM

## 2024-03-16 LAB — GLUCOSE BLD STRIP.AUTO-MCNC: 123 MG/DL (ref 65–99)

## 2024-03-16 PROCEDURE — 99282 EMERGENCY DEPT VISIT SF MDM: CPT

## 2024-03-16 PROCEDURE — 303353 HCHG DERMABOND SKIN ADHESIVE

## 2024-03-16 PROCEDURE — 82962 GLUCOSE BLOOD TEST: CPT

## 2024-03-16 PROCEDURE — 304999 HCHG REPAIR-SIMPLE/INTERMED LEVEL 1

## 2024-03-16 NOTE — ED NOTES
.DC home with written and verbal instructions regarding f/u, activityVerbalized understanding, ambulated out.

## 2024-03-16 NOTE — ED PROVIDER NOTES
ER Provider Note    Scribed for Andreas Bond M.d. by Bay Villagran. 3/16/2024  1:25 PM    Primary Care Provider: Pcp Pt States None    CHIEF COMPLAINT  Chief Complaint   Patient presents with    T-5000 Lacerations     Pt states she cut her left pointer finger with a knife cutting bagel      EXTERNAL RECORDS REVIEWED  Other None    HPI/ROS  LIMITATION TO HISTORY   Select: : None    OUTSIDE HISTORIAN(S):  Family The patient's family member was at bedside to provide additional context to history.     Gisela Eugene is a 64 y.o. female who presents to the ED complaining of a left finger laceration onset earlier today. The patient explains she was cutting a bagel this morning when she accidentally cut her left pointer finger prompting visitation to the ED. No alleviating or exacerbating factors noted. The patient is unsure of her last tetanus shot.     PAST MEDICAL HISTORY  Past Medical History:   Diagnosis Date    Asthma     Diabetes (HCC)     Hypertension     Spherocytosis (familial) (HCC)     Spherocytosis (familial) (HCC) 8/28/2018     SURGICAL HISTORY  Past Surgical History:   Procedure Laterality Date    CHOLECYSTECTOMY  1995    SPLENECTOMY  1994    PRIMARY C SECTION  1987    TONSILLECTOMY  1965     FAMILY HISTORY  Family History   Problem Relation Age of Onset    Diabetes Mother     Heart Disease Mother     Other Mother         parkinson    Other Father         spherocytosis     Other Sister         spherocytosis     Other Brother      SOCIAL HISTORY   reports that she has quit smoking. Her smoking use included cigarettes. She has never used smokeless tobacco. She reports that she does not use drugs.    CURRENT MEDICATIONS  Discharge Medication List as of 3/16/2024  2:23 PM        CONTINUE these medications which have NOT CHANGED    Details   !! metformin (GLUCOPHAGE) 1000 MG tablet Take 1 Tablet by mouth 2 times a day with meals for 90 days., Disp-60 Tablet, R-2, Normal      amLODIPine (NORVASC) 10 MG  Tab TAKE 1 TABLET ORALLY ONCE DAILY, Disp-90 Tablet, R-3, Normal      !! valsartan (DIOVAN) 160 MG Tab Take 1 Tablet by mouth every day., Disp-30 Tablet, R-2, Normal      !! valsartan (DIOVAN) 160 MG Tab TAKE 1 TABLET ORALLY ONCE DAILY, Disp-90 Tablet, R-3, Fax      ipratropium (ATROVENT HFA) 17 MCG/ACT Aero Soln Inhale 2 Puffs by mouth every 6 hours., Disp-12.9 g, R-0, Normal      atorvastatin (LIPITOR) 40 MG Tab Take 1 tablet by mouth every day., Disp-30 Tablet, R-0, Normal      !! montelukast (SINGULAIR) 10 MG Tab Take 1 Tablet by mouth every day., Disp-30 Tablet, R-5, Normal      !! metformin (GLUCOPHAGE) 1000 MG tablet Take 1 Tab by mouth 2 times a day, with meals., Disp-60 Tablet, R-6, Normal      latanoprost (XALATAN) 0.005 % Solution Instill 1 drop in both eyes every night at bedtime., Disp-2.5 mL, R-4, Normal      venlafaxine XR (EFFEXOR XR) 37.5 MG CAPSULE SR 24 HR Take 1 capsule by mouth every day., Disp-30 capsule, R-5, Normal      naproxen (NAPROSYN) 500 MG Tab TAKE 1 TABLET ORALLY EVERY 12 HOURS AS NEEDED, Disp-15 tablet, R-0, Normal      !! hydrOXYzine HCl (ATARAX) 25 MG Tab Take 1 tablet by mouth 1 time a day as needed for Itching., Disp-30 tablet, R-2, Normal      bimatoprost (LUMIGAN) 0.01 % Solution INSTILL 1 DROP IN EACH EYE NIGHTLY AT BEDTIME, Disp-2.5 mL, R-3, Normal      carbamazepine SR (TEGRETOL XR) 200 MG TABLET SR 12 HR extended-release tablet Take 2 Tabs by mouth 2 times a day., Disp-120 Tab, R-4, Normal      D3 SUPER STRENGTH 50 MCG (2000 UT) Cap TAKE 2 CAPSULES ORALLY (4000 UN) ONCE DAILY, Disp-60 Cap,R-0, Normal      potassium chloride SA (KDUR) 20 MEQ Tab CR TAKE 1 TABLET ORALLY 2 TIMES DAILY, Disp-20 Tab,R-2, Normal      oxybutynin SR (DITROPAN-XL) 10 MG CR tablet TAKE 1 TABLET ORALLY ONCE DAILY, Disp-30 Tab,R-5, Normal      albuterol (PROVENTIL) 2.5mg/3ml Nebu Soln solution for nebulization INHALE THE CONTENTS OF 1 VIAL VIA NEBULIZER EVERY 4 HOURS IF NEEDED FOR SHORTNESS OF BREATH,  Disp-1 Bullet, R-2, Normal      albuterol 108 (90 Base) MCG/ACT Aero Soln inhalation aerosol INHALE 2 PUFFS ORALLY EVERY 6 HOURS IF NEEDED FOR SHORTNESS OF BREATH, Disp-1 Inhaler, R-4, Normal      !! FLONASE ALLERGY RELIEF 50 MCG/ACT nasal spray SPRAY 1 SPRAY IN EACH NOSTRIL ONCE DAILY (60DS), Disp-1 Bottle, R-0, Normal      !! hydrOXYzine HCl (ATARAX) 25 MG Tab Take 1 Tab by mouth 3 times a day as needed for Itching., Disp-30 Tab, R-0, Normal      azithromycin (ZITHROMAX) 250 MG Tab 2 tab first day , then 1 tab daily x 4 days, Disp-6 Tab, R-0, Normal      methylPREDNISolone (MEDROL DOSEPAK) 4 MG Tablet Therapy Pack As per pack direction, Disp-1 Kit, R-0, Normal      !! fluticasone (FLONASE) 50 MCG/ACT nasal spray Spray 1 Spray in nose every day., Disp-16 g, R-1, Print Rx Paper      !! montelukast (SINGULAIR) 10 MG Tab TAKE 1 TABLET ORALLY ONCE DAILY, Disp-30 Tab, R-6, Normal      cyclobenzaprine (FLEXERIL) 10 MG Tab Take 1 Tab by mouth 1 time daily as needed., Disp-10 Tab, R-0, Print Rx Paper      SUMAtriptan (IMITREX) 25 MG Tab tablet Take 1 Tab by mouth Once PRN for Migraine for up to 1 dose., Disp-10 Tab, R-0, Normal      !! fluticasone (FLONASE) 50 MCG/ACT nasal spray Spray 1 Spray in nose every day., Disp-16 g, R-1, Print Rx Paper      !! Misc. Devices Misc As directed and neededNebulizer machine   Dx: J45.30Disp-1 Application, R-0, Print Rx Paper      !! Misc. Devices Misc As directedHumidifier machine  Dx : J45.30Disp-1 Application, R-0, Print Rx Paper      tramadol (ULTRAM) 50 MG Tab Take 50 mg by mouth every four hours as needed., Historical Med      Spacer/Aero-Holding Chambers (BREATHERITE HAROON SPACER ADULT) Misc Spacer for use with Inhaler Generic or Brand name as substitution if covered by insurance, Disp-1 Each, R-1, Print Rx Paper      !! Lancets Misc Lancets for use with glucometer, daily and if symptoms, Disp-100 Each, R-5, Print Rx Paper      estradiol (VAGIFEM) 10 MCG Tab Insert 1 Tab in vagina  every 3 days., Disp-8 Tab, R-2, Print Rx Paper      glucose blood (TRUE METRIX BLOOD GLUCOSE TEST) strip 1 Strip by Other route as needed., Disp-50 Strip, R-2, Normal      Blood Glucose Monitoring Suppl (TRUE METRIX AIR GLUCOSE METER) Device 1 Each by Does not apply route every day., Disp-1 Device, R-0, Normal      !! TECHLITE LANCETS Misc Historical Med       !! - Potential duplicate medications found. Please discuss with provider.        ALLERGIES  Voltaren [diclofenac-disod edta]    PHYSICAL EXAM  VITAL SIGNS: BP (!) 137/103   Pulse (!) 111   Temp 36.3 °C (97.3 °F) (Temporal)   Resp 16   Wt 90.5 kg (199 lb 8.3 oz)   LMP 11/29/2009   SpO2 96%   BMI 37.70 kg/m²     Pulse ox interpretation: I interpret this pulse ox as normal.  Constitutional: Alert in no apparent distress.  HENT: No signs of trauma, Bilateral external ears normal, Nose normal.   Eyes: Pupils are equal and reactive, Conjunctiva normal, Non-icteric.   Neck: Normal range of motion, Supple, No stridor.    Cardiovascular: Normal peripheral perfusion  Thorax & Lungs: Unlabored respirations, equal chest expansion, no accessory muscle use  Abdomen: Non-distended  Skin:  No erythema, No rash.   Back: Normal alignment and ROM  Extremities: Left middle phalanx has a 2 cm clean and linear laceration that is non bleeding.  Musculoskeletal: Good range of motion in all major joints.   Neurologic: Alert, Normal motor function, No focal deficits noted.   Psychiatric: Affect normal, Judgment normal, Mood normal.    Laceration Repair Procedure    Indication: Laceration    Location/Description: Left middle phalanx of pointer finger has a 2 cm clean and linear laceration that is non bleeding    Procedure: The patient was placed in the appropriate position. The area was then cleaned with soap and water. The laceration was closed with Dermabond. There were no additional lacerations requiring repair. The wound area was then dressed with splint and bandage.       Total repaired wound length: 2 cm.     Other Items: None    The patient tolerated the procedure well.    Complications: None     COURSE & MEDICAL DECISION MAKING     ED Observation Status? No; Patient does not meet criteria for ED Observation.     INITIAL ASSESSMENT, COURSE AND PLAN  Care Narrative:     1:25 PM Patient presents to the ED with a laceration to the left middle finger. Patient evaluated at bedside and discussed plan of care, including a laceration repair. Explained that the patient will not need stitches since the wound was cut clean, but will still glue the wound close and provide a stint.  Patient verbalizes understanding and agreement to this plan of care.     2:07 PM - Laceration repair was performed by me as noted above.  I placed the patient in a finger splint, to limit flexion at the affected PIP joint for the next 72 hours.  Wound care discussed.  Return precautions discussed. I discussed plan for discharge and follow up as outlined below. The patient is stable for discharge at this time and will return for any new or worsening symptoms. Patient verbalizes understanding and support with my plan for discharge.         DISPOSITION AND DISCUSSIONS    Escalation of care considered, and ultimately not performed: blood analysis.  Considered, but ultimately there was no evidence of infection or systemic illness necessitating these tests.    Barriers to care at this time, including but not limited to: Patient does not have established PCP.     Decision tools and prescription drugs considered including, but not limited to: Antibiotics considered, but this is a small, clean, linear wound, uncontaminated, appropriate for expectant management .     The patient will return for new or worsening symptoms and is stable at the time of discharge.    DISPOSITION:  Patient will be discharged home in stable condition.    FOLLOW UP:  [facility], Emergency Dept  [facility] [facility]  [id number]  [phone]    If symptoms worsen    FINAL DIAGNOSIS  1. Laceration of right index finger without foreign body without damage to nail, initial encounter    2. Laceration repair performed as seen above.       IBay (Scribe), am scribing for, and in the presence of, Andreas Bond M.D..    Electronically signed by: Bay Villagran (Scribe), 3/16/2024    IAndreas M.D. personally performed the services described in this documentation, as scribed by Bay Villagran in my presence, and it is both accurate and complete.

## 2024-03-16 NOTE — ED TRIAGE NOTES
Pt to triage .  Chief Complaint   Patient presents with    T-5000 Lacerations     Pt states she cut her left pointer finger with a knife cutting bagel

## 2024-03-16 NOTE — DISCHARGE INSTRUCTIONS
Keep our bandage in place for the first 48 to 72 hours, then keep the splint in place except for when bathing, for 5 to 7 days, until the laceration has fully healed.  Under medical care for any signs of infection such as increasing redness or swelling or drainage or pain as opposed to gradual improvement.

## 2024-03-26 NOTE — DOCUMENTATION QUERY
[facility]                                                                       Query Response Note      PATIENT:               MOE COLE  ACCT #:                  [id number]  MRN:                     [id number]  :                      1960  ADMIT DATE:       3/16/2024 12:36 PM  DISCH DATE:        3/16/2024 2:27 PM  RESPONDING  PROVIDER #:        [id number]           QUERY TEXT:    There is conflicting documentation in the physical exam and final impression.      Based on treatment, clinical findings can this documentation be further clarified?    The patient's Clinical Indicators include:  The chief complaint is a laceration of the left pointer (index) finger.  In the physical exam and laceration repair procedure state left middle finger. The final impression states left index finger.      *Clinical indicators    Patient came in with a laceration to the finger from cutting it with a knife while cutting a bagel    2 cm laceration    *Treatment or Monitoring     laceration was washed and close with Dermabond    * Related conditions     none    If you have any questions regarding this query, please contact me at [email or web address].  Thank you.  Options provided:   -- Laceration of left index finger   -- Laceration of left middle finger      Query created by: Chantal Milian on 3/26/2024 6:05 AM    RESPONSE TEXT:    The physical exam does not say left middle finger. It says left middle phalanx. This is in reference to the pointer finger.          Electronically signed by:  SERAFIN KITCHEN MD 3/26/2024 2:24 PM

## 2024-04-05 ENCOUNTER — OFFICE VISIT (OUTPATIENT)
Dept: MEDICAL GROUP | Facility: MEDICAL CENTER | Age: 64
End: 2024-04-05
Attending: NURSE PRACTITIONER
Payer: COMMERCIAL

## 2024-04-05 ENCOUNTER — HOSPITAL ENCOUNTER (OUTPATIENT)
Dept: LAB | Facility: MEDICAL CENTER | Age: 64
End: 2024-04-05
Attending: NURSE PRACTITIONER
Payer: COMMERCIAL

## 2024-04-05 ENCOUNTER — HOSPITAL ENCOUNTER (OUTPATIENT)
Dept: RADIOLOGY | Facility: MEDICAL CENTER | Age: 64
End: 2024-04-05
Attending: NURSE PRACTITIONER
Payer: COMMERCIAL

## 2024-04-05 VITALS
DIASTOLIC BLOOD PRESSURE: 98 MMHG | WEIGHT: 193 LBS | SYSTOLIC BLOOD PRESSURE: 138 MMHG | HEART RATE: 80 BPM | OXYGEN SATURATION: 96 % | TEMPERATURE: 97.3 F | HEIGHT: 61 IN | RESPIRATION RATE: 16 BRPM | BODY MASS INDEX: 36.44 KG/M2

## 2024-04-05 DIAGNOSIS — E55.9 VITAMIN D DEFICIENCY: ICD-10-CM

## 2024-04-05 DIAGNOSIS — E11.9 TYPE 2 DIABETES MELLITUS WITHOUT COMPLICATION, WITHOUT LONG-TERM CURRENT USE OF INSULIN (HCC): ICD-10-CM

## 2024-04-05 DIAGNOSIS — M17.11 PRIMARY OSTEOARTHRITIS OF RIGHT KNEE: ICD-10-CM

## 2024-04-05 DIAGNOSIS — E78.49 OTHER HYPERLIPIDEMIA: ICD-10-CM

## 2024-04-05 DIAGNOSIS — Z11.59 NEED FOR HEPATITIS C SCREENING TEST: ICD-10-CM

## 2024-04-05 DIAGNOSIS — R63.4 WEIGHT LOSS: ICD-10-CM

## 2024-04-05 DIAGNOSIS — J45.40 MODERATE PERSISTENT ASTHMA WITHOUT COMPLICATION: ICD-10-CM

## 2024-04-05 DIAGNOSIS — H53.9 VISION CHANGES: ICD-10-CM

## 2024-04-05 DIAGNOSIS — J45.30 MILD PERSISTENT ASTHMA, UNCOMPLICATED: ICD-10-CM

## 2024-04-05 DIAGNOSIS — R21 RASH: ICD-10-CM

## 2024-04-05 DIAGNOSIS — Z11.3 ROUTINE SCREENING FOR STI (SEXUALLY TRANSMITTED INFECTION): ICD-10-CM

## 2024-04-05 DIAGNOSIS — Z12.11 COLON CANCER SCREENING: ICD-10-CM

## 2024-04-05 DIAGNOSIS — Z12.31 ENCOUNTER FOR SCREENING MAMMOGRAM FOR MALIGNANT NEOPLASM OF BREAST: ICD-10-CM

## 2024-04-05 DIAGNOSIS — I10 ESSENTIAL HYPERTENSION: ICD-10-CM

## 2024-04-05 DIAGNOSIS — G89.29 CHRONIC PAIN OF RIGHT KNEE: ICD-10-CM

## 2024-04-05 DIAGNOSIS — M25.561 CHRONIC PAIN OF RIGHT KNEE: ICD-10-CM

## 2024-04-05 LAB
25(OH)D3 SERPL-MCNC: 27 NG/ML (ref 30–100)
EST. AVERAGE GLUCOSE BLD GHB EST-MCNC: 120 MG/DL
HBA1C MFR BLD: 5.8 % (ref 4–5.6)
HCV AB SER QL: NORMAL
HIV 1+2 AB+HIV1 P24 AG SERPL QL IA: NORMAL
T PALLIDUM AB SER QL IA: NORMAL
T4 FREE SERPL-MCNC: 1.19 NG/DL (ref 0.93–1.7)
TSH SERPL DL<=0.005 MIU/L-ACNC: 2.24 UIU/ML (ref 0.38–5.33)

## 2024-04-05 PROCEDURE — 99214 OFFICE O/P EST MOD 30 MIN: CPT | Performed by: NURSE PRACTITIONER

## 2024-04-05 PROCEDURE — 84443 ASSAY THYROID STIM HORMONE: CPT

## 2024-04-05 PROCEDURE — 36415 COLL VENOUS BLD VENIPUNCTURE: CPT

## 2024-04-05 PROCEDURE — 84439 ASSAY OF FREE THYROXINE: CPT

## 2024-04-05 PROCEDURE — 83036 HEMOGLOBIN GLYCOSYLATED A1C: CPT

## 2024-04-05 PROCEDURE — 3080F DIAST BP >= 90 MM HG: CPT | Performed by: NURSE PRACTITIONER

## 2024-04-05 PROCEDURE — 80061 LIPID PANEL: CPT

## 2024-04-05 PROCEDURE — 3075F SYST BP GE 130 - 139MM HG: CPT | Performed by: NURSE PRACTITIONER

## 2024-04-05 PROCEDURE — 76882 US LMTD JT/FCL EVL NVASC XTR: CPT | Mod: RT

## 2024-04-05 PROCEDURE — 80053 COMPREHEN METABOLIC PANEL: CPT

## 2024-04-05 PROCEDURE — 82306 VITAMIN D 25 HYDROXY: CPT

## 2024-04-05 PROCEDURE — 87389 HIV-1 AG W/HIV-1&-2 AB AG IA: CPT

## 2024-04-05 PROCEDURE — 86803 HEPATITIS C AB TEST: CPT

## 2024-04-05 PROCEDURE — 86780 TREPONEMA PALLIDUM: CPT

## 2024-04-05 RX ORDER — AMLODIPINE BESYLATE 10 MG/1
TABLET ORAL
Qty: 90 TABLET | Refills: 3 | Status: SHIPPED | OUTPATIENT
Start: 2024-04-05

## 2024-04-05 RX ORDER — ALBUTEROL SULFATE 90 UG/1
AEROSOL, METERED RESPIRATORY (INHALATION)
Qty: 1 EACH | Refills: 4 | Status: SHIPPED | OUTPATIENT
Start: 2024-04-05

## 2024-04-05 RX ORDER — VALSARTAN 160 MG/1
TABLET ORAL
Qty: 90 TABLET | Refills: 3 | Status: SHIPPED | OUTPATIENT
Start: 2024-04-05

## 2024-04-05 RX ORDER — CEPHALEXIN 500 MG/1
500 CAPSULE ORAL 4 TIMES DAILY
Qty: 28 CAPSULE | Refills: 0 | Status: SHIPPED | OUTPATIENT
Start: 2024-04-05 | End: 2024-04-12

## 2024-04-05 RX ORDER — INHALER, ASSIST DEVICES
SPACER (EA) MISCELLANEOUS
Qty: 1 EACH | Refills: 1 | Status: SHIPPED | OUTPATIENT
Start: 2024-04-05

## 2024-04-06 LAB
ALBUMIN SERPL BCP-MCNC: 4.5 G/DL (ref 3.2–4.9)
ALBUMIN/GLOB SERPL: 1.5 G/DL
ALP SERPL-CCNC: 74 U/L (ref 30–99)
ALT SERPL-CCNC: 13 U/L (ref 2–50)
ANION GAP SERPL CALC-SCNC: 16 MMOL/L (ref 7–16)
AST SERPL-CCNC: 22 U/L (ref 12–45)
BILIRUB SERPL-MCNC: 0.7 MG/DL (ref 0.1–1.5)
BUN SERPL-MCNC: 14 MG/DL (ref 8–22)
CALCIUM ALBUM COR SERPL-MCNC: 9.3 MG/DL (ref 8.5–10.5)
CALCIUM SERPL-MCNC: 9.7 MG/DL (ref 8.5–10.5)
CHLORIDE SERPL-SCNC: 101 MMOL/L (ref 96–112)
CHOLEST SERPL-MCNC: 254 MG/DL (ref 100–199)
CO2 SERPL-SCNC: 25 MMOL/L (ref 20–33)
CREAT SERPL-MCNC: 0.55 MG/DL (ref 0.5–1.4)
GFR SERPLBLD CREATININE-BSD FMLA CKD-EPI: 102 ML/MIN/1.73 M 2
GLOBULIN SER CALC-MCNC: 3.1 G/DL (ref 1.9–3.5)
GLUCOSE SERPL-MCNC: 108 MG/DL (ref 65–99)
HDLC SERPL-MCNC: 69 MG/DL
LDLC SERPL CALC-MCNC: 159 MG/DL
POTASSIUM SERPL-SCNC: 3.6 MMOL/L (ref 3.6–5.5)
PROT SERPL-MCNC: 7.6 G/DL (ref 6–8.2)
SODIUM SERPL-SCNC: 142 MMOL/L (ref 135–145)
TRIGL SERPL-MCNC: 130 MG/DL (ref 0–149)

## 2024-04-08 NOTE — PROGRESS NOTES
Verbal consent was acquired by the patient to use [employer] ambient listening note generation during this visit     Chief Complaint   Patient presents with    [facility]       Subjective:     HPI:   History of Present Illness  The patient is a 64-year-old female who presents to [facility].    The patient has successfully achieved a weight loss of 50 pounds since her last visit through dietary modifications. Her current weight is 192 pounds, a decrease from her previous weight of 240 to 250 pounds. She expresses enthusiasm for her weight loss journey.    The patient reports pruritus on her arm, accompanied by a palpable mass beneath the skin. She has attempted self-treatment with Neosporin, anti-itch cream, and regular lotion, but to no avail. She has also tried a triple antibiotic and hydrocortisone cream.    The patient reports chronic knee issues, characterized by bone-on-bone contact. She has previously consulted with an orthopedic specialist and received a cortisone injection. Her current plan is to engage in physical therapy to improve her knee condition. She has no plans for knee replacement surgery. She expresses interest in water aerobics.    The patient reports frequent episodes of diarrhea and loose stools, which she attributes to her dietary modifications. She initially attributed these symptoms to milk consumption, but later transitioned to lactose-free milk. She has not undergone a colonoscopy. She manages her diarrhea with over-the-counter Imodium.    The patient reports no issues with her asthma and does not take any medication for it. She takes Claritin for allergy flare-ups. She experiences wheezing after walking long distances.    Supplemental Information  She is 99 percent sure she has cataract in her left eye. She can see peripherally. She can see from shoulders down, but she can not see anything. She would like a referral to see her ophthalmologist. She had a mammogram 5 years ago and  it was normal. She had a splenectomy in 1995 due to spherocytosis anemia. She had a tonsillectomy when she was 5 years old.   Her mother and sister are lactose intolerant. Two of her 5 children have lactose intolerance.        No problems updated.    ROS  See HPI     Allergies   Allergen Reactions    Voltaren [Diclofenac-Disod Edta] Itching       Current medicines (including changes today)  Current Outpatient Medications   Medication Sig Dispense Refill    cephALEXin (KEFLEX) 500 MG Cap Take 1 Capsule by mouth 4 times a day for 7 days. 28 Capsule 0    amLODIPine (NORVASC) 10 MG Tab TAKE 1 TABLET ORALLY ONCE DAILY 90 Tablet 3    metformin (GLUCOPHAGE) 1000 MG tablet Take 1 Tablet by mouth 2 times a day with meals for 90 days. 60 Tablet 2    valsartan (DIOVAN) 160 MG Tab TAKE 1 TABLET ORALLY ONCE DAILY 90 Tablet 3    albuterol 108 (90 Base) MCG/ACT Aero Soln inhalation aerosol INHALE 2 PUFFS ORALLY EVERY 6 HOURS IF NEEDED FOR SHORTNESS OF BREATH 1 Each 4    Spacer/Aero-Holding Chambers (BREATHERITE HAROON SPACER ADULT) Misc Spacer for use with Inhaler Generic or Brand name as substitution if covered by insurance 1 Each 1     No current facility-administered medications for this visit.       Social History     Tobacco Use    Smoking status: Former     Types: Cigarettes    Smokeless tobacco: Never   Substance Use Topics    Drug use: No       Patient Active Problem List    Diagnosis Date Noted    Acquired asplenia 04/05/2019    Trigeminal neuralgia 02/06/2019    Spherocytosis (familial) ([facility]) 08/28/2018    Hot flashes 06/05/2018    Thrombocytosis 05/16/2018    Stress incontinence of urine 11/29/2017    Fecal occult blood test positive 10/04/2017    Environmental allergies 03/30/2017    Morbid obesity with BMI of 45.0-49.9, adult ([facility]) 02/16/2017    Insomnia 07/07/2016    Hyperlipidemia 06/09/2016    Osteoarthritis of knee 04/14/2016    Vision problems 03/24/2016    Right knee pain 03/24/2016    Vitamin D deficiency  "11/12/2015    Type 2 diabetes mellitus without complication (HCC) 11/12/2015    Essential hypertension 10/22/2015    Moderate persistent asthma 10/22/2015    Episodic headache 10/22/2015    H/O splenectomy 10/22/2015       Family History   Problem Relation Age of Onset    Diabetes Mother     Heart Disease Mother     Other Mother         parkinson    Other Father         spherocytosis     Other Sister         spherocytosis     Other Brother           Objective:     BP (!) 138/98 (BP Location: Left arm, Patient Position: Standing, BP Cuff Size: Adult long)   Pulse 80   Temp 36.3 °C (97.3 °F) (Temporal)   Resp 16   Ht 1.549 m (5' 0.98\")   Wt 87.5 kg (193 lb)   SpO2 96%  Body mass index is 36.49 kg/m².    Physical Exam:  Physical Exam  Vitals reviewed.   Constitutional:       General: She is awake.      Appearance: Normal appearance. She is well-developed. She is obese.   HENT:      Head: Normocephalic.      Nose: Nose normal.      Mouth/Throat:      Mouth: Mucous membranes are moist.      Pharynx: Oropharynx is clear. No oropharyngeal exudate.   Eyes:      Conjunctiva/sclera: Conjunctivae normal.      Pupils: Pupils are equal, round, and reactive to light.   Cardiovascular:      Rate and Rhythm: Normal rate and regular rhythm.      Heart sounds: Normal heart sounds.   Pulmonary:      Effort: Pulmonary effort is normal. No respiratory distress.      Breath sounds: Normal breath sounds. No wheezing.   Abdominal:      General: There is no distension.   Musculoskeletal:      Cervical back: Neck supple. No tenderness.   Lymphadenopathy:      Cervical: No cervical adenopathy.   Skin:     General: Skin is warm and dry.   Neurological:      Mental Status: She is alert and oriented to person, place, and time.   Psychiatric:         Mood and Affect: Mood normal.         Behavior: Behavior normal. Behavior is cooperative.              Assessment and Plan:     The following treatment plan was discussed:    Problem List Items " Addressed This Visit       Essential hypertension    Relevant Medications    amLODIPine (NORVASC) 10 MG Tab    valsartan (DIOVAN) 160 MG Tab    Moderate persistent asthma    Relevant Medications    albuterol 108 (90 Base) MCG/ACT Aero Soln inhalation aerosol    Spacer/Aero-Holding Chambers (BREATHERITE HAROON SPACER ADULT) Misc    Vitamin D deficiency    Relevant Orders    VITAMIN D,25 HYDROXY (DEFICIENCY) (Completed)    Type 2 diabetes mellitus without complication (HCC)    Relevant Medications    metformin (GLUCOPHAGE) 1000 MG tablet    Other Relevant Orders    HEMOGLOBIN A1C (Completed)    Lipid Profile (Completed)    TSH (Completed)    FREE THYROXINE (Completed)    Right knee pain    Relevant Orders    Referral to Orthopedics    Osteoarthritis of knee    Hyperlipidemia    Relevant Medications    amLODIPine (NORVASC) 10 MG Tab    valsartan (DIOVAN) 160 MG Tab    Other Relevant Orders    Comp Metabolic Panel (Completed)     Other Visit Diagnoses       Weight loss        Relevant Orders    TSH (Completed)    FREE THYROXINE (Completed)    Comp Metabolic Panel (Completed)    Vision changes        Relevant Orders    Referral to Ophthalmology    Need for hepatitis C screening test        Relevant Orders    HEP C VIRUS ANTIBODY (Completed)    Routine screening for STI (sexually transmitted infection)        Relevant Orders    HIV AG/AB COMBO ASSAY SCREENING (Completed)    T.PALLIDUM AB YOSELYN (SCREENING) (Completed)    Colon cancer screening        Relevant Orders    Referral to GI for Colonoscopy    Rash        Relevant Medications    cephALEXin (KEFLEX) 500 MG Cap    Other Relevant Orders    US-EXTREMITY NON VASCULAR UNILATERAL RIGHT (Completed)    Encounter for screening mammogram for malignant neoplasm of breast        Relevant Orders    MA-SCREENING MAMMO BILAT W/TOMOSYNTHESIS W/CAD    Mild persistent asthma, uncomplicated        Relevant Medications    albuterol 108 (90 Base) MCG/ACT Aero Soln inhalation aerosol             Assessment & Plan  1. Knee pain.  A referral to orthopedics has been initiated. Encouraged her to use heat or ice as tolerated. She is continuing to lose weight which should help her knees.     2. Diarrhea.  The patient's diarrhea could potentially be attributed to her dietary modifications. The patient has been advised to undergo a colonoscopy as she is 89 and has never had one completed. Given the recent stool changes and the unintentional weight loss of about 50lbs it is important for her to be seen by GI to ensure no acute findings.     3. Lesion on the arm.  The lesion could potentially be a result of an insect bite. An ultrasound will be ordered to exclude the possibility of an abscess. Antibiotic therapy will be initiated.    4. Asthma.  An albuterol inhaler will be prescribed for the patient to use during exercise. Spacer has also been included.     She will follow up with us after colonoscopy results obtained     Any change or worsening of signs or symptoms, patient encouraged to follow-up or report to emergency room for further evaluation. Patient verbalizes understanding and agrees.      PLEASE NOTE: This dictation was created using voice recognition software. I have made every reasonable attempt to correct obvious errors, but I expect that there are errors of grammar and possibly content that I did not discover before finalizing the note.

## 2024-04-19 ENCOUNTER — APPOINTMENT (OUTPATIENT)
Dept: RADIOLOGY | Facility: MEDICAL CENTER | Age: 64
End: 2024-04-19
Attending: NURSE PRACTITIONER
Payer: COMMERCIAL

## 2024-05-10 ENCOUNTER — HOSPITAL ENCOUNTER (OUTPATIENT)
Dept: RADIOLOGY | Facility: MEDICAL CENTER | Age: 64
End: 2024-05-10
Attending: NURSE PRACTITIONER
Payer: COMMERCIAL

## 2024-05-10 DIAGNOSIS — Z12.31 ENCOUNTER FOR SCREENING MAMMOGRAM FOR MALIGNANT NEOPLASM OF BREAST: ICD-10-CM

## 2024-05-13 ENCOUNTER — OFFICE VISIT (OUTPATIENT)
Dept: MEDICAL GROUP | Facility: MEDICAL CENTER | Age: 64
End: 2024-05-13
Attending: NURSE PRACTITIONER
Payer: COMMERCIAL

## 2024-05-13 ENCOUNTER — TELEPHONE (OUTPATIENT)
Dept: MEDICAL GROUP | Facility: MEDICAL CENTER | Age: 64
End: 2024-05-13

## 2024-05-13 ENCOUNTER — APPOINTMENT (OUTPATIENT)
Dept: RADIOLOGY | Facility: IMAGING CENTER | Age: 64
End: 2024-05-13
Attending: ORTHOPAEDIC SURGERY
Payer: COMMERCIAL

## 2024-05-13 ENCOUNTER — HOSPITAL ENCOUNTER (OUTPATIENT)
Facility: MEDICAL CENTER | Age: 64
End: 2024-05-13
Attending: NURSE PRACTITIONER
Payer: COMMERCIAL

## 2024-05-13 ENCOUNTER — OFFICE VISIT (OUTPATIENT)
Dept: SURGICAL ONCOLOGY | Facility: MEDICAL CENTER | Age: 64
End: 2024-05-13
Payer: COMMERCIAL

## 2024-05-13 VITALS
DIASTOLIC BLOOD PRESSURE: 70 MMHG | SYSTOLIC BLOOD PRESSURE: 128 MMHG | HEART RATE: 73 BPM | HEIGHT: 62 IN | TEMPERATURE: 97.9 F | OXYGEN SATURATION: 97 % | WEIGHT: 189 LBS | BODY MASS INDEX: 34.78 KG/M2

## 2024-05-13 DIAGNOSIS — G47.00 INSOMNIA, UNSPECIFIED TYPE: ICD-10-CM

## 2024-05-13 DIAGNOSIS — Z23 NEED FOR VACCINATION: ICD-10-CM

## 2024-05-13 DIAGNOSIS — N30.01 ACUTE CYSTITIS WITH HEMATURIA: ICD-10-CM

## 2024-05-13 DIAGNOSIS — E11.9 TYPE 2 DIABETES MELLITUS WITHOUT COMPLICATION (HCC): ICD-10-CM

## 2024-05-13 DIAGNOSIS — G89.29 CHRONIC PAIN OF RIGHT KNEE: ICD-10-CM

## 2024-05-13 DIAGNOSIS — M17.11 PRIMARY OSTEOARTHRITIS OF RIGHT KNEE: ICD-10-CM

## 2024-05-13 DIAGNOSIS — F43.9 STRESS: ICD-10-CM

## 2024-05-13 DIAGNOSIS — R39.9 UTI SYMPTOMS: ICD-10-CM

## 2024-05-13 DIAGNOSIS — M17.12 PRIMARY OSTEOARTHRITIS OF LEFT KNEE: ICD-10-CM

## 2024-05-13 DIAGNOSIS — M25.561 CHRONIC PAIN OF RIGHT KNEE: ICD-10-CM

## 2024-05-13 LAB
AMBIGUOUS DTTM AMBI4: NORMAL
APPEARANCE UR: CLEAR
BILIRUB UR STRIP-MCNC: NORMAL MG/DL
COLOR UR AUTO: YELLOW
CREAT UR-MCNC: 100.52 MG/DL
GLUCOSE UR STRIP.AUTO-MCNC: NORMAL MG/DL
KETONES UR STRIP.AUTO-MCNC: NORMAL MG/DL
LEUKOCYTE ESTERASE UR QL STRIP.AUTO: NORMAL
MICROALBUMIN UR-MCNC: 27.5 MG/DL
MICROALBUMIN/CREAT UR: 274 MG/G (ref 0–30)
NITRITE UR QL STRIP.AUTO: NORMAL
PH UR STRIP.AUTO: 5.5 [PH] (ref 5–8)
PROT UR QL STRIP: 100 MG/DL
RBC UR QL AUTO: NORMAL
SP GR UR STRIP.AUTO: 1.02
UROBILINOGEN UR STRIP-MCNC: 0.2 MG/DL

## 2024-05-13 PROCEDURE — 99204 OFFICE O/P NEW MOD 45 MIN: CPT | Performed by: ORTHOPAEDIC SURGERY

## 2024-05-13 PROCEDURE — 3074F SYST BP LT 130 MM HG: CPT | Performed by: ORTHOPAEDIC SURGERY

## 2024-05-13 PROCEDURE — 3078F DIAST BP <80 MM HG: CPT | Performed by: ORTHOPAEDIC SURGERY

## 2024-05-13 PROCEDURE — 73564 X-RAY EXAM KNEE 4 OR MORE: CPT | Mod: TC,RT | Performed by: ORTHOPAEDIC SURGERY

## 2024-05-13 PROCEDURE — 99214 OFFICE O/P EST MOD 30 MIN: CPT | Performed by: NURSE PRACTITIONER

## 2024-05-13 RX ORDER — RAMELTEON 8 MG/1
8 TABLET ORAL NIGHTLY
Qty: 30 TABLET | Refills: 6 | Status: SHIPPED | OUTPATIENT
Start: 2024-05-13 | End: 2024-05-21

## 2024-05-13 RX ORDER — NITROFURANTOIN 25; 75 MG/1; MG/1
100 CAPSULE ORAL 2 TIMES DAILY
Qty: 10 CAPSULE | Refills: 0 | Status: SHIPPED | OUTPATIENT
Start: 2024-05-13 | End: 2024-05-18

## 2024-05-13 ASSESSMENT — PATIENT HEALTH QUESTIONNAIRE - PHQ9
CLINICAL INTERPRETATION OF PHQ2 SCORE: 3
5. POOR APPETITE OR OVEREATING: 2 - MORE THAN HALF THE DAYS
SUM OF ALL RESPONSES TO PHQ QUESTIONS 1-9: 14

## 2024-05-13 ASSESSMENT — ANXIETY QUESTIONNAIRES
4. TROUBLE RELAXING: NEARLY EVERY DAY
7. FEELING AFRAID AS IF SOMETHING AWFUL MIGHT HAPPEN: NOT AT ALL
GAD7 TOTAL SCORE: 11
2. NOT BEING ABLE TO STOP OR CONTROL WORRYING: NEARLY EVERY DAY
6. BECOMING EASILY ANNOYED OR IRRITABLE: NOT AT ALL
3. WORRYING TOO MUCH ABOUT DIFFERENT THINGS: NEARLY EVERY DAY
5. BEING SO RESTLESS THAT IT IS HARD TO SIT STILL: NOT AT ALL
1. FEELING NERVOUS, ANXIOUS, OR ON EDGE: MORE THAN HALF THE DAYS

## 2024-05-13 NOTE — PATIENT INSTRUCTIONS
[facility]  [address] [place] [address]  Phone: [phone]

## 2024-05-13 NOTE — PROGRESS NOTES
Subjective:   5/13/2024 10:10 AM  Primary care physician:CYNTHIA Melchor    Chief Complaint:   Knee pain bilateral right>left    History of presenting illness:  Gisela Eugene  is a pleasant 64 y.o. female who was referred for evaluation of bilateral knee pain.  She reports she has had knee pain for several years, but it has gotten worse over time.  Previously she has tried over-the-counter anti-inflammatories and corticosteroid injections that provided short-term relief.  She has recently lost 60 pounds and reports that this has greatly improved her pain as well.  The left knee is not bothering her near as much after the weight loss.  The right knee however continues to be quite painful located along the medial aspect of the joint radiating into the shin.  She is not using any bracing.  She has not had any other interventions for her knee pain.    History of diabetes: yes  History of tobacco use: former  History of renal disease: no  Use of anticoagulants: no  Prior surgeries on this limb/joint: no    Past Medical History:   Diagnosis Date    Asthma     Diabetes (HCC)     Hypertension     Spherocytosis (familial) (HCC)     Spherocytosis (familial) (HCC) 8/28/2018     Past Surgical History:   Procedure Laterality Date    CHOLECYSTECTOMY  1995    SPLENECTOMY  1994    PRIMARY C SECTION  1987    TONSILLECTOMY  1965     Allergies   Allergen Reactions    Voltaren [Diclofenac-Disod Edta] Itching     Outpatient Encounter Medications as of 5/13/2024   Medication Sig Dispense Refill    ramelteon (ROZEREM) 8 MG tablet Take 1 Tablet by mouth every evening. 30 Tablet 6    nitrofurantoin (MACROBID) 100 MG Cap Take 1 Capsule by mouth 2 times a day for 5 days. 10 Capsule 0    amLODIPine (NORVASC) 10 MG Tab TAKE 1 TABLET ORALLY ONCE DAILY 90 Tablet 3    metformin (GLUCOPHAGE) 1000 MG tablet Take 1 Tablet by mouth 2 times a day with meals for 90 days. 60 Tablet 2    valsartan (DIOVAN) 160 MG Tab TAKE 1 TABLET ORALLY  ONCE DAILY 90 Tablet 3    albuterol 108 (90 Base) MCG/ACT Aero Soln inhalation aerosol INHALE 2 PUFFS ORALLY EVERY 6 HOURS IF NEEDED FOR SHORTNESS OF BREATH 1 Each 4    Spacer/Aero-Holding Chambers (BREATHERITE HAROON SPACER ADULT) Misc Spacer for use with Inhaler Generic or Brand name as substitution if covered by insurance (Patient not taking: Reported on 5/13/2024) 1 Each 1     No facility-administered encounter medications on file as of 5/13/2024.     Social History     Socioeconomic History    Marital status: Single     Spouse name: Not on file    Number of children: Not on file    Years of education: Not on file    Highest education level: Not on file   Occupational History     Employer: OTHER   Tobacco Use    Smoking status: Former     Types: Cigarettes    Smokeless tobacco: Never   Substance and Sexual Activity    Alcohol use: Not on file    Drug use: No    Sexual activity: Not Currently     Partners: Male   Other Topics Concern    Not on file   Social History Narrative    Not on file     Social Determinants of Health     Financial Resource Strain: Not on file   Food Insecurity: Not on file   Transportation Needs: Not on file   Physical Activity: Not on file   Stress: Not on file   Social Connections: Not on file   Intimate Partner Violence: Not on file   Housing Stability: Not on file      Social History     Tobacco Use   Smoking Status Former    Types: Cigarettes   Smokeless Tobacco Never     Social History     Substance and Sexual Activity   Alcohol Use None     Social History     Substance and Sexual Activity   Drug Use No        Family History   Problem Relation Age of Onset    Diabetes Mother     Heart Disease Mother     Other Mother         parkinson    Other Father         spherocytosis     Other Sister         spherocytosis     Other Brother        Review of Systems   Constitutional:  Negative for chills and fever.   Respiratory:  Negative for shortness of breath.    Cardiovascular:  Negative for  "chest pain.   Musculoskeletal:  Positive for joint pain. Negative for myalgias.   Neurological:  Negative for sensory change and weakness.        Objective:   /70 (BP Location: Right arm, Patient Position: Sitting, BP Cuff Size: Large adult)   Pulse 73   Temp 36.6 °C (97.9 °F) (Temporal)   Ht 1.562 m (5' 1.5\")   Wt 85.7 kg (189 lb)   LMP 11/29/2009   SpO2 97%   BMI 35.13 kg/m²     Physical Exam  Constitutional:       General: She is not in acute distress.     Appearance: Normal appearance. She is not ill-appearing.   HENT:      Head: Normocephalic and atraumatic.      Right Ear: External ear normal.      Left Ear: External ear normal.      Mouth/Throat:      Mouth: Mucous membranes are moist.   Eyes:      Extraocular Movements: Extraocular movements intact.      Conjunctiva/sclera: Conjunctivae normal.   Cardiovascular:      Rate and Rhythm: Normal rate.      Pulses: Normal pulses.   Pulmonary:      Effort: Pulmonary effort is normal.      Breath sounds: No wheezing.   Abdominal:      General: Abdomen is flat. There is no distension.   Musculoskeletal:      Cervical back: Normal range of motion and neck supple.      Comments: right knee: Overlying skin demonstrates no erythema, ecchymoses or wounds. Knee ROM is 3-110.  SILT spn, dpn, plantar and sural nerves. Motor intact to knee extension, ankle dorsiflexion, ankle plantar flexion, and eversion. DP/PT pulse 2+. There is tenderness at the medial joint line. There is not tenderness elsewhere. negative Lachman. stable varus/valgus stress.  There is varus deformity of the knee that is partially correctable.    Left knee:  Overlying skin demonstrates no erythema, ecchymoses or wounds. Knee ROM is 0-110.  SILT spn, dpn, plantar and sural nerves. Motor intact to knee extension, ankle dorsiflexion, ankle plantar flexion, and eversion. DP/PT pulse 2+. There is tenderness at the medial joint line. There is not tenderness elsewhere. negative Lachman. stable " "varus/valgus stress.  There is varus deformity of the knee that is partially correctable.       Skin:     General: Skin is warm and dry.      Capillary Refill: Capillary refill takes less than 2 seconds.   Neurological:      Mental Status: She is alert and oriented to person, place, and time.   Psychiatric:         Mood and Affect: Mood normal.         Behavior: Behavior normal.           Imaging:  Multiple views of the right knees and single Gomez view of the bilateral knees demonstrates severe degenerative changes including joint space narrowing, subchondral sclerosis, periarticular osteophytes, and subchondral cysts. There are no acute fractures or destructive osseous lesions. Joint alignment is varus.  The medial compartment is affected most bilaterally.  Bodies in the posterior aspect of the knee on the right side.        Diagnosis:     1. Primary osteoarthritis of left knee        2. Primary osteoarthritis of right knee                Assessment/Plan:     I counseled the patient on the above diagnosis.  She has bilateral knee osteoarthritis right greater than left and is most symptomatic on the right side.  I reviewed her radiographs with her in detail.We discussed conservative and surgical treatment options in detail. Conservative treatment options include weight loss, low impact exercise (pool, bike, etc), oral NSAIDs, corticosteroid injections, bracing, and ablative procedures of the sensory nerves to the joint. Surgical treatment includes total joint arthroplasty. They are not a candidate for partial knee arthroplasty given global involvement of arthritis on radiographs.    Initially she felt like she was to \"old,\" to have a knee replacement, but after our discussion she thinks she may wish to proceed with knee replacement.  She would like to talk to her daughter and come back in for another visit with her daughter present to revisit knee replacement.  I also gave her the website to sign up for the " online joints class for more information.  All of her questions were answered, she verbalized understanding and was in agreement with the plan.      Referrals: none  Restrictions: none  New medications/refills: none  Imaging studies: none  Follow-up: prn with daughter to discuss knee replacement       Danyelle Henry,   Orthopedic Oncology and General Orthopedics

## 2024-05-13 NOTE — TELEPHONE ENCOUNTER
DOCUMENTATION OF PAR STATUS:    1. Name of Medication & Dose:    ramelteon (ROZEREM) 8 MG tablet     2. Name of Prescription Coverage Company & phone #: [email or web address]    3. Date Prior Auth Submitted: 5/13/24    4. What information was given to obtain insurance decision? Chart notes, icd-10 code, med hx    5. Prior Auth Status? Pending    6. Patient Notified: N\A

## 2024-05-13 NOTE — PATIENT INSTRUCTIONS
Free - Total Joint Replacement Virtual Class      To Sign-up go to:    [email or web address]   Scroll down to classes and events   Select  one of the total joint replacement virtual classes   Then select register now

## 2024-05-14 DIAGNOSIS — G47.00 INSOMNIA, UNSPECIFIED TYPE: ICD-10-CM

## 2024-05-14 RX ORDER — GABAPENTIN 100 MG/1
100 CAPSULE ORAL
Qty: 30 CAPSULE | Refills: 1 | Status: SHIPPED | OUTPATIENT
Start: 2024-05-14

## 2024-05-14 ASSESSMENT — ENCOUNTER SYMPTOMS
WEAKNESS: 0
MYALGIAS: 0
CHILLS: 0
SHORTNESS OF BREATH: 0
SENSORY CHANGE: 0
FEVER: 0

## 2024-05-17 ENCOUNTER — TELEPHONE (OUTPATIENT)
Dept: MEDICAL GROUP | Facility: MEDICAL CENTER | Age: 64
End: 2024-05-17
Payer: COMMERCIAL

## 2024-05-17 NOTE — TELEPHONE ENCOUNTER
FINAL PRIOR AUTHORIZATION STATUS:    1.  Name of Medication & Dose:    ramelteon (ROZEREM) 8 MG tablet     2. Prior Auth Status: Denied.  Reason: scanned under  there not enough office notes that support this PA    3. Action Taken: Pharmacy Notified: N\A Patient Notified: yes

## 2024-05-21 RX ORDER — RAMELTEON 8 MG/1
8 TABLET ORAL EVERY EVENING
Qty: 30 TABLET | Refills: 6 | Status: SHIPPED
Start: 2024-05-21

## 2024-06-03 DIAGNOSIS — G47.09 OTHER INSOMNIA: ICD-10-CM

## 2024-07-15 ENCOUNTER — OFFICE VISIT (OUTPATIENT)
Dept: MEDICAL GROUP | Facility: MEDICAL CENTER | Age: 64
End: 2024-07-15
Attending: NURSE PRACTITIONER
Payer: COMMERCIAL

## 2024-07-15 VITALS
BODY MASS INDEX: 33.78 KG/M2 | HEART RATE: 64 BPM | DIASTOLIC BLOOD PRESSURE: 62 MMHG | SYSTOLIC BLOOD PRESSURE: 122 MMHG | WEIGHT: 181.7 LBS | OXYGEN SATURATION: 96 % | RESPIRATION RATE: 16 BRPM | TEMPERATURE: 96.7 F

## 2024-07-15 DIAGNOSIS — I10 ESSENTIAL HYPERTENSION: ICD-10-CM

## 2024-07-15 DIAGNOSIS — E11.9 TYPE 2 DIABETES MELLITUS WITHOUT COMPLICATION, UNSPECIFIED WHETHER LONG TERM INSULIN USE (HCC): ICD-10-CM

## 2024-07-15 LAB
HBA1C MFR BLD: 5.5 % (ref ?–5.8)
POCT INT CON NEG: NEGATIVE
POCT INT CON POS: POSITIVE

## 2024-07-15 PROCEDURE — 99214 OFFICE O/P EST MOD 30 MIN: CPT | Performed by: NURSE PRACTITIONER

## 2024-07-15 PROCEDURE — 3074F SYST BP LT 130 MM HG: CPT | Performed by: NURSE PRACTITIONER

## 2024-07-15 PROCEDURE — 99213 OFFICE O/P EST LOW 20 MIN: CPT | Performed by: NURSE PRACTITIONER

## 2024-07-15 PROCEDURE — 3078F DIAST BP <80 MM HG: CPT | Performed by: NURSE PRACTITIONER

## 2024-07-15 PROCEDURE — 83036 HEMOGLOBIN GLYCOSYLATED A1C: CPT | Performed by: NURSE PRACTITIONER

## 2024-07-15 RX ORDER — AMLODIPINE BESYLATE 10 MG/1
TABLET ORAL
Qty: 90 TABLET | Refills: 3 | Status: SHIPPED | OUTPATIENT
Start: 2024-07-15

## 2024-07-15 RX ORDER — PREDNISOLONE ACETATE 10 MG/ML
1 SUSPENSION/ DROPS OPHTHALMIC 4 TIMES DAILY
COMMUNITY
Start: 2024-06-21

## 2024-07-15 RX ORDER — VALSARTAN 160 MG/1
TABLET ORAL
Qty: 90 TABLET | Refills: 3 | Status: SHIPPED | OUTPATIENT
Start: 2024-07-15

## 2024-07-15 RX ORDER — LATANOPROST 50 UG/ML
1 SOLUTION/ DROPS OPHTHALMIC NIGHTLY
COMMUNITY
Start: 2024-06-27

## 2024-07-23 DIAGNOSIS — G47.00 INSOMNIA, UNSPECIFIED TYPE: ICD-10-CM

## 2024-07-23 DIAGNOSIS — G47.09 OTHER INSOMNIA: ICD-10-CM

## 2024-07-23 RX ORDER — HYDROXYZINE HYDROCHLORIDE 25 MG/1
25 TABLET, FILM COATED ORAL
Qty: 30 TABLET | Refills: 1 | Status: SHIPPED | OUTPATIENT
Start: 2024-07-23

## 2024-08-05 ENCOUNTER — PATIENT MESSAGE (OUTPATIENT)
Dept: HEALTH INFORMATION MANAGEMENT | Facility: OTHER | Age: 64
End: 2024-08-05

## 2024-08-09 ENCOUNTER — HOSPITAL ENCOUNTER (EMERGENCY)
Facility: MEDICAL CENTER | Age: 64
End: 2024-08-09
Attending: EMERGENCY MEDICINE
Payer: COMMERCIAL

## 2024-08-09 VITALS
BODY MASS INDEX: 32.94 KG/M2 | HEART RATE: 64 BPM | TEMPERATURE: 97.2 F | RESPIRATION RATE: 16 BRPM | HEIGHT: 62 IN | WEIGHT: 179.01 LBS | DIASTOLIC BLOOD PRESSURE: 98 MMHG | SYSTOLIC BLOOD PRESSURE: 127 MMHG | OXYGEN SATURATION: 96 %

## 2024-08-09 DIAGNOSIS — L30.9 DERMATITIS: ICD-10-CM

## 2024-08-09 PROCEDURE — 99282 EMERGENCY DEPT VISIT SF MDM: CPT

## 2024-08-09 RX ORDER — VALACYCLOVIR HYDROCHLORIDE 1 G/1
1000 TABLET, FILM COATED ORAL 3 TIMES DAILY
Qty: 21 TABLET | Refills: 0 | Status: ACTIVE | OUTPATIENT
Start: 2024-08-09 | End: 2024-08-16

## 2024-08-09 RX ORDER — CLOBETASOL PROPIONATE 0.5 MG/G
1 OINTMENT TOPICAL 2 TIMES DAILY
Qty: 30 G | Refills: 0 | Status: SHIPPED | OUTPATIENT
Start: 2024-08-09 | End: 2024-08-16

## 2024-08-09 ASSESSMENT — PAIN DESCRIPTION - PAIN TYPE: TYPE: ACUTE PAIN

## 2024-08-09 NOTE — ED TRIAGE NOTES
Giselayang Paulsonroza  64 y.o. female  Chief Complaint   Patient presents with    Rash     Onset yesterday. Pt has small dots on palm of hand, up arm, and down back. Pt reports it is not painful or itchy however feels it looks like shingles. Pt had chickenpox as a child.      BIB family from home for above. Pt ambulatory with steady gait to triage.    Pt is alert, oriented, and follows commands. Pt speaking in full sentences and responds appropriately to questions. No acute distress noted in triage. Respirations are even and unlabored.     Pt to lobby and educated on triage process. Pt encouraged to alert triage staff for any changes in condition. Pt signed up for messaging updates prior to leaving triage room.    Vitals:    08/09/24 1102   BP: 138/78   Pulse: 84   Resp: 16   Temp: 36.1 °C (96.9 °F)   SpO2: 99%

## 2024-08-09 NOTE — ED NOTES
Patient identity verified in lobby. Patient ambulated independently with steady gait to yellow 66.    This RN agrees with triage note. Patient connected to continuous monitor with call light and personal belongings within reach. Amandeep locked and in the lowest position.

## 2024-08-09 NOTE — ED PROVIDER NOTES
ED Provider Note    CHIEF COMPLAINT  Chief Complaint   Patient presents with    Rash     Onset yesterday. Pt has small dots on palm of hand, up arm, and down back. Pt reports it is not painful or itchy however feels it looks like shingles. Pt had chickenpox as a child.        EXTERNAL RECORDS REVIEWED  Other no recent pertinent past medical history on chart review    HPI/ROS  LIMITATION TO HISTORY   Select: : None  OUTSIDE HISTORIAN(S):  Family at bedside providing additional history    Gisela Eugene is a 64 y.o. female who presents to the emergency department for rash.  She explains that she noticed multiple lesions to the left arm and then and additional scattered lesions to her back as well as to her scalp.  Family did puma these yesterday however the lesion seems somewhat migratory.  No priors the same.  Concerned about possibility of zoster given childhood history of chickenpox.  States that the lesions are itchy.  No vesicles or pain.    PAST MEDICAL HISTORY   has a past medical history of Asthma, Diabetes (HCC), Hypertension, Spherocytosis (familial) (HCC), and Spherocytosis (familial) (HCC) (8/28/2018).    SURGICAL HISTORY   has a past surgical history that includes primary c section (1987); cholecystectomy (1995); splenectomy (1994); and tonsillectomy (1965).    FAMILY HISTORY  Family History   Problem Relation Age of Onset    Diabetes Mother     Heart Disease Mother     Other Mother         parkinson    Other Father         spherocytosis     Other Sister         spherocytosis     Other Brother        SOCIAL HISTORY  Social History     Tobacco Use    Smoking status: Former     Types: Cigarettes    Smokeless tobacco: Never   Substance and Sexual Activity    Alcohol use: Not on file    Drug use: No    Sexual activity: Not Currently     Partners: Male       CURRENT MEDICATIONS  Home Medications       Reviewed by Johanny Gonzalez R.N. (Registered Nurse) on 08/09/24 at 1124  Med List Status: Partial  "    Medication Last Dose Status   albuterol 108 (90 Base) MCG/ACT Aero Soln inhalation aerosol  Active   amLODIPine (NORVASC) 10 MG Tab  Active   gabapentin (NEURONTIN) 100 MG Cap  Active   hydrOXYzine HCl (ATARAX) 25 MG Tab  Active   latanoprost (XALATAN) 0.005 % Solution  Active   metformin (GLUCOPHAGE) 1000 MG tablet  Active   prednisoLONE acetate (PRED FORTE) 1 % Suspension  Active   Spacer/Aero-Holding Chambers (BREATHERITE HAROON SPACER ADULT) Misc  Active   valsartan (DIOVAN) 160 MG Tab  Active                    ALLERGIES  Allergies   Allergen Reactions    Voltaren [Diclofenac-Disod Edta] Itching       PHYSICAL EXAM  VITAL SIGNS: BP (!) 127/98   Pulse 64   Temp 36.2 °C (97.2 °F) (Temporal)   Resp 16   Ht 1.562 m (5' 1.5\")   Wt 81.2 kg (179 lb 0.2 oz)   LMP 11/29/2009   SpO2 96%   BMI 33.28 kg/m²          Pulse ox interpretation: I interpret this pulse ox as normal.  Constitutional: Alert in no apparent distress.  HENT: No signs of trauma, Bilateral external ears normal, Nose normal.   Eyes: Pupils are equal and reactive, Conjunctiva normal  Thorax & Lungs: No respiratory distress    Skin: Warm, Dry      Musculoskeletal: Good range of motion in all major joints. No tenderness to palpation or major deformities noted.   Neurologic: Alert , Normal motor function, Normal sensory function, No focal deficits noted.   Psychiatric: Affect normal, Judgment normal, Mood normal.         COURSE & MEDICAL DECISION MAKING    ASSESSMENT, COURSE AND PLAN  Care Narrative: 64-year-old presenting to the emergency department with rash.  High suspicion for possible contact dermatitis or insect envenomation however shingles remains a possibility given the linear arrangement especially on the arm however additional lesions to back and face would not be consistent with dermatomal distribution.  Will cover patient with steroids, antihistamines and initial course of antiviral should this develop in a more definitive " zoster    DISPOSITION AND DISCUSSIONS  I have discussed management of the patient with the following physicians and OLIVER's: None    Discussion of management with other [facility] or appropriate source(s): None     Escalation of care considered, and ultimately not performed:Laboratory analysis    Barriers to care at this time, including but not limited to:  None .     Decision tools and prescription drugs considered including, but not limited to: Medication modification medications provided as dictated above .    64-year-old senting with rash.  Please see course and plan as above.  No further need for acute emergent workup.  Patient does have close follow-up with outpatient PCP.  She is understanding return precautions here to the ER with any changes or worsening of symptoms.        FINAL DIAGNOSIS  1. Dermatitis         Electronically signed by: Geovany Amezcua M.D., 8/9/2024 12:15 PM

## 2024-08-09 NOTE — ED NOTES
Pt stable for discharge. Pt educated and reviewed discharge instructions with RN. Pt verbalized understanding & all questions were answered. Pt AoX 4. Pt ambulated independently with balanced and steady gait out of the ED with all belongings. Pt encouraged to come back if symptoms worsen.

## 2024-08-15 DIAGNOSIS — G47.09 OTHER INSOMNIA: ICD-10-CM

## 2024-08-15 DIAGNOSIS — G47.00 INSOMNIA, UNSPECIFIED TYPE: ICD-10-CM

## 2024-08-15 NOTE — TELEPHONE ENCOUNTER
Received request via: Pharmacy    Was the patient seen in the last year in this department? Yes    Does the patient have an active prescription (recently filled or refills available) for medication(s) requested? No    [facility]    Does the patient have [facility] and need 100-day supply? (This applies to ALL medications) Patient does not have SCP    Pharmacy requesting 90 days. DX code needed.    Future Appointments         Provider Department [place]    9/6/2024 10:20 AM (Arrive by 10:05 AM) MATEO Melchor. [facility]

## 2024-08-16 RX ORDER — HYDROXYZINE HYDROCHLORIDE 25 MG/1
25 TABLET, FILM COATED ORAL
Qty: 90 TABLET | Refills: 1 | Status: SHIPPED | OUTPATIENT
Start: 2024-08-16

## 2024-08-22 DIAGNOSIS — G47.00 INSOMNIA, UNSPECIFIED TYPE: ICD-10-CM

## 2024-08-23 RX ORDER — GABAPENTIN 100 MG/1
100 CAPSULE ORAL
Qty: 30 CAPSULE | Refills: 1 | Status: SHIPPED | OUTPATIENT
Start: 2024-08-23

## 2024-08-23 NOTE — TELEPHONE ENCOUNTER
Received request via: Pharmacy    Was the patient seen in the last year in this department? Yes    Does the patient have an active prescription (recently filled or refills available) for medication(s) requested? No    [facility]    Does the patient have [facility] and need 100-day supply? (This applies to ALL medications) Patient does not have SCP    Future Appointments         Provider Department [place]    9/6/2024 10:20 AM (Arrive by 10:05 AM) CYNTHIA Melchor [facility]

## 2024-08-26 ENCOUNTER — APPOINTMENT (OUTPATIENT)
Dept: MEDICAL GROUP | Facility: MEDICAL CENTER | Age: 64
End: 2024-08-26
Payer: COMMERCIAL

## 2024-09-03 ENCOUNTER — APPOINTMENT (OUTPATIENT)
Dept: MEDICAL GROUP | Facility: MEDICAL CENTER | Age: 64
End: 2024-09-03
Payer: COMMERCIAL

## 2024-10-21 ENCOUNTER — HOSPITAL ENCOUNTER (EMERGENCY)
Facility: MEDICAL CENTER | Age: 64
End: 2024-10-21
Attending: EMERGENCY MEDICINE
Payer: COMMERCIAL

## 2024-10-21 VITALS
OXYGEN SATURATION: 95 % | HEIGHT: 61 IN | DIASTOLIC BLOOD PRESSURE: 76 MMHG | TEMPERATURE: 98.7 F | WEIGHT: 173.5 LBS | BODY MASS INDEX: 32.76 KG/M2 | RESPIRATION RATE: 16 BRPM | HEART RATE: 59 BPM | SYSTOLIC BLOOD PRESSURE: 144 MMHG

## 2024-10-21 DIAGNOSIS — L29.9 PRURITUS: ICD-10-CM

## 2024-10-21 DIAGNOSIS — R21 RASH: ICD-10-CM

## 2024-10-21 PROCEDURE — 99282 EMERGENCY DEPT VISIT SF MDM: CPT

## 2024-10-21 RX ORDER — TRIAMCINOLONE ACETONIDE 1 MG/G
CREAM TOPICAL
Qty: 15 G | Refills: 2 | Status: SHIPPED | OUTPATIENT
Start: 2024-10-21 | End: 2024-10-30 | Stop reason: SDUPTHER

## 2024-10-30 ENCOUNTER — OFFICE VISIT (OUTPATIENT)
Dept: MEDICAL GROUP | Facility: MEDICAL CENTER | Age: 64
End: 2024-10-30
Attending: FAMILY MEDICINE
Payer: COMMERCIAL

## 2024-10-30 ENCOUNTER — HOSPITAL ENCOUNTER (OUTPATIENT)
Facility: MEDICAL CENTER | Age: 64
End: 2024-10-30
Attending: FAMILY MEDICINE
Payer: COMMERCIAL

## 2024-10-30 VITALS
DIASTOLIC BLOOD PRESSURE: 70 MMHG | OXYGEN SATURATION: 98 % | RESPIRATION RATE: 14 BRPM | SYSTOLIC BLOOD PRESSURE: 100 MMHG | HEART RATE: 70 BPM | HEIGHT: 61 IN | BODY MASS INDEX: 32.47 KG/M2 | WEIGHT: 172 LBS | TEMPERATURE: 97.9 F

## 2024-10-30 DIAGNOSIS — E11.9 TYPE 2 DIABETES MELLITUS WITHOUT COMPLICATION, UNSPECIFIED WHETHER LONG TERM INSULIN USE (HCC): ICD-10-CM

## 2024-10-30 DIAGNOSIS — R21 RASH: ICD-10-CM

## 2024-10-30 LAB — PATHOLOGY CONSULT NOTE: NORMAL

## 2024-10-30 PROCEDURE — 700101 HCHG RX REV CODE 250: Mod: UD

## 2024-10-30 PROCEDURE — 11104 PUNCH BX SKIN SINGLE LESION: CPT | Performed by: FAMILY MEDICINE

## 2024-10-30 PROCEDURE — 99214 OFFICE O/P EST MOD 30 MIN: CPT | Performed by: FAMILY MEDICINE

## 2024-10-30 RX ORDER — TRIAMCINOLONE ACETONIDE 1 MG/G
CREAM TOPICAL
Qty: 80 G | Refills: 1 | Status: SHIPPED | OUTPATIENT
Start: 2024-10-30

## 2024-10-30 RX ADMIN — LIDOCAINE HYDROCHLORIDE 10 ML: 10; .005 INJECTION, SOLUTION EPIDURAL; INFILTRATION; INTRACAUDAL; PERINEURAL at 16:52

## 2024-11-11 DIAGNOSIS — G47.00 INSOMNIA, UNSPECIFIED TYPE: ICD-10-CM

## 2024-11-11 DIAGNOSIS — G47.09 OTHER INSOMNIA: ICD-10-CM

## 2024-11-11 DIAGNOSIS — R21 RASH: ICD-10-CM

## 2024-11-11 RX ORDER — HYDROXYZINE HYDROCHLORIDE 50 MG/1
50 TABLET, FILM COATED ORAL
Qty: 30 TABLET | Refills: 2 | Status: SHIPPED | OUTPATIENT
Start: 2024-11-11

## 2024-11-11 RX ORDER — TRIAMCINOLONE ACETONIDE 1 MG/G
CREAM TOPICAL
Qty: 80 G | Refills: 1 | Status: SHIPPED | OUTPATIENT
Start: 2024-11-11

## 2024-11-21 DIAGNOSIS — R21 RASH: ICD-10-CM

## 2024-11-21 DIAGNOSIS — G47.00 INSOMNIA, UNSPECIFIED TYPE: ICD-10-CM

## 2024-11-21 DIAGNOSIS — G47.09 OTHER INSOMNIA: ICD-10-CM

## 2024-11-21 DIAGNOSIS — J45.30 MILD PERSISTENT ASTHMA, UNCOMPLICATED: ICD-10-CM

## 2024-11-22 RX ORDER — HYDROXYZINE HYDROCHLORIDE 50 MG/1
50 TABLET, FILM COATED ORAL
Qty: 30 TABLET | Refills: 2 | OUTPATIENT
Start: 2024-11-22

## 2024-11-22 RX ORDER — TRIAMCINOLONE ACETONIDE 1 MG/G
CREAM TOPICAL
Qty: 80 G | Refills: 1 | OUTPATIENT
Start: 2024-11-22

## 2024-11-22 RX ORDER — ALBUTEROL SULFATE 90 UG/1
INHALANT RESPIRATORY (INHALATION)
Qty: 1 EACH | Refills: 4 | Status: SHIPPED | OUTPATIENT
Start: 2024-11-22

## 2024-11-22 NOTE — TELEPHONE ENCOUNTER
Wants to see about getting 60 tablets of the Hydroxyzine, has been taking 2. Wants to know if the Triamcinolone is the big tube.    Received request via: Patient    Was the patient seen in the last year in this department? Yes    Does the patient have an active prescription (recently filled or refills available) for medication(s) requested? No    [facility]    Does the patient have [facility] and need 100-day supply? (This applies to ALL medications) Patient does not have [facility]    Future Appointments         Provider Department [place]    2/3/2025 1:20 PM (Arrive by 1:05 PM) CYNTHIA Melchor [facility]

## 2024-12-10 DIAGNOSIS — M17.11 PRIMARY OSTEOARTHRITIS OF RIGHT KNEE: ICD-10-CM

## 2024-12-31 DIAGNOSIS — Z12.11 COLON CANCER SCREENING: ICD-10-CM

## 2025-02-03 ENCOUNTER — OFFICE VISIT (OUTPATIENT)
Dept: MEDICAL GROUP | Facility: MEDICAL CENTER | Age: 65
End: 2025-02-03
Attending: NURSE PRACTITIONER
Payer: MEDICAID

## 2025-02-03 VITALS
HEIGHT: 62 IN | OXYGEN SATURATION: 94 % | WEIGHT: 176.8 LBS | DIASTOLIC BLOOD PRESSURE: 62 MMHG | BODY MASS INDEX: 32.54 KG/M2 | TEMPERATURE: 96.8 F | HEART RATE: 82 BPM | RESPIRATION RATE: 16 BRPM | SYSTOLIC BLOOD PRESSURE: 122 MMHG

## 2025-02-03 DIAGNOSIS — E11.9 TYPE 2 DIABETES MELLITUS WITHOUT COMPLICATION, WITHOUT LONG-TERM CURRENT USE OF INSULIN (HCC): ICD-10-CM

## 2025-02-03 LAB
HBA1C MFR BLD: 5.5 % (ref ?–5.8)
POCT INT CON NEG: NEGATIVE
POCT INT CON POS: POSITIVE

## 2025-02-03 PROCEDURE — 83036 HEMOGLOBIN GLYCOSYLATED A1C: CPT | Performed by: NURSE PRACTITIONER

## 2025-02-03 PROCEDURE — 99213 OFFICE O/P EST LOW 20 MIN: CPT | Performed by: NURSE PRACTITIONER

## 2025-02-03 PROCEDURE — 3074F SYST BP LT 130 MM HG: CPT | Performed by: NURSE PRACTITIONER

## 2025-02-03 PROCEDURE — 99214 OFFICE O/P EST MOD 30 MIN: CPT | Performed by: NURSE PRACTITIONER

## 2025-02-03 PROCEDURE — 3078F DIAST BP <80 MM HG: CPT | Performed by: NURSE PRACTITIONER

## 2025-02-03 RX ORDER — METFORMIN HYDROCHLORIDE 500 MG/1
500 TABLET, EXTENDED RELEASE ORAL DAILY
Qty: 90 TABLET | Refills: 1 | Status: SHIPPED | OUTPATIENT
Start: 2025-02-03 | End: 2025-05-04

## 2025-02-03 ASSESSMENT — PATIENT HEALTH QUESTIONNAIRE - PHQ9: CLINICAL INTERPRETATION OF PHQ2 SCORE: 0

## 2025-02-05 NOTE — PROGRESS NOTES
Verbal consent was acquired by the patient to use [employer] ambient listening note generation during this visit     Chief Complaint   Patient presents with    Follow-Up       Subjective:     HPI:   History of Present Illness  The patient presents for evaluation of diabetes mellitus, urinary incontinence, and weight management.    She has been managing her diabetes with metformin for the past 3 months. She expresses a desire to discontinue her diabetic medication due to its prolonged use.    She reports a history of urinary incontinence. She has consulted with Dr. Rico from [facility], who has proposed a treatment plan involving the placement of electrodes on her back for a duration of 3 days to assess any potential changes. This will be followed by the surgical implantation of a battery, which is expected to last for 10 years. However, this procedure is not covered by Medicaid, but it is covered by Medicare, and she will be eligible for Medicare in 3 weeks. The doctor will do all the pulmonary workup now and will perform the surgery in March when she gets Medicare.    She has achieved a significant weight loss of 75 pounds and aims to lose additional weight. She is considering joining an exercise program at a gym, specifically one that can accommodate her knee condition. She does not currently have plans for knee replacement surgery.    She had a colonoscopy scheduled for 01/03/2025, but it was canceled due to a change in her insurance. She received a referral promptly and contacted the provider, who initially indicated a wait time of 1.5 to 2 months. However, they managed to fit her in the following Friday.    She has a cataract and glaucoma in her right eye, but she sees 20/20 out of her right eye. She might get a provisional 's license, but everybody in her family is against that.    FAMILY HISTORY  Her kids have glaucoma.    MEDICATIONS  Metformin        No problems updated.    ROS  See HPI      Allergies   Allergen Reactions    Voltaren [Diclofenac-Disod Edta] Itching       Current medicines (including changes today)  Current Outpatient Medications   Medication Sig Dispense Refill    metFORMIN ER (GLUCOPHAGE XR) 500 MG TABLET SR 24 HR Take 1 Tablet by mouth every day for 90 days. 90 Tablet 1    albuterol 108 (90 Base) MCG/ACT Aero Soln inhalation aerosol INHALE 2 PUFFS ORALLY EVERY 6 HOURS IF NEEDED FOR SHORTNESS OF BREATH 1 Each 4    hydrOXYzine HCl (ATARAX) 50 MG Tab Take 1 Tablet by mouth at bedtime as needed for Anxiety (sleep). 30 Tablet 2    triamcinolone acetonide (KENALOG) 0.1 % Cream Apply to itching skin lesion once a day as needed for redness and swelling 80 g 1    latanoprost (XALATAN) 0.005 % Solution Administer 1 Drop into both eyes every evening.      amLODIPine (NORVASC) 10 MG Tab TAKE 1 TABLET ORALLY ONCE DAILY 90 Tablet 3    valsartan (DIOVAN) 160 MG Tab TAKE 1 TABLET ORALLY ONCE DAILY 90 Tablet 3     No current facility-administered medications for this visit.       Social History     Tobacco Use    Smoking status: Former     Types: Cigarettes    Smokeless tobacco: Never   Vaping Use    Vaping status: Never Used   Substance Use Topics    Alcohol use: Not Currently    Drug use: No       Patient Active Problem List    Diagnosis Date Noted    Acquired asplenia 04/05/2019    Trigeminal neuralgia 02/06/2019    Spherocytosis (familial) ([facility]) 08/28/2018    Hot flashes 06/05/2018    Thrombocytosis 05/16/2018    Stress incontinence of urine 11/29/2017    Fecal occult blood test positive 10/04/2017    Environmental allergies 03/30/2017    Morbid obesity with BMI of 45.0-49.9, adult ([facility]) 02/16/2017    Insomnia 07/07/2016    Hyperlipidemia 06/09/2016    Osteoarthritis of knee 04/14/2016    Vision problems 03/24/2016    Right knee pain 03/24/2016    Vitamin D deficiency 11/12/2015    Type 2 diabetes mellitus without complication ([facility]) 11/12/2015    Essential hypertension 10/22/2015    Moderate  "persistent asthma 10/22/2015    Episodic headache 10/22/2015    H/O splenectomy 10/22/2015       Family History   Problem Relation Age of Onset    Diabetes Mother     Heart Disease Mother     Other Mother         parkinson    Other Father         spherocytosis     Other Sister         spherocytosis     Other Brother           Objective:     /62 (BP Location: Left arm, Patient Position: Sitting, BP Cuff Size: Adult)   Pulse 82   Temp 36 °C (96.8 °F) (Temporal)   Resp 16   Ht 1.562 m (5' 1.5\")   Wt 80.2 kg (176 lb 12.8 oz)   SpO2 94%  Body mass index is 32.87 kg/m².    Physical Exam:  Physical Exam  Vitals reviewed.   Constitutional:       General: She is awake.      Appearance: Normal appearance. She is well-developed.   HENT:      Head: Normocephalic.   Eyes:      Conjunctiva/sclera: Conjunctivae normal.   Cardiovascular:      Rate and Rhythm: Normal rate.   Pulmonary:      Effort: Pulmonary effort is normal. No respiratory distress.   Musculoskeletal:      Cervical back: Neck supple.   Skin:     General: Skin is warm and dry.   Neurological:      Mental Status: She is alert and oriented to person, place, and time.   Psychiatric:         Mood and Affect: Mood normal.         Behavior: Behavior normal. Behavior is cooperative.              Assessment and Plan:     The following treatment plan was discussed:    Problem List Items Addressed This Visit       Type 2 diabetes mellitus without complication (HCC)    Relevant Medications    metFORMIN ER (GLUCOPHAGE XR) 500 MG TABLET SR 24 HR    Other Relevant Orders    POCT Hemoglobin A1C (Completed)       Assessment & Plan  1. Diabetes Mellitus.  Her A1c level remains stable at 5.5. She will continue with metformin  at 500 mg once daily instead of 1000mg. A prescription for metformin 500 mg will be sent to [facility]. If her blood glucose levels remain stable, we will consider further reducing the dosage.    2. Urinary Incontinence.  She has been experiencing a leaky " bladder problem and has consulted with Dr. Rico. A temporary device will be placed to monitor the effectiveness before proceeding with a permanent implant. The procedure will be covered by Medicare starting in March.    3. Weight Management.  She has lost 75 pounds and aims to lose more weight. She is advised to continue her current weight loss efforts and consider joining an exercise program that accommodates her knee condition.    4. Health Maintenance.  She underwent a colonoscopy on 01/03/2025, during which small polyps were removed. She is scheduled for a follow-up colonoscopy in 3 years.    5. Cataract and glaucoma.  She has a cataract and glaucoma in her right eye, but she sees 20/20 out of her right eye.    Follow-up  The patient will follow up in 3 months.    PROCEDURE  Colonoscopy on 01/03/2025 revealed small polyps, which were removed.    Any change or worsening of signs or symptoms, patient encouraged to follow-up or report to emergency room for further evaluation. Patient verbalizes understanding and agrees.      PLEASE NOTE: This dictation was created using voice recognition software. I have made every reasonable attempt to correct obvious errors, but I expect that there are errors of grammar and possibly content that I did not discover before finalizing the note.

## 2025-02-18 DIAGNOSIS — R21 RASH: ICD-10-CM

## 2025-02-21 DIAGNOSIS — R32 URINARY INCONTINENCE, UNSPECIFIED TYPE: ICD-10-CM

## 2025-02-21 RX ORDER — TRIAMCINOLONE ACETONIDE 1 MG/G
CREAM TOPICAL
Qty: 80 G | Refills: 1 | Status: SHIPPED | OUTPATIENT
Start: 2025-02-21

## 2025-02-21 NOTE — TELEPHONE ENCOUNTER
Received request via: Pharmacy    Was the patient seen in the last year in this department? Yes    Does the patient have an active prescription (recently filled or refills available) for medication(s) requested? No    [facility]    Does the patient have [facility] and need 100-day supply? (This applies to ALL medications) Patient does not have SCP

## 2025-02-25 NOTE — Clinical Note
REFERRAL APPROVAL NOTICE         Sent on February 25, 2025                   Natalie Collier Jabier  [address] [place] [address]                   Dear Ms. Eugene,    After a careful review of the medical information and benefit coverage, [facility] has processed your referral. See below for additional details.    If applicable, you must be actively enrolled with your insurance for coverage of the authorized service. If you have any questions regarding your coverage, please contact your insurance directly.    REFERRAL INFORMATION   Referral #:  [id number]  Referred-To Provider    Referred-By Provider:  Physical Therapy    CYNTHIA Melchor   [facility]      [address]  [place] [address]  [phone] [address]  [place] [address]  [phone]    Referral Start Date:  02/21/2025  Referral End Date:   02/21/2026             SCHEDULING  If you do not already have an appointment, please call [phone] to make an appointment.     MORE INFORMATION  If you do not already have a [employer] account, sign up at: [email or web address]  You can access your medical information, make appointments, see lab results, billing information, and more.  If you have questions regarding this referral, please contact  the [facility] Referrals department at:             [phone]. Monday - Friday 8:00AM - 5:00PM.     Sincerely,    [facility]

## 2025-02-28 DIAGNOSIS — G47.00 INSOMNIA, UNSPECIFIED TYPE: ICD-10-CM

## 2025-02-28 DIAGNOSIS — G47.09 OTHER INSOMNIA: ICD-10-CM

## 2025-02-28 RX ORDER — HYDROXYZINE HYDROCHLORIDE 50 MG/1
50 TABLET, FILM COATED ORAL
Qty: 90 TABLET | Refills: 1 | Status: SHIPPED | OUTPATIENT
Start: 2025-02-28

## 2025-04-27 DIAGNOSIS — I10 ESSENTIAL HYPERTENSION: ICD-10-CM

## 2025-04-27 DIAGNOSIS — E11.9 TYPE 2 DIABETES MELLITUS WITHOUT COMPLICATION, WITHOUT LONG-TERM CURRENT USE OF INSULIN (HCC): ICD-10-CM

## 2025-04-28 RX ORDER — METFORMIN HYDROCHLORIDE 500 MG/1
500 TABLET, EXTENDED RELEASE ORAL
Qty: 90 TABLET | Refills: 1 | Status: SHIPPED | OUTPATIENT
Start: 2025-04-28

## 2025-04-28 RX ORDER — AMLODIPINE BESYLATE 10 MG/1
10 TABLET ORAL
Qty: 90 TABLET | Refills: 3 | Status: SHIPPED | OUTPATIENT
Start: 2025-04-28

## 2025-05-05 ENCOUNTER — OFFICE VISIT (OUTPATIENT)
Dept: MEDICAL GROUP | Facility: MEDICAL CENTER | Age: 65
End: 2025-05-05
Attending: NURSE PRACTITIONER
Payer: MEDICARE

## 2025-05-05 VITALS
WEIGHT: 186.6 LBS | DIASTOLIC BLOOD PRESSURE: 60 MMHG | OXYGEN SATURATION: 93 % | TEMPERATURE: 96.6 F | BODY MASS INDEX: 34.69 KG/M2 | SYSTOLIC BLOOD PRESSURE: 120 MMHG | RESPIRATION RATE: 16 BRPM | HEART RATE: 76 BPM

## 2025-05-05 DIAGNOSIS — R21 RASH: ICD-10-CM

## 2025-05-05 DIAGNOSIS — E11.9 TYPE 2 DIABETES MELLITUS WITHOUT COMPLICATION, WITHOUT LONG-TERM CURRENT USE OF INSULIN (HCC): ICD-10-CM

## 2025-05-05 DIAGNOSIS — J45.30 MILD PERSISTENT ASTHMA, UNCOMPLICATED: ICD-10-CM

## 2025-05-05 DIAGNOSIS — G47.09 OTHER INSOMNIA: ICD-10-CM

## 2025-05-05 DIAGNOSIS — Z13.6 SCREENING FOR CARDIOVASCULAR CONDITION: ICD-10-CM

## 2025-05-05 DIAGNOSIS — E55.9 VITAMIN D DEFICIENCY: ICD-10-CM

## 2025-05-05 DIAGNOSIS — R63.5 WEIGHT GAIN: ICD-10-CM

## 2025-05-05 DIAGNOSIS — G47.00 INSOMNIA, UNSPECIFIED TYPE: ICD-10-CM

## 2025-05-05 LAB
HBA1C MFR BLD: 5.8 % (ref ?–5.8)
POCT INT CON NEG: NEGATIVE
POCT INT CON POS: POSITIVE

## 2025-05-05 PROCEDURE — 3078F DIAST BP <80 MM HG: CPT | Performed by: NURSE PRACTITIONER

## 2025-05-05 PROCEDURE — 83036 HEMOGLOBIN GLYCOSYLATED A1C: CPT | Performed by: NURSE PRACTITIONER

## 2025-05-05 PROCEDURE — 99213 OFFICE O/P EST LOW 20 MIN: CPT | Performed by: NURSE PRACTITIONER

## 2025-05-05 PROCEDURE — 3074F SYST BP LT 130 MM HG: CPT | Performed by: NURSE PRACTITIONER

## 2025-05-05 PROCEDURE — 99214 OFFICE O/P EST MOD 30 MIN: CPT | Performed by: NURSE PRACTITIONER

## 2025-05-06 NOTE — TELEPHONE ENCOUNTER
Detail Level: Detailed Received request via: Pharmacy    Was the patient seen in the last year in this department? Yes    Does the patient have an active prescription (recently filled or refills available) for medication(s) requested? No    [facility]    Does the patient have [facility] Plus and need 100-day supply? (This applies to ALL medications) Patient does not have SCP

## 2025-05-07 RX ORDER — ALBUTEROL SULFATE 90 UG/1
INHALANT RESPIRATORY (INHALATION)
Qty: 6.7 EACH | Refills: 4 | Status: SHIPPED | OUTPATIENT
Start: 2025-05-07

## 2025-05-07 RX ORDER — TRIAMCINOLONE ACETONIDE 1 MG/G
CREAM TOPICAL
Qty: 80 G | Refills: 1 | Status: SHIPPED | OUTPATIENT
Start: 2025-05-07

## 2025-05-07 RX ORDER — HYDROXYZINE HYDROCHLORIDE 25 MG/1
25 TABLET, FILM COATED ORAL
Qty: 30 TABLET | Refills: 5 | OUTPATIENT
Start: 2025-05-07

## 2025-05-14 NOTE — PROGRESS NOTES
Verbal consent was acquired by the patient to use [employer] ambient listening note generation during this visit     Chief Complaint   Patient presents with    Follow-Up    Weight Check       Subjective:     HPI:   History of Present Illness  The patient presents for evaluation of diabetes and weight management.    She has expressed interest in exploring oral medications for weight loss, specifically seeking guidance on over-the-counter options. She has experienced a weight gain of 10 pounds, which she attributes to dietary indiscretions. Her current diet includes yogurt, cottage cheese, salads, fruits, oatmeal, and Cheerios. She acknowledges the need to increase her water intake. Her beverage consumption includes iced tea and Diet Coke, the latter of which she does not consume daily. She reports no history of smoking or regular alcohol consumption, although she occasionally indulges in a glass of wine during social outings. Her recent A1c was 5.8, and she is currently on a regimen of metformin 500 mg, having previously been on a higher dose of 1000 mg. She is considering discontinuing metformin due to her well-controlled A1c.    She is scheduled for cataract surgery on her left eye next Tuesday and will be undergoing three treatments for glaucoma. Additionally, she is planning to start online physical therapy, as Medicare will cover it, unlike Medicaid. She takes hydroxyzine to sleep.    SOCIAL HISTORY  She does not smoke. She does not drink alcohol regularly but may have a glass of wine occasionally when going out.        No problems updated.    ROS  See HPI     Allergies[1]    Current medicines (including changes today)  Current Medications[2]    Social History[3]    Patient Active Problem List    Diagnosis Date Noted    Acquired asplenia 04/05/2019    Trigeminal neuralgia 02/06/2019    Spherocytosis (familial) ([facility]) 08/28/2018    Hot flashes 06/05/2018    Thrombocytosis 05/16/2018    Stress incontinence of  urine 11/29/2017    Fecal occult blood test positive 10/04/2017    Environmental allergies 03/30/2017    Morbid obesity with BMI of 45.0-49.9, adult ([facility]) 02/16/2017    Insomnia 07/07/2016    Hyperlipidemia 06/09/2016    Osteoarthritis of knee 04/14/2016    Vision problems 03/24/2016    Right knee pain 03/24/2016    Vitamin D deficiency 11/12/2015    Type 2 diabetes mellitus without complication ([facility]) 11/12/2015    Essential hypertension 10/22/2015    Moderate persistent asthma 10/22/2015    Episodic headache 10/22/2015    H/O splenectomy 10/22/2015       Family History   Problem Relation Age of Onset    Diabetes Mother     Heart Disease Mother     Other Mother         parkinson    Other Father         spherocytosis     Other Sister         spherocytosis     Other Brother           Objective:     /60 (BP Location: Left arm, Patient Position: Sitting, BP Cuff Size: Adult)   Pulse 76   Temp 35.9 °C (96.6 °F) (Temporal)   Resp 16   Wt 84.6 kg (186 lb 9.6 oz)   SpO2 93%  Body mass index is 34.69 kg/m².    Physical Exam:  Physical Exam  Vitals reviewed.   Constitutional:       General: She is awake.      Appearance: Normal appearance. She is well-developed.   HENT:      Head: Normocephalic.   Eyes:      Conjunctiva/sclera: Conjunctivae normal.   Cardiovascular:      Rate and Rhythm: Normal rate.   Pulmonary:      Effort: Pulmonary effort is normal. No respiratory distress.   Musculoskeletal:      Cervical back: Neck supple.   Skin:     General: Skin is warm and dry.   Neurological:      Mental Status: She is alert and oriented to person, place, and time.   Psychiatric:         Mood and Affect: Mood normal.         Behavior: Behavior normal. Behavior is cooperative.              Assessment and Plan:     The following treatment plan was discussed:    Problem List Items Addressed This Visit       Vitamin D deficiency    Relevant Orders    VITAMIN D,25 HYDROXY (DEFICIENCY)    Type 2 diabetes mellitus without  complication (HCC)    Relevant Orders    POCT Hemoglobin A1C (Completed)    Lipid Profile    Comp Metabolic Panel    MICROALBUMIN CREAT RATIO URINE     Other Visit Diagnoses         Screening for cardiovascular condition          Weight gain        Relevant Orders    TSH    FREE THYROXINE            Assessment & Plan  1. Diabetes mellitus.  - A1c level is well-controlled at 5.8.  - Discontinuation of metformin 500 mg and attempt to manage condition through dietary modifications.  - Advised to retain current supply of metformin for the next 3 months to ensure A1c levels remain stable.  - If A1c levels remain satisfactory after this period, medication can be returned to the pharmacy for destruction.    2. Weight management.  - Gained 10 pounds and interested in weight loss options.  - Over-the-counter Manuel (orlistat) recommended as a safe option.  - Potential side effects, including frequent bowel movements, discussed.  - Advised to purchase from pharmacy or retailer.    3. Health maintenance.  - Due for annual laboratory tests, including cholesterol levels.  - Instructed to fast before the test.  - Map to the lab will be provided.    Follow-up  - Follow up in 3 months.    Any change or worsening of signs or symptoms, patient encouraged to follow-up or report to emergency room for further evaluation. Patient verbalizes understanding and agrees.      PLEASE NOTE: This dictation was created using voice recognition software. I have made every reasonable attempt to correct obvious errors, but I expect that there are errors of grammar and possibly content that I did not discover before finalizing the note.       [1]   Allergies  Allergen Reactions    Voltaren [Diclofenac-Disod Edta] Itching   [2]   Current Outpatient Medications   Medication Sig Dispense Refill    albuterol 108 (90 Base) MCG/ACT Aero Soln inhalation aerosol INHALE 2 PUFFS ORALLY EVERY 6 HOURS IF NEEDED FOR SHORTNESS OF BREATH 6.7 Each 4    triamcinolone  acetonide (KENALOG) 0.1 % Cream APPLY TO ITCHING SKIN LESION ONCE A DAY AS NEEDED FOR REDNESS AND SWELLING 80 g 1    amLODIPine (NORVASC) 10 MG Tab TAKE 1 TABLET BY MOUTH EVERY DAY 90 Tablet 3    hydrOXYzine HCl (ATARAX) 50 MG Tab TAKE 1 TABLET BY MOUTH AT BEDTIME AS NEEDED FOR ANXIETY (SLEEP). 90 Tablet 1    latanoprost (XALATAN) 0.005 % Solution Administer 1 Drop into both eyes every evening.      valsartan (DIOVAN) 160 MG Tab TAKE 1 TABLET ORALLY ONCE DAILY 90 Tablet 3     No current facility-administered medications for this visit.   [3]   Social History  Tobacco Use    Smoking status: Former     Types: Cigarettes    Smokeless tobacco: Never   Vaping Use    Vaping status: Never Used   Substance Use Topics    Alcohol use: Not Currently     Comment: ocass    Drug use: No

## 2025-05-16 DIAGNOSIS — M25.561 CHRONIC PAIN OF RIGHT KNEE: Primary | ICD-10-CM

## 2025-05-16 DIAGNOSIS — G89.29 CHRONIC PAIN OF RIGHT KNEE: Primary | ICD-10-CM

## 2025-06-09 DIAGNOSIS — G47.00 INSOMNIA, UNSPECIFIED TYPE: ICD-10-CM

## 2025-06-09 DIAGNOSIS — G47.09 OTHER INSOMNIA: ICD-10-CM

## 2025-06-09 RX ORDER — HYDROXYZINE HYDROCHLORIDE 50 MG/1
50 TABLET, FILM COATED ORAL
Qty: 90 TABLET | Refills: 1 | Status: SHIPPED | OUTPATIENT
Start: 2025-06-09

## 2025-06-09 NOTE — Clinical Note
REFERRAL APPROVAL NOTICE         Sent on June 9, 2025                   Natalie Collier Eugene  [address] [place] [address]                   Dear MsJak Jabier,    After a careful review of the medical information and benefit coverage, [facility] has processed your referral. See below for additional details.    If applicable, you must be actively enrolled with your insurance for coverage of the authorized service. If you have any questions regarding your coverage, please contact your insurance directly.    REFERRAL INFORMATION   Referral #:  [id number]  Referred-To Department    Referred-By Provider:  Orthopedics    CYNTHIA Melchor   Department Of Surgery      [address]  [place] [address]  [phone] [address], [address]  [place] [id number] [phone]    Referral Start Date:  05/16/2025  Referral End Date:   05/16/2026             SCHEDULING  If you do not already have an appointment, please call [phone] to make an appointment.     MORE INFORMATION  If you do not already have a [employer] account, sign up at: [email or web address]  You can access your medical information, make appointments, see lab results, billing information, and more.  If you have questions regarding this referral, please contact  the [facility] Referrals department at:             [phone]. Monday - Friday 8:00AM - 5:00PM.     Sincerely,    [facility]

## 2025-06-11 DIAGNOSIS — M17.11 PRIMARY OSTEOARTHRITIS OF RIGHT KNEE: ICD-10-CM

## 2025-06-11 DIAGNOSIS — G89.29 CHRONIC PAIN OF RIGHT KNEE: Primary | ICD-10-CM

## 2025-06-11 DIAGNOSIS — M25.561 CHRONIC PAIN OF RIGHT KNEE: Primary | ICD-10-CM

## 2025-06-18 NOTE — Clinical Note
REFERRAL APPROVAL NOTICE         Sent on June 18, 2025                   Natalie Collier Eugene  [address] [place] [address]                   Dear Ms. Eugene,    After a careful review of the medical information and benefit coverage, [facility] has processed your referral. See below for additional details.    If applicable, you must be actively enrolled with your insurance for coverage of the authorized service. If you have any questions regarding your coverage, please contact your insurance directly.    REFERRAL INFORMATION   Referral #:  [id number]  Referred-To Provider    Referred-By Provider:  Physical Therapy    CYNTHIA Melchor   [facility]      [address]  [place] [address]  [phone] [address]  [facility] [address]  [phone]    Referral Start Date:  06/11/2025  Referral End Date:   06/11/2026             SCHEDULING  If you do not already have an appointment, please call [phone] to make an appointment.     MORE INFORMATION  If you do not already have a uberall account, sign up at: [email or web address]  You can access your medical information, make appointments, see lab results, billing information, and more.  If you have questions regarding this referral, please contact  the [facility] Referrals department at:             [phone]. Monday - Friday 8:00AM - 5:00PM.     Sincerely,    [facility]

## 2025-07-07 ENCOUNTER — OFFICE VISIT (OUTPATIENT)
Facility: MEDICAL CENTER | Age: 65
End: 2025-07-07
Payer: MEDICARE

## 2025-07-07 VITALS
DIASTOLIC BLOOD PRESSURE: 72 MMHG | BODY MASS INDEX: 36.43 KG/M2 | WEIGHT: 192.97 LBS | HEART RATE: 79 BPM | HEIGHT: 61 IN | TEMPERATURE: 97.6 F | OXYGEN SATURATION: 93 % | SYSTOLIC BLOOD PRESSURE: 126 MMHG

## 2025-07-07 DIAGNOSIS — M17.11 PRIMARY OSTEOARTHRITIS OF RIGHT KNEE: Primary | ICD-10-CM

## 2025-07-07 DIAGNOSIS — M17.12 PRIMARY OSTEOARTHRITIS OF LEFT KNEE: ICD-10-CM

## 2025-07-07 PROCEDURE — 3078F DIAST BP <80 MM HG: CPT | Performed by: ORTHOPAEDIC SURGERY

## 2025-07-07 PROCEDURE — 20610 DRAIN/INJ JOINT/BURSA W/O US: CPT | Mod: 50 | Performed by: ORTHOPAEDIC SURGERY

## 2025-07-07 PROCEDURE — 3074F SYST BP LT 130 MM HG: CPT | Performed by: ORTHOPAEDIC SURGERY

## 2025-07-07 RX ORDER — TRIAMCINOLONE ACETONIDE 40 MG/ML
80 INJECTION, SUSPENSION INTRA-ARTICULAR; INTRAMUSCULAR ONCE
Status: COMPLETED | OUTPATIENT
Start: 2025-07-07 | End: 2025-07-07

## 2025-07-07 RX ADMIN — Medication 8 ML: at 11:41

## 2025-07-07 RX ADMIN — TRIAMCINOLONE ACETONIDE 80 MG: 40 INJECTION, SUSPENSION INTRA-ARTICULAR; INTRAMUSCULAR at 11:42

## 2025-07-07 ASSESSMENT — ENCOUNTER SYMPTOMS
SENSORY CHANGE: 0
TINGLING: 0
WEAKNESS: 0

## 2025-07-07 NOTE — PROGRESS NOTES
[facility] Orthopedic Surgery      HPI: Natalie returns for follow up of her knee arthritis. Since I last saw her she reports her knees have continued to be painful. She wants to lose more weight prior to considering surgery. She denies any new numbness, paresthesias or medical problems. She is no longer taking diabetic medication and is controlling it with diet.     Past Medical History:   Past Medical History[1]    Past Surgical History:  Past Surgical History[2]    Encounter Medications[3]     Allergies:   Allergies[4]    Family History:   Family History   Problem Relation Age of Onset    Diabetes Mother     Heart Disease Mother     Other Mother         parkinson    Other Father         spherocytosis     Other Sister         spherocytosis     Other Brother        Social History:   Tobacco Use History[5]  Social History     Substance and Sexual Activity   Alcohol Use Not Currently    Comment: ocass     Social History     Substance and Sexual Activity   Drug Use No     Social History     Socioeconomic History    Marital status: Single     Spouse name: Not on file    Number of children: Not on file    Years of education: Not on file    Highest education level: Not on file   Occupational History     Employer: OTHER   Tobacco Use    Smoking status: Former     Types: Cigarettes    Smokeless tobacco: Never   Vaping Use    Vaping status: Never Used   Substance and Sexual Activity    Alcohol use: Not Currently     Comment: ocass    Drug use: No    Sexual activity: Not Currently     Partners: Male   Other Topics Concern    Not on file   Social History Narrative    Not on file     Social Drivers of Health     Financial Resource Strain: Not on file   Food Insecurity: Not on file   Transportation Needs: Not on file   Physical Activity: Not on file   Stress: Not on file   Social Connections: Not on file   Intimate Partner Violence: Not on file   Housing Stability: Not on file       Review of Systems:  Review of Systems  "  Musculoskeletal:  Positive for joint pain.   Neurological:  Negative for tingling, sensory change and weakness.       Physical Exam:  /72 (BP Location: Right arm, Patient Position: Sitting, BP Cuff Size: Adult)   Pulse 79   Temp 36.4 °C (97.6 °F) (Temporal)   Ht 1.549 m (5' 1\")   Wt 87.5 kg (192 lb 15.5 oz)   LMP 11/29/2009   SpO2 93%   BMI 36.46 kg/m²     Physical Exam    bilateral knee: Overlying skin demonstrates no erythema, ecchymoses or wounds. Knee ROM is ~0-120.  SILT spn, dpn, plantar and sural nerves. Motor intact to knee extension, ankle dorsiflexion, ankle plantar flexion, and eversion. DP/PT pulse 2+. There is tenderness at the medial and lateral joint line.negative Lachman. stable varus/valgus stress. Partially correctable varus deformity.         Assessment and Plan:  Bilateral knee osteoarthritis    The patient is requesting corticosteroid injections today. She would like to continue working on weight loss prior to considering joint arthroplasty. I think that is reasonable, she will follow up in 3 months. See separate procedure note.     All questions were answered and they were in agreement with the plan.     Referrals: none  Restrictions: none  New medications/refills: none  Imaging studies: none  Follow-up: three months    Danyelle Henry DO  Orthopedics and Orthopedic Oncology          [1]   Past Medical History:  Diagnosis Date    Asthma     Diabetes (HCC)     Hypertension     Spherocytosis (familial) (HCC)     Spherocytosis (familial) (HCC) 8/28/2018   [2]   Past Surgical History:  Procedure Laterality Date    CHOLECYSTECTOMY  1995    SPLENECTOMY  1994    PRIMARY C SECTION  1987    TONSILLECTOMY  1965   [3]   Outpatient Encounter Medications as of 7/7/2025   Medication Sig Dispense Refill    hydrOXYzine HCl (ATARAX) 50 MG Tab Take 1 Tablet by mouth at bedtime as needed for Anxiety (sleep). 90 Tablet 1    albuterol 108 (90 Base) MCG/ACT Aero Soln inhalation aerosol INHALE 2 PUFFS " ORALLY EVERY 6 HOURS IF NEEDED FOR SHORTNESS OF BREATH 6.7 Each 4    triamcinolone acetonide (KENALOG) 0.1 % Cream APPLY TO ITCHING SKIN LESION ONCE A DAY AS NEEDED FOR REDNESS AND SWELLING 80 g 1    amLODIPine (NORVASC) 10 MG Tab TAKE 1 TABLET BY MOUTH EVERY DAY 90 Tablet 3    latanoprost (XALATAN) 0.005 % Solution Administer 1 Drop into both eyes every evening.      valsartan (DIOVAN) 160 MG Tab TAKE 1 TABLET ORALLY ONCE DAILY 90 Tablet 3     Facility-Administered Encounter Medications as of 7/7/2025   Medication Dose Route Frequency Provider Last Rate Last Admin    triamcinolone acetonide (Kenalog-40) injection 80 mg  80 mg Intra-articular Once         lidocaine (Xylocaine) 1 % injection 8 mL  8 mL Injection Once        [4]   Allergies  Allergen Reactions    Voltaren [Diclofenac-Disod Edta] Itching   [5]   Social History  Tobacco Use   Smoking Status Former    Types: Cigarettes   Smokeless Tobacco Never

## 2025-07-07 NOTE — PROCEDURES
Procedure: Intra-articular corticosteroid injection bilateral knee    Diagnosis: Bilateral knee osteoarthritis    Consent: Verbal consent obtained from patient. We discussed risks of infection, blood loss, damage to nerves/vessels in the area, and pain.     Performed by: Danyelle Henry DO    Procedure details:  A time out was held to confirm the site and patient. The right knee was sterilized with chloraprep swabs. Then a mixture of 4 cc of 1% lidocaine and 1 cc of 40 mg/ml of kenalog was injected into the knee through the anterolateral portal. A band aid was placed. Attention was turned to the left knee. The left knee was sterilized with chloraprep swabs. Then a mixture of 4 cc of 1% lidocaine and 1 cc of 40 mg/ml of kenalog was injected into the knee through the anterolateral portal. A band aid was placed. The patient tolerated the procedure well. They were instructed on signs of infection and to call should any of these symptoms occur.    Danyelle Henry DO  Orthopedic Oncology and General Orthopedics   [facility]

## 2025-07-10 DIAGNOSIS — J45.30 MILD PERSISTENT ASTHMA, UNCOMPLICATED: ICD-10-CM

## 2025-07-11 RX ORDER — ALBUTEROL SULFATE 90 UG/1
INHALANT RESPIRATORY (INHALATION)
Qty: 8.5 EACH | Refills: 0 | Status: SHIPPED | OUTPATIENT
Start: 2025-07-11

## 2025-07-11 NOTE — TELEPHONE ENCOUNTER
Received request via: Pharmacy    Was the patient seen in the last year in this department? Yes    Does the patient have an active prescription (recently filled or refills available) for medication(s) requested? No    Pharmacy Name: [id number]    Does the patient have USP Plus and need 100-day supply? (This applies to ALL medications) Patient does not have SCP

## 2025-07-17 ENCOUNTER — TELEPHONE (OUTPATIENT)
Dept: FAMILY PLANNING/WOMEN'S HEALTH CLINIC | Facility: PHYSICIAN GROUP | Age: 65
End: 2025-07-17
Payer: MEDICARE

## 2025-07-17 NOTE — TELEPHONE ENCOUNTER
Phone Number Called: [phone] (home)       Call outcome: Left detailed message for patient. Informed to call back with any additional questions.    Message: I LVM stating I was calling from [facility] to make a [facility]ILMER appt and if interested to please give me a call back at [phone].

## 2025-07-22 DIAGNOSIS — I10 ESSENTIAL HYPERTENSION: ICD-10-CM

## 2025-07-28 RX ORDER — VALSARTAN 160 MG/1
160 TABLET ORAL
Qty: 90 TABLET | Refills: 3 | Status: SHIPPED | OUTPATIENT
Start: 2025-07-28

## 2025-08-02 DIAGNOSIS — J45.30 MILD PERSISTENT ASTHMA, UNCOMPLICATED: ICD-10-CM

## 2025-08-04 RX ORDER — ALBUTEROL SULFATE 90 UG/1
INHALANT RESPIRATORY (INHALATION)
Qty: 8.5 EACH | Refills: 0 | Status: SHIPPED | OUTPATIENT
Start: 2025-08-04